# Patient Record
Sex: FEMALE | Race: WHITE | NOT HISPANIC OR LATINO
[De-identification: names, ages, dates, MRNs, and addresses within clinical notes are randomized per-mention and may not be internally consistent; named-entity substitution may affect disease eponyms.]

---

## 2017-01-25 ENCOUNTER — APPOINTMENT (OUTPATIENT)
Dept: HUMAN REPRODUCTION | Facility: CLINIC | Age: 32
End: 2017-01-25

## 2017-01-26 ENCOUNTER — APPOINTMENT (OUTPATIENT)
Dept: HUMAN REPRODUCTION | Facility: CLINIC | Age: 32
End: 2017-01-26

## 2017-02-02 ENCOUNTER — APPOINTMENT (OUTPATIENT)
Dept: HUMAN REPRODUCTION | Facility: CLINIC | Age: 32
End: 2017-02-02

## 2017-02-06 ENCOUNTER — APPOINTMENT (OUTPATIENT)
Dept: HUMAN REPRODUCTION | Facility: CLINIC | Age: 32
End: 2017-02-06

## 2017-02-07 ENCOUNTER — OTHER (OUTPATIENT)
Age: 32
End: 2017-02-07

## 2017-02-13 ENCOUNTER — APPOINTMENT (OUTPATIENT)
Dept: HUMAN REPRODUCTION | Facility: CLINIC | Age: 32
End: 2017-02-13

## 2017-02-15 ENCOUNTER — APPOINTMENT (OUTPATIENT)
Dept: HUMAN REPRODUCTION | Facility: CLINIC | Age: 32
End: 2017-02-15

## 2017-02-17 ENCOUNTER — OTHER (OUTPATIENT)
Age: 32
End: 2017-02-17

## 2017-02-17 ENCOUNTER — APPOINTMENT (OUTPATIENT)
Dept: HUMAN REPRODUCTION | Facility: CLINIC | Age: 32
End: 2017-02-17

## 2017-02-18 ENCOUNTER — APPOINTMENT (OUTPATIENT)
Dept: HUMAN REPRODUCTION | Facility: CLINIC | Age: 32
End: 2017-02-18

## 2017-02-19 ENCOUNTER — APPOINTMENT (OUTPATIENT)
Dept: HUMAN REPRODUCTION | Facility: CLINIC | Age: 32
End: 2017-02-19

## 2017-02-20 ENCOUNTER — APPOINTMENT (OUTPATIENT)
Dept: HUMAN REPRODUCTION | Facility: CLINIC | Age: 32
End: 2017-02-20

## 2017-02-23 ENCOUNTER — APPOINTMENT (OUTPATIENT)
Dept: HUMAN REPRODUCTION | Facility: CLINIC | Age: 32
End: 2017-02-23

## 2017-02-25 ENCOUNTER — APPOINTMENT (OUTPATIENT)
Dept: HUMAN REPRODUCTION | Facility: CLINIC | Age: 32
End: 2017-02-25

## 2017-03-03 ENCOUNTER — APPOINTMENT (OUTPATIENT)
Dept: HUMAN REPRODUCTION | Facility: CLINIC | Age: 32
End: 2017-03-03

## 2017-03-06 ENCOUNTER — APPOINTMENT (OUTPATIENT)
Dept: HUMAN REPRODUCTION | Facility: CLINIC | Age: 32
End: 2017-03-06

## 2017-03-08 ENCOUNTER — APPOINTMENT (OUTPATIENT)
Dept: HUMAN REPRODUCTION | Facility: CLINIC | Age: 32
End: 2017-03-08

## 2017-03-08 PROBLEM — N97.9 FEMALE INFERTILITY: Status: ACTIVE | Noted: 2017-02-02

## 2017-03-10 ENCOUNTER — APPOINTMENT (OUTPATIENT)
Dept: HUMAN REPRODUCTION | Facility: CLINIC | Age: 32
End: 2017-03-10

## 2017-03-10 DIAGNOSIS — N97.9 FEMALE INFERTILITY, UNSPECIFIED: ICD-10-CM

## 2017-03-20 ENCOUNTER — APPOINTMENT (OUTPATIENT)
Dept: HUMAN REPRODUCTION | Facility: CLINIC | Age: 32
End: 2017-03-20

## 2017-03-27 ENCOUNTER — APPOINTMENT (OUTPATIENT)
Dept: HUMAN REPRODUCTION | Facility: CLINIC | Age: 32
End: 2017-03-27

## 2017-04-01 ENCOUNTER — APPOINTMENT (OUTPATIENT)
Dept: HUMAN REPRODUCTION | Facility: CLINIC | Age: 32
End: 2017-04-01

## 2017-06-22 ENCOUNTER — RX RENEWAL (OUTPATIENT)
Age: 32
End: 2017-06-22

## 2017-06-28 ENCOUNTER — EMERGENCY (EMERGENCY)
Facility: HOSPITAL | Age: 32
LOS: 1 days | Discharge: HOME | End: 2017-06-28
Admitting: EMERGENCY MEDICINE
Payer: COMMERCIAL

## 2017-06-28 ENCOUNTER — OUTPATIENT (OUTPATIENT)
Dept: OUTPATIENT SERVICES | Facility: HOSPITAL | Age: 32
LOS: 1 days | End: 2017-06-28
Payer: COMMERCIAL

## 2017-06-28 VITALS
OXYGEN SATURATION: 100 % | DIASTOLIC BLOOD PRESSURE: 84 MMHG | HEART RATE: 90 BPM | RESPIRATION RATE: 18 BRPM | SYSTOLIC BLOOD PRESSURE: 136 MMHG

## 2017-06-28 VITALS
RESPIRATION RATE: 17 BRPM | SYSTOLIC BLOOD PRESSURE: 101 MMHG | TEMPERATURE: 98 F | HEART RATE: 89 BPM | DIASTOLIC BLOOD PRESSURE: 73 MMHG | OXYGEN SATURATION: 99 %

## 2017-06-28 DIAGNOSIS — O26.899 OTHER SPECIFIED PREGNANCY RELATED CONDITIONS, UNSPECIFIED TRIMESTER: ICD-10-CM

## 2017-06-28 DIAGNOSIS — Z98.89 OTHER SPECIFIED POSTPROCEDURAL STATES: Chronic | ICD-10-CM

## 2017-06-28 DIAGNOSIS — Z3A.00 WEEKS OF GESTATION OF PREGNANCY NOT SPECIFIED: ICD-10-CM

## 2017-06-28 LAB
ANION GAP SERPL CALC-SCNC: 13 MMOL/L — SIGNIFICANT CHANGE UP (ref 5–17)
APPEARANCE UR: ABNORMAL
BILIRUB UR-MCNC: NEGATIVE — SIGNIFICANT CHANGE UP
BLD GP AB SCN SERPL QL: NEGATIVE — SIGNIFICANT CHANGE UP
BUN SERPL-MCNC: 7 MG/DL — SIGNIFICANT CHANGE UP (ref 7–23)
CALCIUM SERPL-MCNC: 9.3 MG/DL — SIGNIFICANT CHANGE UP (ref 8.4–10.5)
CHLORIDE SERPL-SCNC: 104 MMOL/L — SIGNIFICANT CHANGE UP (ref 96–108)
CO2 SERPL-SCNC: 21 MMOL/L — LOW (ref 22–31)
COLOR SPEC: YELLOW — SIGNIFICANT CHANGE UP
CREAT SERPL-MCNC: 0.66 MG/DL — SIGNIFICANT CHANGE UP (ref 0.5–1.3)
DIFF PNL FLD: ABNORMAL
EPI CELLS # UR: SIGNIFICANT CHANGE UP /HPF
GLUCOSE SERPL-MCNC: 111 MG/DL — HIGH (ref 70–99)
GLUCOSE UR QL: NEGATIVE — SIGNIFICANT CHANGE UP
HCT VFR BLD CALC: 35.7 % — SIGNIFICANT CHANGE UP (ref 34.5–45)
HGB BLD-MCNC: 12.1 G/DL — SIGNIFICANT CHANGE UP (ref 11.5–15.5)
KETONES UR-MCNC: NEGATIVE — SIGNIFICANT CHANGE UP
LEUKOCYTE ESTERASE UR-ACNC: ABNORMAL
MCHC RBC-ENTMCNC: 30.4 PG — SIGNIFICANT CHANGE UP (ref 27–34)
MCHC RBC-ENTMCNC: 33.8 GM/DL — SIGNIFICANT CHANGE UP (ref 32–36)
MCV RBC AUTO: 89.9 FL — SIGNIFICANT CHANGE UP (ref 80–100)
NITRITE UR-MCNC: NEGATIVE — SIGNIFICANT CHANGE UP
PH UR: 6 — SIGNIFICANT CHANGE UP (ref 5–8)
PLATELET # BLD AUTO: 194 K/UL — SIGNIFICANT CHANGE UP (ref 150–400)
POTASSIUM SERPL-MCNC: 4.7 MMOL/L — SIGNIFICANT CHANGE UP (ref 3.5–5.3)
POTASSIUM SERPL-SCNC: 4.7 MMOL/L — SIGNIFICANT CHANGE UP (ref 3.5–5.3)
PROT UR-MCNC: SIGNIFICANT CHANGE UP
RBC # BLD: 3.97 M/UL — SIGNIFICANT CHANGE UP (ref 3.8–5.2)
RBC # FLD: 13 % — SIGNIFICANT CHANGE UP (ref 10.3–14.5)
RBC CASTS # UR COMP ASSIST: SIGNIFICANT CHANGE UP /HPF (ref 0–2)
RH IG SCN BLD-IMP: POSITIVE — SIGNIFICANT CHANGE UP
SODIUM SERPL-SCNC: 138 MMOL/L — SIGNIFICANT CHANGE UP (ref 135–145)
SP GR SPEC: 1.01 — SIGNIFICANT CHANGE UP (ref 1.01–1.02)
UROBILINOGEN FLD QL: NEGATIVE — SIGNIFICANT CHANGE UP
WBC # BLD: 9.1 K/UL — SIGNIFICANT CHANGE UP (ref 3.8–10.5)
WBC # FLD AUTO: 9.1 K/UL — SIGNIFICANT CHANGE UP (ref 3.8–10.5)
WBC UR QL: SIGNIFICANT CHANGE UP /HPF (ref 0–5)

## 2017-06-28 PROCEDURE — 59025 FETAL NON-STRESS TEST: CPT

## 2017-06-28 PROCEDURE — G0463: CPT

## 2017-06-28 PROCEDURE — 80048 BASIC METABOLIC PNL TOTAL CA: CPT

## 2017-06-28 PROCEDURE — 81001 URINALYSIS AUTO W/SCOPE: CPT

## 2017-06-28 PROCEDURE — 86900 BLOOD TYPING SEROLOGIC ABO: CPT

## 2017-06-28 PROCEDURE — 86850 RBC ANTIBODY SCREEN: CPT

## 2017-06-28 PROCEDURE — 86901 BLOOD TYPING SEROLOGIC RH(D): CPT

## 2017-06-28 PROCEDURE — 99053 MED SERV 10PM-8AM 24 HR FAC: CPT

## 2017-06-28 PROCEDURE — 85027 COMPLETE CBC AUTOMATED: CPT

## 2017-06-28 PROCEDURE — 99283 EMERGENCY DEPT VISIT LOW MDM: CPT

## 2017-06-28 PROCEDURE — 99284 EMERGENCY DEPT VISIT MOD MDM: CPT | Mod: 25

## 2017-06-28 RX ORDER — METHENAMINE MANDELATE 1 G
1 TABLET ORAL
Qty: 14 | Refills: 0
Start: 2017-06-28 | End: 2017-07-05

## 2017-06-28 NOTE — ED PROVIDER NOTE - PROGRESS NOTE DETAILS
Case d/w L&D, who agree with d/c to L&D, OB/GYN will f/u Rh status. Bedside FHR:  160.  Case d/w L&D, who agree with d/c to L&D, OB/GYN will f/u Rh status. RH+.  UA = large LE, trace blood, moderate epithelial cells.  Denied dysuria/hematuria.  Results communicated to L&D.
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
## 2017-07-10 ENCOUNTER — RX RENEWAL (OUTPATIENT)
Age: 32
End: 2017-07-10

## 2018-03-09 ENCOUNTER — APPOINTMENT (OUTPATIENT)
Dept: PAIN MANAGEMENT | Facility: CLINIC | Age: 33
End: 2018-03-09
Payer: COMMERCIAL

## 2018-03-09 VITALS
HEART RATE: 74 BPM | DIASTOLIC BLOOD PRESSURE: 72 MMHG | WEIGHT: 185 LBS | HEIGHT: 64 IN | SYSTOLIC BLOOD PRESSURE: 110 MMHG | BODY MASS INDEX: 31.58 KG/M2

## 2018-03-09 PROCEDURE — 99213 OFFICE O/P EST LOW 20 MIN: CPT

## 2018-03-09 RX ORDER — ASCORBIC ACID, CHOLECALCIFEROL, .ALPHA.-TOCOPHEROL ACETATE, DL-, PYRIDOXINE, FOLIC ACID, CYANOCOBALAMIN, CALCIUM, FERROUS FUMARATE, MAGNESIUM, DOCONEXENT 85; 200; 10; 25; 1; 12; 140; 27; 45; 300 [IU]/1; [IU]/1; [IU]/1; [IU]/1; MG/1; UG/1; MG/1; MG/1; MG/1; MG/1
27-0.6-0.4-3 CAPSULE, GELATIN COATED ORAL DAILY
Qty: 90 | Refills: 0 | Status: DISCONTINUED | COMMUNITY
Start: 2017-04-01 | End: 2018-03-09

## 2018-03-09 RX ORDER — NITROFURANTOIN (MONOHYDRATE/MACROCRYSTALS) 25; 75 MG/1; MG/1
100 CAPSULE ORAL TWICE DAILY
Qty: 20 | Refills: 0 | Status: DISCONTINUED | COMMUNITY
Start: 2017-04-01 | End: 2018-03-09

## 2018-03-09 RX ORDER — VITAMIN A ACETATE, .BETA.-CAROTENE, ASCORBIC ACID, CHOLECALCIFEROL, .ALPHA.-TOCOPHEROL ACETATE, DL-, THIAMINE MONONITRATE, RIBOFLAVIN, NIACINAMIDE, PYRIDOXINE HYDROCHLORIDE, FOLIC ACID, CYANOCOBALAMIN, CALCIUM CARBONATE, FERROUS FUMARATE, ZINC OXIDE, AND CUPRIC OXIDE 2000; 2000; 120; 400; 22; 1.84; 3; 20; 10; 1; 12; 200; 27; 25; 2 [IU]/1; [IU]/1; MG/1; [IU]/1; MG/1; MG/1; MG/1; MG/1; MG/1; MG/1; UG/1; MG/1; MG/1; MG/1; MG/1
27-1 TABLET ORAL DAILY
Qty: 30 | Refills: 11 | Status: DISCONTINUED | COMMUNITY
Start: 2017-02-02 | End: 2018-03-09

## 2018-03-09 RX ORDER — GONADOTROPHIN, CHORIONIC 10000 UNIT
10000 KIT INTRAMUSCULAR
Qty: 1 | Refills: 3 | Status: DISCONTINUED | COMMUNITY
Start: 2017-02-07 | End: 2018-03-09

## 2018-03-09 RX ORDER — FOLLITROPIN ALFA 450 UNIT
450 KIT SUBCUTANEOUS
Qty: 10 | Refills: 3 | Status: DISCONTINUED | COMMUNITY
Start: 2017-02-07 | End: 2018-03-09

## 2018-03-09 RX ORDER — NORETHINDRONE ACETATE AND ETHINYL ESTRADIOL AND FERROUS FUMARATE 1MG-20(21)
1-20 KIT ORAL
Qty: 28 | Refills: 0 | Status: ACTIVE | COMMUNITY
Start: 2017-11-30

## 2018-03-09 RX ORDER — LEVOTHYROXINE SODIUM 0.2 MG/1
200 TABLET ORAL
Qty: 30 | Refills: 0 | Status: ACTIVE | COMMUNITY
Start: 2017-08-16

## 2018-03-09 RX ORDER — SERTRALINE HYDROCHLORIDE 50 MG/1
50 TABLET, FILM COATED ORAL
Qty: 90 | Refills: 0 | Status: ACTIVE | COMMUNITY
Start: 2018-02-26

## 2018-03-09 RX ORDER — SYRINGE WITH NEEDLE, 1 ML 25GX5/8"
22G X 1-1/2" SYRINGE, EMPTY DISPOSABLE MISCELLANEOUS
Qty: 30 | Refills: 3 | Status: DISCONTINUED | COMMUNITY
Start: 2017-02-07 | End: 2018-03-09

## 2018-03-09 RX ORDER — LEUPROLIDE ACETATE 1 MG/0.2ML
1 KIT SUBCUTANEOUS
Qty: 1 | Refills: 0 | Status: DISCONTINUED | COMMUNITY
Start: 2017-02-17 | End: 2018-03-09

## 2018-03-09 RX ORDER — DOXYCYCLINE HYCLATE 100 MG/1
100 CAPSULE ORAL
Qty: 33 | Refills: 1 | Status: DISCONTINUED | COMMUNITY
Start: 2017-02-07 | End: 2018-03-09

## 2018-03-09 RX ORDER — GANIRELIX ACETATE 250 UG/.5ML
250 INJECTION, SOLUTION SUBCUTANEOUS
Qty: 10 | Refills: 3 | Status: DISCONTINUED | COMMUNITY
Start: 2017-02-07 | End: 2018-03-09

## 2018-03-09 RX ORDER — PROGESTERONE 50 MG/ML
50 INJECTION, SOLUTION INTRAMUSCULAR
Qty: 6 | Refills: 3 | Status: DISCONTINUED | COMMUNITY
Start: 2017-02-07 | End: 2018-03-09

## 2018-03-09 RX ORDER — METHYLPREDNISOLONE 8 MG/1
8 TABLET ORAL
Qty: 12 | Refills: 2 | Status: DISCONTINUED | COMMUNITY
Start: 2017-02-07 | End: 2018-03-09

## 2018-03-09 RX ORDER — NORETHINDRONE AND ETHINYL ESTRADIOL 1 MG-35MCG
1-35 KIT ORAL DAILY
Qty: 1 | Refills: 1 | Status: DISCONTINUED | COMMUNITY
Start: 2017-01-25 | End: 2018-03-09

## 2018-03-09 RX ORDER — CHLORHEXIDINE GLUCONATE 4 %
325 (65 FE) LIQUID (ML) TOPICAL
Qty: 60 | Refills: 0 | Status: ACTIVE | COMMUNITY
Start: 2017-10-29

## 2018-03-09 RX ORDER — NEEDLES, DISPOSABLE 25GX5/8"
27G X 1/2" NEEDLE, DISPOSABLE MISCELLANEOUS
Qty: 30 | Refills: 3 | Status: DISCONTINUED | COMMUNITY
Start: 2017-02-07 | End: 2018-03-09

## 2018-03-09 RX ORDER — NEEDLES, DISPOSABLE 25GX5/8"
18G X 1-1/2" NEEDLE, DISPOSABLE MISCELLANEOUS
Qty: 30 | Refills: 4 | Status: DISCONTINUED | COMMUNITY
Start: 2017-02-07 | End: 2018-03-09

## 2018-03-09 RX ORDER — MENOTROPINS 75 UNIT
75 KIT SUBCUTANEOUS
Qty: 60 | Refills: 3 | Status: DISCONTINUED | COMMUNITY
Start: 2017-02-07 | End: 2018-03-09

## 2018-03-29 ENCOUNTER — APPOINTMENT (OUTPATIENT)
Dept: PAIN MANAGEMENT | Facility: CLINIC | Age: 33
End: 2018-03-29
Payer: COMMERCIAL

## 2018-03-29 VITALS
HEART RATE: 76 BPM | BODY MASS INDEX: 30.73 KG/M2 | DIASTOLIC BLOOD PRESSURE: 73 MMHG | WEIGHT: 180 LBS | SYSTOLIC BLOOD PRESSURE: 108 MMHG | HEIGHT: 64 IN

## 2018-03-29 PROCEDURE — 99213 OFFICE O/P EST LOW 20 MIN: CPT | Mod: 25

## 2018-03-29 PROCEDURE — 64615 CHEMODENERV MUSC MIGRAINE: CPT

## 2018-06-27 ENCOUNTER — APPOINTMENT (OUTPATIENT)
Dept: PAIN MANAGEMENT | Facility: CLINIC | Age: 33
End: 2018-06-27
Payer: COMMERCIAL

## 2018-06-27 VITALS
BODY MASS INDEX: 29.02 KG/M2 | DIASTOLIC BLOOD PRESSURE: 71 MMHG | HEIGHT: 64 IN | WEIGHT: 170 LBS | HEART RATE: 79 BPM | SYSTOLIC BLOOD PRESSURE: 108 MMHG

## 2018-06-27 PROCEDURE — 64615 CHEMODENERV MUSC MIGRAINE: CPT

## 2018-06-27 PROCEDURE — 99213 OFFICE O/P EST LOW 20 MIN: CPT | Mod: 25

## 2018-06-27 RX ORDER — PHENTERMINE AND TOPIRAMATE 11.25; 69 MG/1; MG/1
11.25-69 CAPSULE, EXTENDED RELEASE ORAL
Refills: 0 | Status: ACTIVE | COMMUNITY

## 2018-06-27 RX ORDER — FLUOXETINE HYDROCHLORIDE 10 MG/1
10 CAPSULE ORAL
Refills: 0 | Status: ACTIVE | COMMUNITY

## 2018-10-03 ENCOUNTER — APPOINTMENT (OUTPATIENT)
Dept: PAIN MANAGEMENT | Facility: CLINIC | Age: 33
End: 2018-10-03
Payer: COMMERCIAL

## 2018-10-09 ENCOUNTER — APPOINTMENT (OUTPATIENT)
Dept: PAIN MANAGEMENT | Facility: CLINIC | Age: 33
End: 2018-10-09
Payer: COMMERCIAL

## 2018-10-09 VITALS
DIASTOLIC BLOOD PRESSURE: 75 MMHG | WEIGHT: 170 LBS | SYSTOLIC BLOOD PRESSURE: 106 MMHG | HEART RATE: 79 BPM | HEIGHT: 64 IN | BODY MASS INDEX: 29.02 KG/M2

## 2018-10-09 PROCEDURE — 99213 OFFICE O/P EST LOW 20 MIN: CPT | Mod: 25

## 2018-10-09 PROCEDURE — 64615 CHEMODENERV MUSC MIGRAINE: CPT

## 2018-10-10 PROBLEM — E03.9 HYPOTHYROIDISM, UNSPECIFIED: Chronic | Status: ACTIVE | Noted: 2017-06-28

## 2019-01-19 ENCOUNTER — TRANSCRIPTION ENCOUNTER (OUTPATIENT)
Age: 34
End: 2019-01-19

## 2019-01-22 ENCOUNTER — APPOINTMENT (OUTPATIENT)
Dept: PAIN MANAGEMENT | Facility: CLINIC | Age: 34
End: 2019-01-22
Payer: COMMERCIAL

## 2019-01-22 VITALS
HEART RATE: 84 BPM | WEIGHT: 170 LBS | HEIGHT: 64 IN | BODY MASS INDEX: 29.02 KG/M2 | DIASTOLIC BLOOD PRESSURE: 76 MMHG | SYSTOLIC BLOOD PRESSURE: 115 MMHG

## 2019-01-22 PROCEDURE — 64615 CHEMODENERV MUSC MIGRAINE: CPT

## 2019-01-22 PROCEDURE — 99213 OFFICE O/P EST LOW 20 MIN: CPT | Mod: 25

## 2019-01-22 NOTE — PHYSICAL EXAM
[General Appearance - Alert] : alert [General Appearance - Well Nourished] : well nourished [General Appearance - Well Developed] : well developed [Oriented To Time, Place, And Person] : oriented to person, place, and time [Affect] : the affect was normal [Mood] : the mood was normal [Person] : oriented to person [Place] : oriented to place [Time] : oriented to time [Short Term Intact] : short term memory intact [Remote Intact] : remote memory intact [Registration Intact] : recent registration memory intact [Span Intact] : the attention span was normal [Concentration Intact] : normal concentrating ability [Visual Intact] : visual attention was ~T not ~L decreased [Fluency] : fluency intact [Comprehension] : comprehension intact [Cranial Nerves Oculomotor (III)] : extraocular motion intact [Cranial Nerves Trigeminal (V)] : facial sensation intact symmetrically [Cranial Nerves Facial (VII)] : face symmetrical [Cranial Nerves Vestibulocochlear (VIII)] : hearing was intact bilaterally [Cranial Nerves Accessory (XI - Cranial And Spinal)] : head turning and shoulder shrug symmetric [Motor Tone] : muscle tone was normal in all four extremities [Motor Strength] : muscle strength was normal in all four extremities [Involuntary Movements] : no involuntary movements were seen [Balance] : balance was intact [Sclera] : the sclera and conjunctiva were normal [Extraocular Movements] : extraocular movements were intact [Outer Ear] : the ears and nose were normal in appearance [Hearing Threshold Finger Rub Not [place]] : hearing was normal [Neck Appearance] : the appearance of the neck was normal [Abnormal Walk] : normal gait [Musculoskeletal - Swelling] : no joint swelling seen [Skin Color & Pigmentation] : normal skin color and pigmentation [] : no rash

## 2019-01-25 ENCOUNTER — MEDICATION RENEWAL (OUTPATIENT)
Age: 34
End: 2019-01-25

## 2019-02-17 NOTE — ASSESSMENT
[FreeTextEntry1] : 34 y/o female patient with chronic migraines presents today for a follow up. Following her examination today it has been concluded that the BOTOX has provided a significant reduction in migraines a month. No AE to the BOTOX have been reported at this time. \par \par As a result: \par 1) She will undergo an additional BOTOX treatment today. The procedure was tolerated well and she reports no immediate AE. \par 2) It is advised that certain components of the weight loss drug could cause some of the minor HAs and that the BOTOX does not always provide relief from the minor HAs. \par 3) She was educated on the risks and benefits of preventative medications such as Emgality and Aimovig. Approval is needed before she can begin the medication. \par 4) Upon approval she will follow up to be educated on the usage of the medication. She will follow up for additional BOTOX treatment in 3 months or sooner if clinically indicated.

## 2019-02-17 NOTE — HISTORY OF PRESENT ILLNESS
[FreeTextEntry1] : 34 y/o female patient presents today for a follow up. She reports that since the last BOTOX treatment only one migraine a week occurs. Before the BOTOX there would be 3 migraines a week. She notes that mild HAs do occur 3-4 days a week. Additionally she has been taking a weight loss drug.

## 2019-02-17 NOTE — END OF VISIT
[>50% of Time Spent on Counseling for ____] : Greater than 50% of the encounter time was spent on counseling for [unfilled] [Time Spent: ___ minutes] : I have spent [unfilled] minutes of face to face time with the patient [FreeTextEntry3] : This note was authored by Dominic Arroyo working as a medical scribe for Dr. Ryan Enriquez. The note was reviewed, edited, and revised by Dr. Ryan Enriquez who is in agreement with the exam findings, and treatment plan. (1/22/19)

## 2019-02-17 NOTE — PROCEDURE
[Chronic migraine] : Chronic migraine [Consent Signed] : consent signed [Continue Current Treatment] : continue current treatment [BOTOX 200 Units] : BOTOX 200 units; 5 units per muscle site.  procerus x 1, corragator x 2, frontalis x 4, temporalis x 8, occipitalis x 6, cervical  paraspinal x 4 and trapezius x 6 [Adverse Effects] : no adverse effects [FreeTextEntry1] : 45 units wasted.

## 2019-04-23 ENCOUNTER — APPOINTMENT (OUTPATIENT)
Dept: PAIN MANAGEMENT | Facility: CLINIC | Age: 34
End: 2019-04-23
Payer: COMMERCIAL

## 2019-04-23 VITALS
WEIGHT: 160 LBS | BODY MASS INDEX: 27.31 KG/M2 | HEART RATE: 75 BPM | SYSTOLIC BLOOD PRESSURE: 108 MMHG | DIASTOLIC BLOOD PRESSURE: 72 MMHG | HEIGHT: 64 IN

## 2019-04-23 PROCEDURE — 99213 OFFICE O/P EST LOW 20 MIN: CPT | Mod: 25

## 2019-04-23 PROCEDURE — 64615 CHEMODENERV MUSC MIGRAINE: CPT

## 2019-04-23 NOTE — END OF VISIT
[>50% of Time Spent on Counseling for ____] : Greater than 50% of the encounter time was spent on counseling for [unfilled] [Time Spent: ___ minutes] : I have spent [unfilled] minutes of face to face time with the patient [FreeTextEntry3] : This note was authored by Dominic Arroyo working as a medical scribe for Dr. Ryan Enriquez. The note was reviewed, edited, and revised by Dr. Ryan Enriquez who is in agreement with the exam findings, and treatment plan. (4/23/19)

## 2019-04-23 NOTE — PHYSICAL EXAM
[General Appearance - Alert] : alert [General Appearance - Well Nourished] : well nourished [General Appearance - Well Developed] : well developed [Oriented To Time, Place, And Person] : oriented to person, place, and time [Mood] : the mood was normal [Affect] : the affect was normal [Person] : oriented to person [Time] : oriented to time [Place] : oriented to place [Short Term Intact] : short term memory intact [Remote Intact] : remote memory intact [Registration Intact] : recent registration memory intact [Span Intact] : the attention span was normal [Concentration Intact] : normal concentrating ability [Visual Intact] : visual attention was ~T not ~L decreased [Fluency] : fluency intact [Comprehension] : comprehension intact [Cranial Nerves Oculomotor (III)] : extraocular motion intact [Cranial Nerves Facial (VII)] : face symmetrical [Cranial Nerves Trigeminal (V)] : facial sensation intact symmetrically [Cranial Nerves Accessory (XI - Cranial And Spinal)] : head turning and shoulder shrug symmetric [Cranial Nerves Vestibulocochlear (VIII)] : hearing was intact bilaterally [Involuntary Movements] : no involuntary movements were seen [Motor Strength] : muscle strength was normal in all four extremities [Motor Tone] : muscle tone was normal in all four extremities [Balance] : balance was intact [Sclera] : the sclera and conjunctiva were normal [Extraocular Movements] : extraocular movements were intact [Outer Ear] : the ears and nose were normal in appearance [Neck Appearance] : the appearance of the neck was normal [Hearing Threshold Finger Rub Not [place]] : hearing was normal [Abnormal Walk] : normal gait [Musculoskeletal - Swelling] : no joint swelling seen [Skin Color & Pigmentation] : normal skin color and pigmentation [] : no rash

## 2019-04-23 NOTE — DATA REVIEWED
[FreeTextEntry1] : Pt's I-STOP was reviewed and discussed during today's visit. Ref # [id number]\par

## 2019-04-23 NOTE — ASSESSMENT
[FreeTextEntry1] : 32 y/o female patient with chronic migraines presents today for a follow up. Following her examination today it is determined that BOTOX continues to provide a significant reduction in the frequency of her HAs. She will be provided with a card for Emgality that will allow her to receive the medication for free for a year. \par \par During her visit today: \par 1) An additional BOTOX treatment was completed. The procedure was tolerated well and no immediate AE were reported. \par 2) Emgality is to be initiated. \par 3) She will follow up in 3 months or sooner if clinically indicated.

## 2019-04-23 NOTE — PROCEDURE
[Consent Signed] : consent signed [Chronic migraine] : Chronic migraine [Continue Current Treatment] : continue current treatment [BOTOX 200 Units] : BOTOX 200 units; 5 units per muscle site.  procerus x 1, corragator x 2, frontalis x 4, temporalis x 8, occipitalis x 6, cervical  paraspinal x 4 and trapezius x 6 [Adverse Effects] : no adverse effects [FreeTextEntry1] : 45 units wasted.

## 2019-04-23 NOTE — HISTORY OF PRESENT ILLNESS
[FreeTextEntry1] : 34 y/o female patient presents today for a follow up. Today she continues to report that the BOTOX provides a significant reduction in the frequency of her migraines. Before starting BOTOX she would experience 2-3 migraines a week. Currently, only 1-2 migraines occur a month. No AE to the BOTOX are reported at this time. Emgality was to be initiated but it was not approved by her pharmacy.

## 2019-04-26 ENCOUNTER — APPOINTMENT (OUTPATIENT)
Dept: PAIN MANAGEMENT | Facility: CLINIC | Age: 34
End: 2019-04-26
Payer: COMMERCIAL

## 2019-04-26 VITALS
SYSTOLIC BLOOD PRESSURE: 109 MMHG | HEIGHT: 64 IN | HEART RATE: 85 BPM | BODY MASS INDEX: 27.31 KG/M2 | DIASTOLIC BLOOD PRESSURE: 57 MMHG | WEIGHT: 160 LBS

## 2019-04-26 DIAGNOSIS — G43.909 MIGRAINE, UNSPECIFIED, NOT INTRACTABLE, W/OUT STATUS MIGRAINOSUS: ICD-10-CM

## 2019-04-26 DIAGNOSIS — R51 HEADACHE: ICD-10-CM

## 2019-04-26 PROCEDURE — 96372 THER/PROPH/DIAG INJ SC/IM: CPT

## 2019-04-26 PROCEDURE — 99212 OFFICE O/P EST SF 10 MIN: CPT | Mod: 25

## 2019-04-26 RX ORDER — GALCANEZUMAB 120 MG/ML
120 INJECTION, SOLUTION SUBCUTANEOUS
Qty: 1 | Refills: 5 | Status: ACTIVE | COMMUNITY
Start: 2019-01-22 | End: 1900-01-01

## 2019-04-26 NOTE — PHYSICAL EXAM
[General Appearance - Alert] : alert [General Appearance - Well Nourished] : well nourished [Oriented To Time, Place, And Person] : oriented to person, place, and time [Affect] : the affect was normal [General Appearance - Well Developed] : well developed [Mood] : the mood was normal [Person] : oriented to person [Short Term Intact] : short term memory intact [Time] : oriented to time [Place] : oriented to place [Registration Intact] : recent registration memory intact [Remote Intact] : remote memory intact [Span Intact] : the attention span was normal [Visual Intact] : visual attention was ~T not ~L decreased [Concentration Intact] : normal concentrating ability [Cranial Nerves Oculomotor (III)] : extraocular motion intact [Comprehension] : comprehension intact [Fluency] : fluency intact [Cranial Nerves Trigeminal (V)] : facial sensation intact symmetrically [Cranial Nerves Facial (VII)] : face symmetrical [Motor Tone] : muscle tone was normal in all four extremities [Cranial Nerves Accessory (XI - Cranial And Spinal)] : head turning and shoulder shrug symmetric [Cranial Nerves Vestibulocochlear (VIII)] : hearing was intact bilaterally [Motor Strength] : muscle strength was normal in all four extremities [Involuntary Movements] : no involuntary movements were seen [Balance] : balance was intact [Sclera] : the sclera and conjunctiva were normal [Hearing Threshold Finger Rub Not [place]] : hearing was normal [Outer Ear] : the ears and nose were normal in appearance [Extraocular Movements] : extraocular movements were intact [Neck Appearance] : the appearance of the neck was normal [Musculoskeletal - Swelling] : no joint swelling seen [Abnormal Walk] : normal gait [] : no rash [Skin Color & Pigmentation] : normal skin color and pigmentation

## 2019-04-29 NOTE — DATA REVIEWED
[FreeTextEntry1] : Pt's I-STOP was reviewed and discussed during today's visit. Ref # [id number]\par

## 2019-04-29 NOTE — ASSESSMENT
[FreeTextEntry1] : Emgality injections 120 mgs x's 2 administered.  See above procedure note.  Istop (#[id number] \par \par Patient education provided regarding proper self administration, storage, disposal, and side effects.\par \par She will RTO in 3 months time for re-evaluation or sooner if clinically indicated.

## 2019-04-29 NOTE — HISTORY OF PRESENT ILLNESS
[FreeTextEntry1] : The patient returned today for a follow up ov.  She is here today to have her first emgality injections and patient teaching for self administration at home.  Her headaches have remained stable in both intensity and frequency.  They continue to significantly impact her daily life.

## 2019-07-12 ENCOUNTER — TRANSCRIPTION ENCOUNTER (OUTPATIENT)
Age: 34
End: 2019-07-12

## 2019-08-27 ENCOUNTER — APPOINTMENT (OUTPATIENT)
Dept: PAIN MANAGEMENT | Facility: CLINIC | Age: 34
End: 2019-08-27

## 2019-09-11 ENCOUNTER — TRANSCRIPTION ENCOUNTER (OUTPATIENT)
Age: 34
End: 2019-09-11

## 2019-11-12 ENCOUNTER — TRANSCRIPTION ENCOUNTER (OUTPATIENT)
Age: 34
End: 2019-11-12

## 2020-02-27 ENCOUNTER — APPOINTMENT (OUTPATIENT)
Dept: HUMAN REPRODUCTION | Facility: CLINIC | Age: 35
End: 2020-02-27
Payer: COMMERCIAL

## 2020-02-27 PROCEDURE — 36415 COLL VENOUS BLD VENIPUNCTURE: CPT

## 2020-02-27 PROCEDURE — 99215 OFFICE O/P EST HI 40 MIN: CPT | Mod: 25

## 2020-03-03 ENCOUNTER — APPOINTMENT (OUTPATIENT)
Dept: HUMAN REPRODUCTION | Facility: CLINIC | Age: 35
End: 2020-03-03
Payer: COMMERCIAL

## 2020-03-03 PROCEDURE — 76830 TRANSVAGINAL US NON-OB: CPT

## 2020-03-03 PROCEDURE — 36415 COLL VENOUS BLD VENIPUNCTURE: CPT

## 2020-03-03 PROCEDURE — 99213 OFFICE O/P EST LOW 20 MIN: CPT | Mod: 25

## 2020-03-13 ENCOUNTER — APPOINTMENT (OUTPATIENT)
Dept: HUMAN REPRODUCTION | Facility: CLINIC | Age: 35
End: 2020-03-13
Payer: COMMERCIAL

## 2020-03-13 PROCEDURE — 76830 TRANSVAGINAL US NON-OB: CPT

## 2020-03-13 PROCEDURE — 99213 OFFICE O/P EST LOW 20 MIN: CPT | Mod: 25

## 2020-03-13 PROCEDURE — 36415 COLL VENOUS BLD VENIPUNCTURE: CPT

## 2020-03-17 ENCOUNTER — APPOINTMENT (OUTPATIENT)
Dept: HUMAN REPRODUCTION | Facility: CLINIC | Age: 35
End: 2020-03-17

## 2020-04-09 ENCOUNTER — APPOINTMENT (OUTPATIENT)
Dept: HUMAN REPRODUCTION | Facility: CLINIC | Age: 35
End: 2020-04-09
Payer: COMMERCIAL

## 2020-04-09 ENCOUNTER — APPOINTMENT (OUTPATIENT)
Dept: HUMAN REPRODUCTION | Facility: CLINIC | Age: 35
End: 2020-04-09

## 2020-04-09 PROCEDURE — 99212 OFFICE O/P EST SF 10 MIN: CPT | Mod: 95

## 2020-06-18 ENCOUNTER — APPOINTMENT (OUTPATIENT)
Dept: HUMAN REPRODUCTION | Facility: CLINIC | Age: 35
End: 2020-06-18

## 2020-06-25 ENCOUNTER — APPOINTMENT (OUTPATIENT)
Dept: HUMAN REPRODUCTION | Facility: CLINIC | Age: 35
End: 2020-06-25
Payer: COMMERCIAL

## 2020-06-25 ENCOUNTER — RESULT REVIEW (OUTPATIENT)
Age: 35
End: 2020-06-25

## 2020-06-25 PROCEDURE — 76830 TRANSVAGINAL US NON-OB: CPT

## 2020-06-25 PROCEDURE — 36415 COLL VENOUS BLD VENIPUNCTURE: CPT

## 2020-06-25 PROCEDURE — 99213 OFFICE O/P EST LOW 20 MIN: CPT | Mod: 25

## 2020-06-28 DIAGNOSIS — Z01.818 ENCOUNTER FOR OTHER PREPROCEDURAL EXAMINATION: ICD-10-CM

## 2020-06-29 ENCOUNTER — TRANSCRIPTION ENCOUNTER (OUTPATIENT)
Age: 35
End: 2020-06-29

## 2020-06-29 ENCOUNTER — APPOINTMENT (OUTPATIENT)
Dept: DISASTER EMERGENCY | Facility: CLINIC | Age: 35
End: 2020-06-29

## 2020-06-29 LAB — SARS-COV-2 N GENE NPH QL NAA+PROBE: NOT DETECTED

## 2020-07-04 ENCOUNTER — APPOINTMENT (OUTPATIENT)
Dept: HUMAN REPRODUCTION | Facility: CLINIC | Age: 35
End: 2020-07-04
Payer: COMMERCIAL

## 2020-07-04 PROCEDURE — 36415 COLL VENOUS BLD VENIPUNCTURE: CPT

## 2020-07-04 PROCEDURE — 76830 TRANSVAGINAL US NON-OB: CPT

## 2020-07-04 PROCEDURE — 99213 OFFICE O/P EST LOW 20 MIN: CPT | Mod: 25

## 2020-07-07 ENCOUNTER — APPOINTMENT (OUTPATIENT)
Dept: HUMAN REPRODUCTION | Facility: CLINIC | Age: 35
End: 2020-07-07
Payer: COMMERCIAL

## 2020-07-07 PROCEDURE — 99213 OFFICE O/P EST LOW 20 MIN: CPT | Mod: 25

## 2020-07-07 PROCEDURE — 36415 COLL VENOUS BLD VENIPUNCTURE: CPT

## 2020-07-07 PROCEDURE — 76830 TRANSVAGINAL US NON-OB: CPT | Mod: 26

## 2020-07-10 ENCOUNTER — APPOINTMENT (OUTPATIENT)
Dept: HUMAN REPRODUCTION | Facility: CLINIC | Age: 35
End: 2020-07-10
Payer: COMMERCIAL

## 2020-07-10 PROCEDURE — 36415 COLL VENOUS BLD VENIPUNCTURE: CPT

## 2020-07-10 PROCEDURE — 58340 CATHETER FOR HYSTEROGRAPHY: CPT

## 2020-07-10 PROCEDURE — 99213 OFFICE O/P EST LOW 20 MIN: CPT | Mod: 25

## 2020-07-10 PROCEDURE — 76831 ECHO EXAM UTERUS: CPT

## 2020-07-10 PROCEDURE — 76830 TRANSVAGINAL US NON-OB: CPT | Mod: 26

## 2020-07-13 ENCOUNTER — APPOINTMENT (OUTPATIENT)
Dept: HUMAN REPRODUCTION | Facility: CLINIC | Age: 35
End: 2020-07-13
Payer: COMMERCIAL

## 2020-07-13 PROCEDURE — 76831 ECHO EXAM UTERUS: CPT

## 2020-07-13 PROCEDURE — 58340 CATHETER FOR HYSTEROGRAPHY: CPT

## 2020-07-13 PROCEDURE — 99213 OFFICE O/P EST LOW 20 MIN: CPT | Mod: 25

## 2020-07-14 ENCOUNTER — APPOINTMENT (OUTPATIENT)
Dept: HUMAN REPRODUCTION | Facility: CLINIC | Age: 35
End: 2020-07-14
Payer: COMMERCIAL

## 2020-07-14 ENCOUNTER — APPOINTMENT (OUTPATIENT)
Dept: DISASTER EMERGENCY | Facility: CLINIC | Age: 35
End: 2020-07-14

## 2020-07-14 PROCEDURE — 76830 TRANSVAGINAL US NON-OB: CPT | Mod: 26

## 2020-07-14 PROCEDURE — 99213 OFFICE O/P EST LOW 20 MIN: CPT | Mod: 25

## 2020-07-14 PROCEDURE — 36415 COLL VENOUS BLD VENIPUNCTURE: CPT

## 2020-07-15 ENCOUNTER — APPOINTMENT (OUTPATIENT)
Dept: HUMAN REPRODUCTION | Facility: CLINIC | Age: 35
End: 2020-07-15
Payer: COMMERCIAL

## 2020-07-15 ENCOUNTER — APPOINTMENT (OUTPATIENT)
Dept: DISASTER EMERGENCY | Facility: CLINIC | Age: 35
End: 2020-07-15

## 2020-07-15 LAB — SARS-COV-2 N GENE NPH QL NAA+PROBE: NOT DETECTED

## 2020-07-15 PROCEDURE — 76830 TRANSVAGINAL US NON-OB: CPT

## 2020-07-15 PROCEDURE — 36415 COLL VENOUS BLD VENIPUNCTURE: CPT

## 2020-07-15 PROCEDURE — 99213 OFFICE O/P EST LOW 20 MIN: CPT | Mod: 25

## 2020-07-16 ENCOUNTER — APPOINTMENT (OUTPATIENT)
Dept: HUMAN REPRODUCTION | Facility: CLINIC | Age: 35
End: 2020-07-16
Payer: COMMERCIAL

## 2020-07-16 PROCEDURE — 36415 COLL VENOUS BLD VENIPUNCTURE: CPT

## 2020-07-17 ENCOUNTER — APPOINTMENT (OUTPATIENT)
Dept: HUMAN REPRODUCTION | Facility: CLINIC | Age: 35
End: 2020-07-17
Payer: COMMERCIAL

## 2020-07-17 ENCOUNTER — FORM ENCOUNTER (OUTPATIENT)
Age: 35
End: 2020-07-17

## 2020-07-17 PROCEDURE — 89281 ASSIST OOCYTE FERTILIZATION: CPT

## 2020-07-17 PROCEDURE — 76948 ECHO GUIDE OVA ASPIRATION: CPT

## 2020-07-17 PROCEDURE — 89261 SPERM ISOLATION COMPLEX: CPT

## 2020-07-17 PROCEDURE — 58970 RETRIEVAL OF OOCYTE: CPT

## 2020-07-17 PROCEDURE — 89250 CULTR OOCYTE/EMBRYO <4 DAYS: CPT

## 2020-07-17 PROCEDURE — 89254 OOCYTE IDENTIFICATION: CPT

## 2020-07-18 ENCOUNTER — APPOINTMENT (OUTPATIENT)
Dept: OBGYN | Facility: CLINIC | Age: 35
End: 2020-07-18
Payer: COMMERCIAL

## 2020-07-18 PROCEDURE — 99243 OFF/OP CNSLTJ NEW/EST LOW 30: CPT

## 2020-07-19 ENCOUNTER — FORM ENCOUNTER (OUTPATIENT)
Age: 35
End: 2020-07-19

## 2020-07-22 PROCEDURE — 89258 CRYOPRESERVATION EMBRYO(S): CPT

## 2020-07-22 PROCEDURE — 89342 STORAGE/YEAR EMBRYO(S): CPT | Mod: NC

## 2020-07-22 PROCEDURE — 89272 EXTENDED CULTURE OF OOCYTES: CPT

## 2020-07-24 ENCOUNTER — RESULT REVIEW (OUTPATIENT)
Age: 35
End: 2020-07-24

## 2020-07-24 ENCOUNTER — APPOINTMENT (OUTPATIENT)
Dept: MRI IMAGING | Facility: HOSPITAL | Age: 35
End: 2020-07-24
Payer: COMMERCIAL

## 2020-07-24 ENCOUNTER — OUTPATIENT (OUTPATIENT)
Dept: OUTPATIENT SERVICES | Facility: HOSPITAL | Age: 35
LOS: 1 days | End: 2020-07-24
Payer: COMMERCIAL

## 2020-07-24 DIAGNOSIS — Z00.8 ENCOUNTER FOR OTHER GENERAL EXAMINATION: ICD-10-CM

## 2020-07-24 DIAGNOSIS — Z98.89 OTHER SPECIFIED POSTPROCEDURAL STATES: Chronic | ICD-10-CM

## 2020-07-24 PROCEDURE — A9579: CPT

## 2020-07-24 PROCEDURE — 72197 MRI PELVIS W/O & W/DYE: CPT | Mod: 26

## 2020-07-24 PROCEDURE — 72197 MRI PELVIS W/O & W/DYE: CPT

## 2020-07-29 ENCOUNTER — TRANSCRIPTION ENCOUNTER (OUTPATIENT)
Age: 35
End: 2020-07-29

## 2020-07-29 ENCOUNTER — FORM ENCOUNTER (OUTPATIENT)
Age: 35
End: 2020-07-29

## 2020-07-29 RX ORDER — RIZATRIPTAN BENZOATE 10 MG/1
10 TABLET ORAL
Qty: 18 | Refills: 3 | Status: ACTIVE | COMMUNITY
Start: 2018-03-09 | End: 1900-01-01

## 2020-08-27 ENCOUNTER — OUTPATIENT (OUTPATIENT)
Dept: OUTPATIENT SERVICES | Facility: HOSPITAL | Age: 35
LOS: 1 days | End: 2020-08-27
Payer: COMMERCIAL

## 2020-08-27 VITALS
HEIGHT: 64 IN | DIASTOLIC BLOOD PRESSURE: 73 MMHG | HEART RATE: 89 BPM | OXYGEN SATURATION: 99 % | SYSTOLIC BLOOD PRESSURE: 115 MMHG | WEIGHT: 187.39 LBS | TEMPERATURE: 99 F

## 2020-08-27 DIAGNOSIS — Z52.811 EGG (OOCYTE) DONOR UNDER AGE 35, DESIGNATED RECIPIENT: Chronic | ICD-10-CM

## 2020-08-27 DIAGNOSIS — N85.6 INTRAUTERINE SYNECHIAE: ICD-10-CM

## 2020-08-27 DIAGNOSIS — Z98.891 HISTORY OF UTERINE SCAR FROM PREVIOUS SURGERY: Chronic | ICD-10-CM

## 2020-08-27 DIAGNOSIS — Z98.890 OTHER SPECIFIED POSTPROCEDURAL STATES: Chronic | ICD-10-CM

## 2020-08-27 DIAGNOSIS — Z98.89 OTHER SPECIFIED POSTPROCEDURAL STATES: Chronic | ICD-10-CM

## 2020-08-27 DIAGNOSIS — Z01.818 ENCOUNTER FOR OTHER PREPROCEDURAL EXAMINATION: ICD-10-CM

## 2020-08-27 DIAGNOSIS — Z90.89 ACQUIRED ABSENCE OF OTHER ORGANS: Chronic | ICD-10-CM

## 2020-08-27 LAB
HCT VFR BLD CALC: 40.8 % — SIGNIFICANT CHANGE UP (ref 34.5–45)
HGB BLD-MCNC: 13.6 G/DL — SIGNIFICANT CHANGE UP (ref 11.5–15.5)
MCHC RBC-ENTMCNC: 29.2 PG — SIGNIFICANT CHANGE UP (ref 27–34)
MCHC RBC-ENTMCNC: 33.3 GM/DL — SIGNIFICANT CHANGE UP (ref 32–36)
MCV RBC AUTO: 87.6 FL — SIGNIFICANT CHANGE UP (ref 80–100)
NRBC # BLD: 0 /100 WBCS — SIGNIFICANT CHANGE UP (ref 0–0)
PLATELET # BLD AUTO: 275 K/UL — SIGNIFICANT CHANGE UP (ref 150–400)
RBC # BLD: 4.66 M/UL — SIGNIFICANT CHANGE UP (ref 3.8–5.2)
RBC # FLD: 12.4 % — SIGNIFICANT CHANGE UP (ref 10.3–14.5)
WBC # BLD: 9.19 K/UL — SIGNIFICANT CHANGE UP (ref 3.8–10.5)
WBC # FLD AUTO: 9.19 K/UL — SIGNIFICANT CHANGE UP (ref 3.8–10.5)

## 2020-08-27 PROCEDURE — G0463: CPT

## 2020-08-27 PROCEDURE — 85027 COMPLETE CBC AUTOMATED: CPT

## 2020-08-27 RX ORDER — SODIUM CHLORIDE 9 MG/ML
3 INJECTION INTRAMUSCULAR; INTRAVENOUS; SUBCUTANEOUS EVERY 8 HOURS
Refills: 0 | Status: DISCONTINUED | OUTPATIENT
Start: 2020-09-04 | End: 2020-09-18

## 2020-08-27 RX ORDER — LIDOCAINE HCL 20 MG/ML
0.2 VIAL (ML) INJECTION ONCE
Refills: 0 | Status: DISCONTINUED | OUTPATIENT
Start: 2020-09-04 | End: 2020-09-18

## 2020-08-27 NOTE — H&P PST ADULT - VISION (WITH CORRECTIVE LENSES IF THE PATIENT USUALLY WEARS THEM):
glasses/Normal vision: sees adequately in most situations; can see medication labels, newsprint glasses when using computer/Normal vision: sees adequately in most situations; can see medication labels, newsprint

## 2020-08-27 NOTE — H&P PST ADULT - HISTORY OF PRESENT ILLNESS
36 YO F PMH hypothyroid, migraines, seasonal allergies, infertility & IVF, PSH hysteroscopy, uterine myomectomy x3, egg retrieval x2 (last retrieval June 2020), presents to PST for D&C, DIAGNOSTIC HYSTEROSCOPY, POSSIBLE OPERATIVE HYSTEROSCOPY 9/4/2020. Denies any palpitations, SOB, N/V, fever or chills.     ***Pt to self schedule Covid test within 3-5 days prior to procedure at [address] [place] [address].

## 2020-08-27 NOTE — H&P PST ADULT - NSICDXPASTSURGICALHX_GEN_ALL_CORE_FT
PAST SURGICAL HISTORY:  Egg donor, under age 35, designated recipient egg retrieval x2 ( & )    H/O  section     H/O myomectomy laparoscopic 2011  hysteroscopy   2 myomectomies 2016  4 Total    S/P tendon repair left foot ~    S/P tonsillectomy ~

## 2020-08-27 NOTE — H&P PST ADULT - NSICDXPROBLEM_GEN_ALL_CORE_FT
PROBLEM DIAGNOSES  Problem: Intrauterine synechiae  Assessment and Plan: D&C  DIAGNOSTIC HYSTEROSCOPY  POSSIBLE OPERATIVE HYSTEROSCOPY  Pre-op education provided - all questions answered   CBC sent in PST

## 2020-08-27 NOTE — H&P PST ADULT - NSICDXPASTMEDICALHX_GEN_ALL_CORE_FT
PAST MEDICAL HISTORY:  Asthma last exacerbation "years ago"    Hyperlipidemia     Hypothyroid     Migraines     Uterine myoma PAST MEDICAL HISTORY:  Anxiety     Asthma last exacerbation "years ago"    Chronic seasonal allergic rhinitis     Depression     Female infertility     Hyperlipidemia     Hypothyroid     Migraines     Uterine myoma

## 2020-09-01 ENCOUNTER — APPOINTMENT (OUTPATIENT)
Dept: DISASTER EMERGENCY | Facility: CLINIC | Age: 35
End: 2020-09-01

## 2020-09-02 LAB — SARS-COV-2 N GENE NPH QL NAA+PROBE: NOT DETECTED

## 2020-09-03 ENCOUNTER — TRANSCRIPTION ENCOUNTER (OUTPATIENT)
Age: 35
End: 2020-09-03

## 2020-09-04 ENCOUNTER — APPOINTMENT (OUTPATIENT)
Dept: OBGYN | Facility: HOSPITAL | Age: 35
End: 2020-09-04

## 2020-09-04 ENCOUNTER — OUTPATIENT (OUTPATIENT)
Dept: OUTPATIENT SERVICES | Facility: HOSPITAL | Age: 35
LOS: 1 days | End: 2020-09-04
Payer: COMMERCIAL

## 2020-09-04 ENCOUNTER — RESULT REVIEW (OUTPATIENT)
Age: 35
End: 2020-09-04

## 2020-09-04 VITALS
RESPIRATION RATE: 14 BRPM | SYSTOLIC BLOOD PRESSURE: 100 MMHG | DIASTOLIC BLOOD PRESSURE: 55 MMHG | OXYGEN SATURATION: 95 % | HEART RATE: 75 BPM

## 2020-09-04 VITALS
WEIGHT: 187.39 LBS | OXYGEN SATURATION: 97 % | DIASTOLIC BLOOD PRESSURE: 74 MMHG | TEMPERATURE: 98 F | RESPIRATION RATE: 16 BRPM | SYSTOLIC BLOOD PRESSURE: 105 MMHG | HEART RATE: 77 BPM | HEIGHT: 64 IN

## 2020-09-04 DIAGNOSIS — Z52.811 EGG (OOCYTE) DONOR UNDER AGE 35, DESIGNATED RECIPIENT: Chronic | ICD-10-CM

## 2020-09-04 DIAGNOSIS — Z98.891 HISTORY OF UTERINE SCAR FROM PREVIOUS SURGERY: Chronic | ICD-10-CM

## 2020-09-04 DIAGNOSIS — Z01.818 ENCOUNTER FOR OTHER PREPROCEDURAL EXAMINATION: ICD-10-CM

## 2020-09-04 DIAGNOSIS — N85.6 INTRAUTERINE SYNECHIAE: ICD-10-CM

## 2020-09-04 DIAGNOSIS — Z98.890 OTHER SPECIFIED POSTPROCEDURAL STATES: Chronic | ICD-10-CM

## 2020-09-04 DIAGNOSIS — Z90.89 ACQUIRED ABSENCE OF OTHER ORGANS: Chronic | ICD-10-CM

## 2020-09-04 DIAGNOSIS — Z98.89 OTHER SPECIFIED POSTPROCEDURAL STATES: Chronic | ICD-10-CM

## 2020-09-04 PROCEDURE — 58559 HYSTEROSCOPY LYSIS: CPT

## 2020-09-04 PROCEDURE — 88305 TISSUE EXAM BY PATHOLOGIST: CPT

## 2020-09-04 PROCEDURE — 58559 HYSTEROSCOPY LYSIS: CPT | Mod: 80

## 2020-09-04 PROCEDURE — 88305 TISSUE EXAM BY PATHOLOGIST: CPT | Mod: 26

## 2020-09-04 RX ORDER — SODIUM CHLORIDE 9 MG/ML
1000 INJECTION, SOLUTION INTRAVENOUS
Refills: 0 | Status: DISCONTINUED | OUTPATIENT
Start: 2020-09-04 | End: 2020-09-18

## 2020-09-04 RX ORDER — OXYCODONE HYDROCHLORIDE 5 MG/1
5 TABLET ORAL ONCE
Refills: 0 | Status: DISCONTINUED | OUTPATIENT
Start: 2020-09-04 | End: 2020-09-04

## 2020-09-04 RX ORDER — ONDANSETRON 8 MG/1
1 TABLET, FILM COATED ORAL
Qty: 20 | Refills: 0
Start: 2020-09-04

## 2020-09-04 RX ORDER — ONDANSETRON 8 MG/1
4 TABLET, FILM COATED ORAL ONCE
Refills: 0 | Status: COMPLETED | OUTPATIENT
Start: 2020-09-04 | End: 2020-09-04

## 2020-09-04 RX ORDER — FENTANYL CITRATE 50 UG/ML
25 INJECTION INTRAVENOUS
Refills: 0 | Status: DISCONTINUED | OUTPATIENT
Start: 2020-09-04 | End: 2020-09-04

## 2020-09-04 RX ADMIN — FENTANYL CITRATE 25 MICROGRAM(S): 50 INJECTION INTRAVENOUS at 15:07

## 2020-09-04 RX ADMIN — FENTANYL CITRATE 25 MICROGRAM(S): 50 INJECTION INTRAVENOUS at 15:27

## 2020-09-04 RX ADMIN — OXYCODONE HYDROCHLORIDE 5 MILLIGRAM(S): 5 TABLET ORAL at 15:07

## 2020-09-04 RX ADMIN — ONDANSETRON 4 MILLIGRAM(S): 8 TABLET, FILM COATED ORAL at 15:06

## 2020-09-04 NOTE — ASU DISCHARGE PLAN (ADULT/PEDIATRIC) - CALL YOUR DOCTOR IF YOU HAVE ANY OF THE FOLLOWING:
Swelling that gets worse/Wound/Surgical Site with redness, or foul smelling discharge or pus/Excessive diarrhea/Bleeding that does not stop/Nausea and vomiting that does not stop/Unable to urinate/Inability to tolerate liquids or foods/Pain not relieved by Medications/Fever greater than (need to indicate Fahrenheit or Celsius)/Numbness, tingling, color or temperature change to extremity/Increased irritability or sluggishness

## 2020-09-04 NOTE — ASU DISCHARGE PLAN (ADULT/PEDIATRIC) - PROVIDER TOKENS
PROVIDER:[TOKEN:[[id number]],FOLLOWUP:[2 weeks],ESTABLISHEDPATIENT:[T]] PROVIDER:[TOKEN:[[id number]],SCHEDULEDAPPT:[09/11/2020],SCHEDULEDAPPTTIME:[02:30 PM],ESTABLISHEDPATIENT:[T]]

## 2020-09-04 NOTE — ASU PATIENT PROFILE, ADULT - PMH
Anxiety    Asthma  last exacerbation "years ago"  Chronic seasonal allergic rhinitis    Depression    Female infertility    Hyperlipidemia    Hypothyroid    Migraines    Uterine myoma

## 2020-09-04 NOTE — ASU PATIENT PROFILE, ADULT - VISION (WITH CORRECTIVE LENSES IF THE PATIENT USUALLY WEARS THEM):
Normal vision: sees adequately in most situations; can see medication labels, newsprint/glasses when using computer

## 2020-09-04 NOTE — BRIEF OPERATIVE NOTE - OPERATION/FINDINGS
Anteverted uterus. Dense intrauterine adhesions largely obliterating uterine cavity, more on left than on right

## 2020-09-04 NOTE — ASU DISCHARGE PLAN (ADULT/PEDIATRIC) - NURSING INSTRUCTIONS
Tylenol/acetaminophen----------------AND/OR----------------Motrin/ibuprofen  as needed for pain/discomfort.   NEXT DOSES:   Tylenol OK @ 7:00pm this evening, if needed.  Motrin OK @  8:10pm this evening, if needed.  Follow up with MD as requested; call office for appointment.

## 2020-09-04 NOTE — ASU DISCHARGE PLAN (ADULT/PEDIATRIC) - ACTIVITY LEVEL
No intercourse/No douching/Nothing per rectum/Nothing per vagina/No tub baths/No tampons No heavy lifting/No intercourse/No douching/Nothing per rectum/Nothing per vagina/No tampons/No tub baths

## 2020-09-04 NOTE — ASU PATIENT PROFILE, ADULT - BRADEN SCORE (IF 18 OR LESS ACTIVATE SKIN INJURY RISK INCREASED GUIDELINE), MLM
Ortho / Neurosurgery NP Note POD# 8  s/p RIGHT FEMUR INSERTION INTRA MEDULLARY NAIL Off isolation - COVID test negative x2 Pt resting in bed, PCT at bedside assisting with breakfast. 
Hx Dementia. Much more awake and alert today, following commands. Roll belt and bed alarm in place Normothermic - bear hugger off. Room air. Remote tele - AFib, HR in 70s, no ectopy noted HTN - prn hydralazine and labetalol per IM Visit Vitals /68 (BP 1 Location: Right arm, BP Patient Position: At rest) Pulse 76 Temp 96 °F (35.6 °C) Resp 16 Ht 5' 3\" (1.6 m) Wt 71.1 kg (156 lb 11.2 oz) SpO2 99% BMI 27.76 kg/m² Voiding status: George reinserted for retention Output (mL) Urine Voided: 200 ml (05/07/20 2125) Last Bowel Movement Date: 05/10/20 (05/11/20 2054) Unmeasurable Output Urine Occurrence(s): 1 (05/07/20 0845) Stool Occurrence(s): 1 (05/10/20 0207) Straight Cath Straight Cath: Nurse performed cath (05/07/20 0534) Number of Attempts: 1 (05/07/20 0534) Catheter Size: 16 FR (05/07/20 0534) Time Catheter Inserted: 4003 (05/07/20 0534) Time Catheter Removed: 0530 (05/07/20 0534) Urine: 350 mL (05/07/20 0534) Labs Lab Results Component Value Date/Time HGB 8.8 (L) 05/12/2020 08:17 AM  
  
Lab Results Component Value Date/Time INR 1.1 05/03/2020 02:27 AM  
  
Lab Results Component Value Date/Time Sodium 145 05/12/2020 08:17 AM  
 Potassium 4.0 05/12/2020 08:17 AM  
 Chloride 114 (H) 05/12/2020 08:17 AM  
 CO2 25 05/12/2020 08:17 AM  
 Glucose 100 05/12/2020 08:17 AM  
 BUN 22 (H) 05/12/2020 08:17 AM  
 Creatinine 0.76 05/12/2020 08:17 AM  
 Calcium 8.7 05/12/2020 08:17 AM  
 
Recent Glucose Results:  
Lab Results Component Value Date/Time  05/12/2020 08:17 AM  
 GLUCPOC 111 (H) 05/12/2020 07:40 AM  
 GLUCPOC 143 (H) 05/11/2020 09:44 PM  
 GLUCPOC 125 (H) 05/11/2020 04:08 PM  
 
CXR (5/5) - bilateral interstitial infiltrates Body mass index is 27.76 kg/m². : A BMI > 30 is classified as obesity and > 40 is classified as morbid obesity. AO to self. Audible upper airway congestions on entering room. Improved lung sounds - anterior with improved coarseness Posterior upper/mid/lower lungs improved coarseness, diminished in bases Very weak non-productive, congested cough. Optifoam dressing is c/d/i Calves soft and supple; No pain with passive stretch Assessment limited due to cognition; moving all extremities. PF/DF/EHL intact Abd soft, +BS, LBM 5/10 SCDs for mechanical DVT proph while in bed PLAN: 
1) PT BID, OT - WBAT 2) Aspirin 325 mg PO BID for DVT Prophylaxis 3) GI Prophylaxis - Protonix 4) Dementia - fall prevention/safety, bed alarm, roll belt. Haldol PRN. 5) Urinary Retention - UTI POA, ampillicin course completed. Gil reinserted on 5/8 for retention 6) Leukocytosis - WBC 15.5 (5/11). Blood and urine cultures sent 5/10, so far negative. CXR massiel pleural effusions. IM started on Zosyn today for PNA. No other active signs of infection. Normothermic today. 7) Readniess for discharge: 
   [x] Vital Signs stable  
 [x] Hgb stable  
 [] + Voiding - gli for retention  
 [x] Wound intact, drainage minimal  
 [x] Diet - Speech following;  \"Continue Puree diet/ thin liquids. Single straw sips ok STRICT upright PO with any PO!! Feeding asssit - CHECK FOR POCKETING 
     CRUSH MEDS\" [x] Adequate pain control on oral medication alone Will need SNF at discharge - patient from [facility]. Stable for discharge from ortho standpoint.   
Plans to facetime with family this morning, thanks to communication team.  
  
 
Martha Hudson NP 
 
 
 23

## 2020-09-04 NOTE — ASU DISCHARGE PLAN (ADULT/PEDIATRIC) - CARE PROVIDER_API CALL
Wilbert Hsieh  OBSTETRICS AND GYNECOLOGY  [address] [place] [address], [address]  [place] [address]  Phone: [phone]  Fax: [phone]  Established Patient  Follow Up Time: 2 weeks Wilbert Hsieh  OBSTETRICS AND GYNECOLOGY  [address] [place] [address], [address]  [place] [address]  Phone: [phone]  Fax: [phone]  Established Patient  Scheduled Appointment: 09/11/2020 02:30 PM

## 2020-09-04 NOTE — ASU DISCHARGE PLAN (ADULT/PEDIATRIC) - ASU DC SPECIAL INSTRUCTIONSFT
Ibuprofen and Acetaminophen as needed for pain.     Regular diet as tolerated, regular activity as tolerated, no heavy lifting for first two weeks.      Nothing per vagina: no intercourse, tampons or douching.  Call your provider if you experience fevers, chills, worsening abdominal pain, inability to urinate or worsening vaginal bleeding.    Follow up with your provider in 2 weeks. There is a small balloon inside of the uterus that we placed to prevent scar tissue from forming again. This balloon has a thin tube that is attached to it that is tucked inside the vagina. Thus tube will frequently fall out, this is normal, do not pull on it, all you need to do is place it back inside the vagina. You only need to call the office if the round balloon itself falls out.     You will need to return to Dr. Hsieh's office on 9/11/20 at 14:30 to have it removed. In the meantime, you will be taking antibiotics (doxycycline) to prevent infection while it is in place. Additionally, you will also be taking two 1 mg estrogen tablets once a day to also help prevent new scar tissue from forming. You are being prescribed medication for nausea because both the estrogen and the antibiotics might causes nausea as a side effect. If the two 1 mg estrogen tablets make you too nauseous, you may take the pills as one 1 mg tablet twice a day instead of the two 1 mg tablets once a day.

## 2020-09-04 NOTE — ASU PATIENT PROFILE, ADULT - PSH
Egg donor, under age 35, designated recipient  egg retrieval x2 ( & )  H/O  section    H/O myomectomy  laparoscopic 2011  hysteroscopy   2 myomectomies 2016  4 Total  S/P tendon repair  left foot ~  S/P tonsillectomy  ~

## 2020-09-08 PROBLEM — E78.5 HYPERLIPIDEMIA, UNSPECIFIED: Chronic | Status: ACTIVE | Noted: 2020-08-27

## 2020-09-08 PROBLEM — F41.9 ANXIETY DISORDER, UNSPECIFIED: Chronic | Status: ACTIVE | Noted: 2020-08-27

## 2020-09-08 PROBLEM — N97.9 FEMALE INFERTILITY, UNSPECIFIED: Chronic | Status: ACTIVE | Noted: 2020-08-27

## 2020-09-08 PROBLEM — J30.2 OTHER SEASONAL ALLERGIC RHINITIS: Chronic | Status: ACTIVE | Noted: 2020-08-27

## 2020-09-08 PROBLEM — F32.9 MAJOR DEPRESSIVE DISORDER, SINGLE EPISODE, UNSPECIFIED: Chronic | Status: ACTIVE | Noted: 2020-08-27

## 2020-09-08 PROBLEM — G43.909 MIGRAINE, UNSPECIFIED, NOT INTRACTABLE, WITHOUT STATUS MIGRAINOSUS: Chronic | Status: ACTIVE | Noted: 2020-08-27

## 2020-09-10 LAB — SURGICAL PATHOLOGY STUDY: SIGNIFICANT CHANGE UP

## 2020-09-11 ENCOUNTER — APPOINTMENT (OUTPATIENT)
Dept: OBGYN | Facility: CLINIC | Age: 35
End: 2020-09-11
Payer: COMMERCIAL

## 2020-09-11 PROCEDURE — 99024 POSTOP FOLLOW-UP VISIT: CPT

## 2020-09-24 ENCOUNTER — APPOINTMENT (OUTPATIENT)
Dept: OBGYN | Facility: CLINIC | Age: 35
End: 2020-09-24
Payer: COMMERCIAL

## 2020-09-24 ENCOUNTER — APPOINTMENT (OUTPATIENT)
Dept: PAIN MANAGEMENT | Facility: CLINIC | Age: 35
End: 2020-09-24
Payer: COMMERCIAL

## 2020-09-24 VITALS
DIASTOLIC BLOOD PRESSURE: 74 MMHG | SYSTOLIC BLOOD PRESSURE: 108 MMHG | BODY MASS INDEX: 32.1 KG/M2 | HEART RATE: 88 BPM | WEIGHT: 188 LBS | HEIGHT: 64 IN

## 2020-09-24 VITALS — TEMPERATURE: 97.8 F

## 2020-09-24 PROCEDURE — 99212 OFFICE O/P EST SF 10 MIN: CPT

## 2020-09-24 PROCEDURE — 99214 OFFICE O/P EST MOD 30 MIN: CPT

## 2020-09-27 NOTE — HISTORY OF PRESENT ILLNESS
[FreeTextEntry1] : Patient continues with episodic headaches ...however, she is planning to go for IVF treatments

## 2020-09-27 NOTE — ASSESSMENT
[FreeTextEntry1] : Patient reports going for IVF treatments.\par \par We discussed alternative treatments during this time

## 2020-11-03 ENCOUNTER — APPOINTMENT (OUTPATIENT)
Dept: HUMAN REPRODUCTION | Facility: CLINIC | Age: 35
End: 2020-11-03
Payer: COMMERCIAL

## 2020-11-03 PROCEDURE — 99072 ADDL SUPL MATRL&STAF TM PHE: CPT

## 2020-11-03 PROCEDURE — 99213 OFFICE O/P EST LOW 20 MIN: CPT | Mod: 25

## 2020-11-03 PROCEDURE — 76830 TRANSVAGINAL US NON-OB: CPT

## 2020-11-03 PROCEDURE — 36415 COLL VENOUS BLD VENIPUNCTURE: CPT

## 2020-11-06 ENCOUNTER — APPOINTMENT (OUTPATIENT)
Dept: HUMAN REPRODUCTION | Facility: CLINIC | Age: 35
End: 2020-11-06
Payer: COMMERCIAL

## 2020-11-06 PROCEDURE — 76831 ECHO EXAM UTERUS: CPT

## 2020-11-06 PROCEDURE — 99072 ADDL SUPL MATRL&STAF TM PHE: CPT

## 2020-11-06 PROCEDURE — 99213 OFFICE O/P EST LOW 20 MIN: CPT | Mod: 25

## 2020-11-06 PROCEDURE — 58340 CATHETER FOR HYSTEROGRAPHY: CPT

## 2020-11-06 PROCEDURE — 58999 UNLISTED PX FML GENITAL SYS: CPT

## 2020-11-19 ENCOUNTER — APPOINTMENT (OUTPATIENT)
Dept: OBGYN | Facility: CLINIC | Age: 35
End: 2020-11-19
Payer: COMMERCIAL

## 2020-11-19 PROCEDURE — 99072 ADDL SUPL MATRL&STAF TM PHE: CPT

## 2020-11-19 PROCEDURE — 58555 HYSTEROSCOPY DX SEP PROC: CPT

## 2020-11-23 ENCOUNTER — FORM ENCOUNTER (OUTPATIENT)
Age: 35
End: 2020-11-23

## 2021-01-04 ENCOUNTER — FORM ENCOUNTER (OUTPATIENT)
Age: 36
End: 2021-01-04

## 2021-01-08 ENCOUNTER — OUTPATIENT (OUTPATIENT)
Dept: OUTPATIENT SERVICES | Facility: HOSPITAL | Age: 36
LOS: 1 days | End: 2021-01-08
Payer: COMMERCIAL

## 2021-01-08 VITALS
TEMPERATURE: 97 F | OXYGEN SATURATION: 98 % | HEIGHT: 64 IN | WEIGHT: 190.04 LBS | RESPIRATION RATE: 16 BRPM | DIASTOLIC BLOOD PRESSURE: 73 MMHG | HEART RATE: 90 BPM | SYSTOLIC BLOOD PRESSURE: 105 MMHG

## 2021-01-08 DIAGNOSIS — Z98.891 HISTORY OF UTERINE SCAR FROM PREVIOUS SURGERY: Chronic | ICD-10-CM

## 2021-01-08 DIAGNOSIS — Z52.811 EGG (OOCYTE) DONOR UNDER AGE 35, DESIGNATED RECIPIENT: Chronic | ICD-10-CM

## 2021-01-08 DIAGNOSIS — Z90.89 ACQUIRED ABSENCE OF OTHER ORGANS: Chronic | ICD-10-CM

## 2021-01-08 DIAGNOSIS — N85.6 INTRAUTERINE SYNECHIAE: ICD-10-CM

## 2021-01-08 DIAGNOSIS — Z98.89 OTHER SPECIFIED POSTPROCEDURAL STATES: Chronic | ICD-10-CM

## 2021-01-08 DIAGNOSIS — Z01.818 ENCOUNTER FOR OTHER PREPROCEDURAL EXAMINATION: ICD-10-CM

## 2021-01-08 DIAGNOSIS — Z98.890 OTHER SPECIFIED POSTPROCEDURAL STATES: Chronic | ICD-10-CM

## 2021-01-08 LAB
HCT VFR BLD CALC: 38.4 % — SIGNIFICANT CHANGE UP (ref 34.5–45)
HGB BLD-MCNC: 12.6 G/DL — SIGNIFICANT CHANGE UP (ref 11.5–15.5)
MCHC RBC-ENTMCNC: 28.4 PG — SIGNIFICANT CHANGE UP (ref 27–34)
MCHC RBC-ENTMCNC: 32.8 GM/DL — SIGNIFICANT CHANGE UP (ref 32–36)
MCV RBC AUTO: 86.7 FL — SIGNIFICANT CHANGE UP (ref 80–100)
NRBC # BLD: 0 /100 WBCS — SIGNIFICANT CHANGE UP (ref 0–0)
PLATELET # BLD AUTO: 285 K/UL — SIGNIFICANT CHANGE UP (ref 150–400)
RBC # BLD: 4.43 M/UL — SIGNIFICANT CHANGE UP (ref 3.8–5.2)
RBC # FLD: 12.5 % — SIGNIFICANT CHANGE UP (ref 10.3–14.5)
WBC # BLD: 6.57 K/UL — SIGNIFICANT CHANGE UP (ref 3.8–10.5)
WBC # FLD AUTO: 6.57 K/UL — SIGNIFICANT CHANGE UP (ref 3.8–10.5)

## 2021-01-08 PROCEDURE — 85027 COMPLETE CBC AUTOMATED: CPT

## 2021-01-08 PROCEDURE — G0463: CPT

## 2021-01-08 RX ORDER — LIDOCAINE HCL 20 MG/ML
0.2 VIAL (ML) INJECTION ONCE
Refills: 0 | Status: DISCONTINUED | OUTPATIENT
Start: 2021-01-22 | End: 2021-02-05

## 2021-01-08 RX ORDER — CETIRIZINE HYDROCHLORIDE 10 MG/1
1 TABLET ORAL
Qty: 0 | Refills: 0 | DISCHARGE

## 2021-01-08 RX ORDER — LANOLIN ALCOHOL/MO/W.PET/CERES
1 CREAM (GRAM) TOPICAL
Qty: 0 | Refills: 0 | DISCHARGE

## 2021-01-08 RX ORDER — ACETAMINOPHEN 500 MG
2 TABLET ORAL
Qty: 0 | Refills: 0 | DISCHARGE

## 2021-01-08 RX ORDER — SODIUM CHLORIDE 9 MG/ML
3 INJECTION INTRAMUSCULAR; INTRAVENOUS; SUBCUTANEOUS EVERY 8 HOURS
Refills: 0 | Status: DISCONTINUED | OUTPATIENT
Start: 2021-01-22 | End: 2021-02-05

## 2021-01-08 RX ORDER — IBUPROFEN 200 MG
3 TABLET ORAL
Qty: 0 | Refills: 0 | DISCHARGE

## 2021-01-08 NOTE — H&P PST ADULT - OTHER CARE PROVIDERS
Dr. Hudson (endocrinology)  last visit June 2020 Dr. Hudson (endocrinology) [phone] last visit June 2020

## 2021-01-08 NOTE — H&P PST ADULT - VISION (WITH CORRECTIVE LENSES IF THE PATIENT USUALLY WEARS THEM):
glasses when using computer/Normal vision: sees adequately in most situations; can see medication labels, newsprint

## 2021-01-08 NOTE — H&P PST ADULT - NSICDXPASTMEDICALHX_GEN_ALL_CORE_FT
PAST MEDICAL HISTORY:  Anxiety     Asthma last exacerbation "years ago"    Chronic seasonal allergic rhinitis     COVID-19 virus infection with SOB, fever, c-diff colitis, but no pneumonia, no hospitalization, 11/2020    Depression     Female infertility     Hyperlipidemia     Hypothyroid     Migraines     Uterine myoma      PAST MEDICAL HISTORY:  Anxiety     Asthma last exacerbation "years ago"    Chronic seasonal allergic rhinitis     COVID-19 virus infection with SOB, fever, c-diff diarrhea (treated), but no pneumonia, no hospitalization, 11/2020    Depression     Female infertility     Hyperlipidemia     Hypothyroid     Migraines     Uterine myoma

## 2021-01-08 NOTE — H&P PST ADULT - NSICDXPASTSURGICALHX_GEN_ALL_CORE_FT
PAST SURGICAL HISTORY:  Egg donor, under age 36, designated recipient egg retrieval x2 ( & )    H/O  section     H/O myomectomy laparoscopic 2011  hysteroscopy , 2020  2 myomectomies 2016  4 Total    S/P tendon repair left foot ~    S/P tonsillectomy ~

## 2021-01-08 NOTE — H&P PST ADULT - HISTORY OF PRESENT ILLNESS
34 YO F PMH hypothyroid, migraines, seasonal allergies, infertility & IVF, PSH hysteroscopy, uterine myomectomy x4, multiple egg retrievals c/o intrauterine scar, presents to [facility] for D&C, DIAGNOSTIC HYSTEROSCOPY, POSSIBLE OPERATIVE HYSTEROSCOPY 01/21/2021. Denies any palpitations, SOB, N/V, fever or chills.     Covid test 01/19/21.

## 2021-01-08 NOTE — H&P PST ADULT - GASTROINTESTINAL COMMENTS
H/o c-diff colitis with covid-19 2 months ago, now on Hyoscyamine till March H/o c-diff diarrhea with covid-19 -  2 months ago, now on Hyoscyamine till March

## 2021-01-16 PROBLEM — U07.1 COVID-19: Chronic | Status: ACTIVE | Noted: 2021-01-08

## 2021-01-19 ENCOUNTER — OUTPATIENT (OUTPATIENT)
Dept: OUTPATIENT SERVICES | Facility: HOSPITAL | Age: 36
LOS: 1 days | End: 2021-01-19
Payer: COMMERCIAL

## 2021-01-19 DIAGNOSIS — Z98.89 OTHER SPECIFIED POSTPROCEDURAL STATES: Chronic | ICD-10-CM

## 2021-01-19 DIAGNOSIS — Z20.828 CONTACT WITH AND (SUSPECTED) EXPOSURE TO OTHER VIRAL COMMUNICABLE DISEASES: ICD-10-CM

## 2021-01-19 DIAGNOSIS — Z52.811 EGG (OOCYTE) DONOR UNDER AGE 35, DESIGNATED RECIPIENT: Chronic | ICD-10-CM

## 2021-01-19 DIAGNOSIS — Z98.890 OTHER SPECIFIED POSTPROCEDURAL STATES: Chronic | ICD-10-CM

## 2021-01-19 DIAGNOSIS — Z98.891 HISTORY OF UTERINE SCAR FROM PREVIOUS SURGERY: Chronic | ICD-10-CM

## 2021-01-19 DIAGNOSIS — Z90.89 ACQUIRED ABSENCE OF OTHER ORGANS: Chronic | ICD-10-CM

## 2021-01-19 LAB — SARS-COV-2 RNA SPEC QL NAA+PROBE: SIGNIFICANT CHANGE UP

## 2021-01-19 PROCEDURE — U0003: CPT

## 2021-01-19 PROCEDURE — C9803: CPT

## 2021-01-19 PROCEDURE — U0005: CPT

## 2021-01-21 ENCOUNTER — TRANSCRIPTION ENCOUNTER (OUTPATIENT)
Age: 36
End: 2021-01-21

## 2021-01-22 ENCOUNTER — OUTPATIENT (OUTPATIENT)
Dept: OUTPATIENT SERVICES | Facility: HOSPITAL | Age: 36
LOS: 1 days | End: 2021-01-22
Payer: COMMERCIAL

## 2021-01-22 ENCOUNTER — RESULT REVIEW (OUTPATIENT)
Age: 36
End: 2021-01-22

## 2021-01-22 ENCOUNTER — APPOINTMENT (OUTPATIENT)
Dept: OBGYN | Facility: HOSPITAL | Age: 36
End: 2021-01-22

## 2021-01-22 VITALS
DIASTOLIC BLOOD PRESSURE: 63 MMHG | OXYGEN SATURATION: 98 % | SYSTOLIC BLOOD PRESSURE: 109 MMHG | RESPIRATION RATE: 16 BRPM | TEMPERATURE: 98 F | HEART RATE: 87 BPM

## 2021-01-22 VITALS
DIASTOLIC BLOOD PRESSURE: 76 MMHG | SYSTOLIC BLOOD PRESSURE: 118 MMHG | TEMPERATURE: 98 F | OXYGEN SATURATION: 99 % | HEART RATE: 85 BPM | WEIGHT: 190.04 LBS | HEIGHT: 64 IN | RESPIRATION RATE: 18 BRPM

## 2021-01-22 DIAGNOSIS — Z98.890 OTHER SPECIFIED POSTPROCEDURAL STATES: Chronic | ICD-10-CM

## 2021-01-22 DIAGNOSIS — Z98.89 OTHER SPECIFIED POSTPROCEDURAL STATES: Chronic | ICD-10-CM

## 2021-01-22 DIAGNOSIS — Z52.811 EGG (OOCYTE) DONOR UNDER AGE 35, DESIGNATED RECIPIENT: Chronic | ICD-10-CM

## 2021-01-22 DIAGNOSIS — Z98.891 HISTORY OF UTERINE SCAR FROM PREVIOUS SURGERY: Chronic | ICD-10-CM

## 2021-01-22 DIAGNOSIS — N85.6 INTRAUTERINE SYNECHIAE: ICD-10-CM

## 2021-01-22 DIAGNOSIS — Z90.89 ACQUIRED ABSENCE OF OTHER ORGANS: Chronic | ICD-10-CM

## 2021-01-22 PROCEDURE — 58559 HYSTEROSCOPY LYSIS: CPT

## 2021-01-22 PROCEDURE — 88305 TISSUE EXAM BY PATHOLOGIST: CPT | Mod: 26

## 2021-01-22 PROCEDURE — 88305 TISSUE EXAM BY PATHOLOGIST: CPT

## 2021-01-22 RX ORDER — ONDANSETRON 8 MG/1
4 TABLET, FILM COATED ORAL ONCE
Refills: 0 | Status: DISCONTINUED | OUTPATIENT
Start: 2021-01-22 | End: 2021-02-05

## 2021-01-22 RX ORDER — OXYCODONE HYDROCHLORIDE 5 MG/1
5 TABLET ORAL ONCE
Refills: 0 | Status: DISCONTINUED | OUTPATIENT
Start: 2021-01-22 | End: 2021-01-22

## 2021-01-22 RX ORDER — ONDANSETRON 8 MG/1
1 TABLET, FILM COATED ORAL
Qty: 30 | Refills: 0
Start: 2021-01-22

## 2021-01-22 RX ORDER — SODIUM CHLORIDE 9 MG/ML
1000 INJECTION, SOLUTION INTRAVENOUS
Refills: 0 | Status: DISCONTINUED | OUTPATIENT
Start: 2021-01-22 | End: 2021-02-05

## 2021-01-22 NOTE — ASU DISCHARGE PLAN (ADULT/PEDIATRIC) - ACTIVITY LEVEL
No heavy lifting/Nothing per rectum/Nothing per vagina/No tub baths/No douching/No tampons/No intercourse

## 2021-01-22 NOTE — ASU PATIENT PROFILE, ADULT - PMH
Anxiety    Asthma  last exacerbation "years ago"  Chronic seasonal allergic rhinitis    COVID-19 virus infection  with SOB, fever, c-diff diarrhea (treated), but no pneumonia, no hospitalization, 11/2020  Depression    Female infertility    Hyperlipidemia    Hypothyroid    Migraines    Uterine myoma

## 2021-01-22 NOTE — ASU DISCHARGE PLAN (ADULT/PEDIATRIC) - ASU DC SPECIAL INSTRUCTIONSFT
There is a small balloon inside your uterus that was placed to try and prevent formation of new scar tissue. The balloon is attached to a small rubber tube that comes out through the cervix and is sitting inside of your vagina. It is normal for this tube to occasionally fall out of the vagina, as long as it is still attached inside just place the tube back into the vagina. If the balloon itself with the whole length of the tube falls out and is no longer attached, call the office. You will need to come to the office on 2/1/21 to have this balloon removed (the office will call you to schedule you a time with one of Dr. Hsieh's partners as he will be away that week, if you do not hear from the office by 1/26 please call them to make the appointment), please take the prescribed doxycycline two times a day for the duration of the time that the balloon is in.    You are additionally being prescribed estrogen to help prevent new scar tissue formation. You should take two tabs of this once a day. The estrogen may make you nauseous, this is normal, so you are additionally being prescribed anti-nausea medication to take as needed. If the nausea medication isn't enough, you can try to take one tab of estrogen two times a day instead as splitting the dose occasionally helps with the nausea.

## 2021-01-22 NOTE — ASU PATIENT PROFILE, ADULT - PSH
Egg donor, under age 35, designated recipient  egg retrieval x2 ( & )  H/O  section    H/O myomectomy  laparoscopic 2011  hysteroscopy , 2020  2 myomectomies 2016  4 Total  S/P tendon repair  left foot ~  S/P tonsillectomy  ~

## 2021-01-22 NOTE — ASU DISCHARGE PLAN (ADULT/PEDIATRIC) - CALL YOUR DOCTOR IF YOU HAVE ANY OF THE FOLLOWING:
Bleeding that does not stop/Swelling that gets worse/Pain not relieved by Medications/Fever greater than (need to indicate Fahrenheit or Celsius)/Wound/Surgical Site with redness, or foul smelling discharge or pus/Numbness, tingling, color or temperature change to extremity/Nausea and vomiting that does not stop/Unable to urinate/Inability to tolerate liquids or foods

## 2021-01-22 NOTE — BRIEF OPERATIVE NOTE - NSICDXBRIEFPROCEDURE_GEN_ALL_CORE_FT
PROCEDURES:  Dilation and curettage, uterus 22-Jan-2021 13:59:12  Jarvis Davidson  Operative hysteroscopy with fluid management system 22-Jan-2021 13:58:57  Jarvis Davidson

## 2021-01-22 NOTE — BRIEF OPERATIVE NOTE - OPERATION/FINDINGS
Extensive uterine adhesions taken down with monopolar loop under ultrasound guidance to recreate a uterine cavity.  Uterine massey left in, inflated with NS to 5cc. Extensive uterine adhesions taken down with monopolar loop under ultrasound guidance to recreate a uterine cavity.  Glycine fluid deficit 150 mL  Uterine massey left in, inflated with NS to 5cc.

## 2021-01-27 LAB — SURGICAL PATHOLOGY STUDY: SIGNIFICANT CHANGE UP

## 2021-02-01 ENCOUNTER — APPOINTMENT (OUTPATIENT)
Dept: OBGYN | Facility: CLINIC | Age: 36
End: 2021-02-01

## 2021-02-03 ENCOUNTER — APPOINTMENT (OUTPATIENT)
Dept: OBGYN | Facility: CLINIC | Age: 36
End: 2021-02-03
Payer: COMMERCIAL

## 2021-02-03 PROCEDURE — 99024 POSTOP FOLLOW-UP VISIT: CPT

## 2021-02-10 ENCOUNTER — FORM ENCOUNTER (OUTPATIENT)
Age: 36
End: 2021-02-10

## 2021-02-11 ENCOUNTER — APPOINTMENT (OUTPATIENT)
Dept: OBGYN | Facility: CLINIC | Age: 36
End: 2021-02-11
Payer: COMMERCIAL

## 2021-02-11 PROCEDURE — 99024 POSTOP FOLLOW-UP VISIT: CPT

## 2021-03-04 RX ORDER — MEDROXYPROGESTERONE ACETATE 10 MG/1
10 TABLET ORAL DAILY
Qty: 10 | Refills: 0 | Status: ACTIVE | COMMUNITY
Start: 2021-03-04 | End: 1900-01-01

## 2021-03-11 ENCOUNTER — FORM ENCOUNTER (OUTPATIENT)
Age: 36
End: 2021-03-11

## 2021-03-23 ENCOUNTER — APPOINTMENT (OUTPATIENT)
Dept: HUMAN REPRODUCTION | Facility: CLINIC | Age: 36
End: 2021-03-23
Payer: COMMERCIAL

## 2021-03-23 PROCEDURE — 99072 ADDL SUPL MATRL&STAF TM PHE: CPT

## 2021-03-23 PROCEDURE — 36415 COLL VENOUS BLD VENIPUNCTURE: CPT

## 2021-03-25 ENCOUNTER — APPOINTMENT (OUTPATIENT)
Dept: HUMAN REPRODUCTION | Facility: CLINIC | Age: 36
End: 2021-03-25
Payer: COMMERCIAL

## 2021-03-25 PROCEDURE — 99072 ADDL SUPL MATRL&STAF TM PHE: CPT

## 2021-03-25 PROCEDURE — 58999 UNLISTED PX FML GENITAL SYS: CPT

## 2021-03-25 PROCEDURE — 58340 CATHETER FOR HYSTEROGRAPHY: CPT

## 2021-03-25 PROCEDURE — 76831 ECHO EXAM UTERUS: CPT

## 2021-04-15 ENCOUNTER — NON-APPOINTMENT (OUTPATIENT)
Age: 36
End: 2021-04-15

## 2021-04-16 ENCOUNTER — APPOINTMENT (OUTPATIENT)
Dept: HUMAN REPRODUCTION | Facility: CLINIC | Age: 36
End: 2021-04-16
Payer: COMMERCIAL

## 2021-04-16 PROCEDURE — 99072 ADDL SUPL MATRL&STAF TM PHE: CPT

## 2021-04-16 PROCEDURE — 76830 TRANSVAGINAL US NON-OB: CPT

## 2021-04-16 PROCEDURE — 36415 COLL VENOUS BLD VENIPUNCTURE: CPT

## 2021-04-16 PROCEDURE — 99213 OFFICE O/P EST LOW 20 MIN: CPT | Mod: 25

## 2021-05-05 ENCOUNTER — APPOINTMENT (OUTPATIENT)
Dept: HUMAN REPRODUCTION | Facility: CLINIC | Age: 36
End: 2021-05-05
Payer: COMMERCIAL

## 2021-05-05 PROCEDURE — 99213 OFFICE O/P EST LOW 20 MIN: CPT | Mod: 25

## 2021-05-05 PROCEDURE — 99072 ADDL SUPL MATRL&STAF TM PHE: CPT

## 2021-05-05 PROCEDURE — 76830 TRANSVAGINAL US NON-OB: CPT

## 2021-05-05 PROCEDURE — 36415 COLL VENOUS BLD VENIPUNCTURE: CPT

## 2021-05-12 ENCOUNTER — APPOINTMENT (OUTPATIENT)
Dept: HUMAN REPRODUCTION | Facility: CLINIC | Age: 36
End: 2021-05-12
Payer: COMMERCIAL

## 2021-05-12 PROCEDURE — 36415 COLL VENOUS BLD VENIPUNCTURE: CPT

## 2021-05-12 PROCEDURE — 76830 TRANSVAGINAL US NON-OB: CPT

## 2021-05-12 PROCEDURE — 99213 OFFICE O/P EST LOW 20 MIN: CPT | Mod: 25

## 2021-05-12 PROCEDURE — 99072 ADDL SUPL MATRL&STAF TM PHE: CPT

## 2021-05-14 ENCOUNTER — APPOINTMENT (OUTPATIENT)
Dept: HUMAN REPRODUCTION | Facility: CLINIC | Age: 36
End: 2021-05-14
Payer: COMMERCIAL

## 2021-05-14 PROCEDURE — 99072 ADDL SUPL MATRL&STAF TM PHE: CPT

## 2021-05-14 PROCEDURE — 76830 TRANSVAGINAL US NON-OB: CPT

## 2021-05-14 PROCEDURE — 36415 COLL VENOUS BLD VENIPUNCTURE: CPT

## 2021-05-14 PROCEDURE — 99213 OFFICE O/P EST LOW 20 MIN: CPT | Mod: 25

## 2021-05-22 ENCOUNTER — APPOINTMENT (OUTPATIENT)
Dept: HUMAN REPRODUCTION | Facility: CLINIC | Age: 36
End: 2021-05-22
Payer: COMMERCIAL

## 2021-05-22 PROCEDURE — 76830 TRANSVAGINAL US NON-OB: CPT

## 2021-05-22 PROCEDURE — 36415 COLL VENOUS BLD VENIPUNCTURE: CPT

## 2021-05-22 PROCEDURE — 99213 OFFICE O/P EST LOW 20 MIN: CPT | Mod: 25

## 2021-05-28 ENCOUNTER — APPOINTMENT (OUTPATIENT)
Dept: HUMAN REPRODUCTION | Facility: CLINIC | Age: 36
End: 2021-05-28
Payer: COMMERCIAL

## 2021-05-28 PROCEDURE — 99213 OFFICE O/P EST LOW 20 MIN: CPT | Mod: 25

## 2021-05-28 PROCEDURE — 36415 COLL VENOUS BLD VENIPUNCTURE: CPT

## 2021-05-28 PROCEDURE — 76830 TRANSVAGINAL US NON-OB: CPT

## 2021-06-01 ENCOUNTER — APPOINTMENT (OUTPATIENT)
Dept: HUMAN REPRODUCTION | Facility: CLINIC | Age: 36
End: 2021-06-01
Payer: COMMERCIAL

## 2021-06-01 PROCEDURE — 36415 COLL VENOUS BLD VENIPUNCTURE: CPT

## 2021-06-01 PROCEDURE — 76830 TRANSVAGINAL US NON-OB: CPT

## 2021-06-01 PROCEDURE — 99213 OFFICE O/P EST LOW 20 MIN: CPT | Mod: 25

## 2021-06-03 ENCOUNTER — APPOINTMENT (OUTPATIENT)
Dept: HUMAN REPRODUCTION | Facility: CLINIC | Age: 36
End: 2021-06-03
Payer: COMMERCIAL

## 2021-06-03 PROCEDURE — 76830 TRANSVAGINAL US NON-OB: CPT

## 2021-06-03 PROCEDURE — 36415 COLL VENOUS BLD VENIPUNCTURE: CPT

## 2021-06-03 PROCEDURE — 99213 OFFICE O/P EST LOW 20 MIN: CPT | Mod: 25

## 2021-06-05 ENCOUNTER — APPOINTMENT (OUTPATIENT)
Dept: HUMAN REPRODUCTION | Facility: CLINIC | Age: 36
End: 2021-06-05
Payer: COMMERCIAL

## 2021-06-05 PROCEDURE — 76830 TRANSVAGINAL US NON-OB: CPT

## 2021-06-05 PROCEDURE — 36415 COLL VENOUS BLD VENIPUNCTURE: CPT

## 2021-06-05 PROCEDURE — 99213 OFFICE O/P EST LOW 20 MIN: CPT | Mod: 25

## 2021-06-06 ENCOUNTER — APPOINTMENT (OUTPATIENT)
Dept: HUMAN REPRODUCTION | Facility: CLINIC | Age: 36
End: 2021-06-06

## 2021-06-07 ENCOUNTER — APPOINTMENT (OUTPATIENT)
Dept: HUMAN REPRODUCTION | Facility: CLINIC | Age: 36
End: 2021-06-07
Payer: COMMERCIAL

## 2021-06-07 ENCOUNTER — APPOINTMENT (OUTPATIENT)
Dept: HUMAN REPRODUCTION | Facility: CLINIC | Age: 36
End: 2021-06-07

## 2021-06-07 PROCEDURE — 36415 COLL VENOUS BLD VENIPUNCTURE: CPT

## 2021-06-08 ENCOUNTER — APPOINTMENT (OUTPATIENT)
Dept: HUMAN REPRODUCTION | Facility: CLINIC | Age: 36
End: 2021-06-08
Payer: COMMERCIAL

## 2021-06-08 PROCEDURE — 99213 OFFICE O/P EST LOW 20 MIN: CPT | Mod: 25

## 2021-06-08 PROCEDURE — 36415 COLL VENOUS BLD VENIPUNCTURE: CPT

## 2021-06-08 PROCEDURE — 76830 TRANSVAGINAL US NON-OB: CPT

## 2021-06-09 ENCOUNTER — APPOINTMENT (OUTPATIENT)
Dept: HUMAN REPRODUCTION | Facility: CLINIC | Age: 36
End: 2021-06-09
Payer: COMMERCIAL

## 2021-06-09 PROCEDURE — 36415 COLL VENOUS BLD VENIPUNCTURE: CPT

## 2021-06-25 ENCOUNTER — APPOINTMENT (OUTPATIENT)
Dept: HUMAN REPRODUCTION | Facility: CLINIC | Age: 36
End: 2021-06-25
Payer: COMMERCIAL

## 2021-06-25 PROCEDURE — 76830 TRANSVAGINAL US NON-OB: CPT

## 2021-06-25 PROCEDURE — 99213 OFFICE O/P EST LOW 20 MIN: CPT | Mod: 25

## 2021-06-25 PROCEDURE — 36415 COLL VENOUS BLD VENIPUNCTURE: CPT

## 2021-07-01 ENCOUNTER — APPOINTMENT (OUTPATIENT)
Dept: HUMAN REPRODUCTION | Facility: CLINIC | Age: 36
End: 2021-07-01
Payer: COMMERCIAL

## 2021-07-01 PROCEDURE — 36415 COLL VENOUS BLD VENIPUNCTURE: CPT

## 2021-07-01 PROCEDURE — 76830 TRANSVAGINAL US NON-OB: CPT

## 2021-07-01 PROCEDURE — 99213 OFFICE O/P EST LOW 20 MIN: CPT | Mod: 25

## 2021-07-04 ENCOUNTER — APPOINTMENT (OUTPATIENT)
Dept: HUMAN REPRODUCTION | Facility: CLINIC | Age: 36
End: 2021-07-04
Payer: COMMERCIAL

## 2021-07-04 PROCEDURE — 36415 COLL VENOUS BLD VENIPUNCTURE: CPT

## 2021-07-04 PROCEDURE — 76830 TRANSVAGINAL US NON-OB: CPT

## 2021-07-04 PROCEDURE — 99213 OFFICE O/P EST LOW 20 MIN: CPT | Mod: 25

## 2021-07-06 ENCOUNTER — APPOINTMENT (OUTPATIENT)
Dept: HUMAN REPRODUCTION | Facility: CLINIC | Age: 36
End: 2021-07-06
Payer: COMMERCIAL

## 2021-07-06 PROCEDURE — 36415 COLL VENOUS BLD VENIPUNCTURE: CPT

## 2021-07-06 PROCEDURE — 99213 OFFICE O/P EST LOW 20 MIN: CPT | Mod: 25

## 2021-07-06 PROCEDURE — 76830 TRANSVAGINAL US NON-OB: CPT

## 2021-07-07 ENCOUNTER — APPOINTMENT (OUTPATIENT)
Dept: HUMAN REPRODUCTION | Facility: CLINIC | Age: 36
End: 2021-07-07
Payer: COMMERCIAL

## 2021-07-07 PROCEDURE — 99213 OFFICE O/P EST LOW 20 MIN: CPT | Mod: 25

## 2021-07-07 PROCEDURE — 36415 COLL VENOUS BLD VENIPUNCTURE: CPT

## 2021-07-07 PROCEDURE — 76830 TRANSVAGINAL US NON-OB: CPT

## 2021-07-08 ENCOUNTER — APPOINTMENT (OUTPATIENT)
Dept: HUMAN REPRODUCTION | Facility: CLINIC | Age: 36
End: 2021-07-08
Payer: COMMERCIAL

## 2021-07-08 PROCEDURE — 76830 TRANSVAGINAL US NON-OB: CPT

## 2021-07-08 PROCEDURE — 99213 OFFICE O/P EST LOW 20 MIN: CPT | Mod: 25

## 2021-07-08 PROCEDURE — 36415 COLL VENOUS BLD VENIPUNCTURE: CPT

## 2021-07-09 ENCOUNTER — APPOINTMENT (OUTPATIENT)
Dept: HUMAN REPRODUCTION | Facility: CLINIC | Age: 36
End: 2021-07-09
Payer: COMMERCIAL

## 2021-07-09 PROCEDURE — 36415 COLL VENOUS BLD VENIPUNCTURE: CPT

## 2021-07-10 ENCOUNTER — APPOINTMENT (OUTPATIENT)
Dept: HUMAN REPRODUCTION | Facility: CLINIC | Age: 36
End: 2021-07-10

## 2021-07-13 ENCOUNTER — APPOINTMENT (OUTPATIENT)
Dept: HUMAN REPRODUCTION | Facility: CLINIC | Age: 36
End: 2021-07-13
Payer: COMMERCIAL

## 2021-07-13 PROCEDURE — 76998 US GUIDE INTRAOP: CPT

## 2021-07-13 PROCEDURE — 89352 THAWING CRYOPRESRVED EMBRYO: CPT

## 2021-07-13 PROCEDURE — 58974 EMBRYO TRANSFER INTRAUTERINE: CPT

## 2021-07-13 PROCEDURE — 89255 PREPARE EMBRYO FOR TRANSFER: CPT

## 2021-07-23 ENCOUNTER — APPOINTMENT (OUTPATIENT)
Dept: HUMAN REPRODUCTION | Facility: CLINIC | Age: 36
End: 2021-07-23
Payer: COMMERCIAL

## 2021-07-23 PROCEDURE — 99213 OFFICE O/P EST LOW 20 MIN: CPT | Mod: 25

## 2021-07-23 PROCEDURE — 36415 COLL VENOUS BLD VENIPUNCTURE: CPT

## 2021-07-28 ENCOUNTER — APPOINTMENT (OUTPATIENT)
Dept: HUMAN REPRODUCTION | Facility: CLINIC | Age: 36
End: 2021-07-28
Payer: COMMERCIAL

## 2021-07-28 PROCEDURE — 76830 TRANSVAGINAL US NON-OB: CPT

## 2021-07-28 PROCEDURE — 99213 OFFICE O/P EST LOW 20 MIN: CPT | Mod: 25

## 2021-07-28 PROCEDURE — 36415 COLL VENOUS BLD VENIPUNCTURE: CPT

## 2021-08-03 ENCOUNTER — APPOINTMENT (OUTPATIENT)
Dept: HUMAN REPRODUCTION | Facility: CLINIC | Age: 36
End: 2021-08-03
Payer: COMMERCIAL

## 2021-08-03 PROCEDURE — 99213 OFFICE O/P EST LOW 20 MIN: CPT | Mod: 25

## 2021-08-03 PROCEDURE — 76830 TRANSVAGINAL US NON-OB: CPT

## 2021-08-03 PROCEDURE — 36415 COLL VENOUS BLD VENIPUNCTURE: CPT

## 2021-08-06 ENCOUNTER — APPOINTMENT (OUTPATIENT)
Dept: HUMAN REPRODUCTION | Facility: CLINIC | Age: 36
End: 2021-08-06
Payer: COMMERCIAL

## 2021-08-06 PROCEDURE — 36415 COLL VENOUS BLD VENIPUNCTURE: CPT

## 2021-08-06 PROCEDURE — 99213 OFFICE O/P EST LOW 20 MIN: CPT | Mod: 25

## 2021-08-06 PROCEDURE — 76830 TRANSVAGINAL US NON-OB: CPT

## 2021-08-07 ENCOUNTER — APPOINTMENT (OUTPATIENT)
Dept: HUMAN REPRODUCTION | Facility: CLINIC | Age: 36
End: 2021-08-07

## 2021-08-08 ENCOUNTER — APPOINTMENT (OUTPATIENT)
Dept: HUMAN REPRODUCTION | Facility: CLINIC | Age: 36
End: 2021-08-08

## 2021-08-09 ENCOUNTER — APPOINTMENT (OUTPATIENT)
Dept: HUMAN REPRODUCTION | Facility: CLINIC | Age: 36
End: 2021-08-09
Payer: COMMERCIAL

## 2021-08-09 PROCEDURE — 36415 COLL VENOUS BLD VENIPUNCTURE: CPT

## 2021-08-10 ENCOUNTER — APPOINTMENT (OUTPATIENT)
Dept: HUMAN REPRODUCTION | Facility: CLINIC | Age: 36
End: 2021-08-10
Payer: COMMERCIAL

## 2021-08-10 PROCEDURE — 36415 COLL VENOUS BLD VENIPUNCTURE: CPT

## 2021-08-13 ENCOUNTER — APPOINTMENT (OUTPATIENT)
Dept: HUMAN REPRODUCTION | Facility: CLINIC | Age: 36
End: 2021-08-13
Payer: COMMERCIAL

## 2021-08-13 PROCEDURE — 89352 THAWING CRYOPRESRVED EMBRYO: CPT

## 2021-08-13 PROCEDURE — 89253 EMBRYO HATCHING: CPT

## 2021-08-13 PROCEDURE — 76830 TRANSVAGINAL US NON-OB: CPT | Mod: 59

## 2021-08-13 PROCEDURE — 76998 US GUIDE INTRAOP: CPT | Mod: 59

## 2021-08-13 PROCEDURE — 99213 OFFICE O/P EST LOW 20 MIN: CPT | Mod: 25

## 2021-08-13 PROCEDURE — 89255 PREPARE EMBRYO FOR TRANSFER: CPT

## 2021-08-13 PROCEDURE — 58974 EMBRYO TRANSFER INTRAUTERINE: CPT

## 2021-08-23 ENCOUNTER — APPOINTMENT (OUTPATIENT)
Dept: HUMAN REPRODUCTION | Facility: CLINIC | Age: 36
End: 2021-08-23
Payer: COMMERCIAL

## 2021-08-23 PROCEDURE — 36415 COLL VENOUS BLD VENIPUNCTURE: CPT

## 2021-08-25 ENCOUNTER — APPOINTMENT (OUTPATIENT)
Dept: HUMAN REPRODUCTION | Facility: CLINIC | Age: 36
End: 2021-08-25
Payer: COMMERCIAL

## 2021-08-25 PROCEDURE — 36415 COLL VENOUS BLD VENIPUNCTURE: CPT

## 2021-08-30 ENCOUNTER — APPOINTMENT (OUTPATIENT)
Dept: ULTRASOUND IMAGING | Facility: CLINIC | Age: 36
End: 2021-08-30
Payer: COMMERCIAL

## 2021-08-30 ENCOUNTER — APPOINTMENT (OUTPATIENT)
Dept: HUMAN REPRODUCTION | Facility: CLINIC | Age: 36
End: 2021-08-30
Payer: COMMERCIAL

## 2021-08-30 ENCOUNTER — RESULT REVIEW (OUTPATIENT)
Age: 36
End: 2021-08-30

## 2021-08-30 PROCEDURE — 36415 COLL VENOUS BLD VENIPUNCTURE: CPT

## 2021-08-30 PROCEDURE — 76801 OB US < 14 WKS SINGLE FETUS: CPT

## 2021-08-30 PROCEDURE — 99213 OFFICE O/P EST LOW 20 MIN: CPT | Mod: 25

## 2021-08-30 PROCEDURE — 76817 TRANSVAGINAL US OBSTETRIC: CPT

## 2021-08-30 PROCEDURE — 76817 TRANSVAGINAL US OBSTETRIC: CPT | Mod: 59

## 2021-09-03 ENCOUNTER — APPOINTMENT (OUTPATIENT)
Dept: HUMAN REPRODUCTION | Facility: CLINIC | Age: 36
End: 2021-09-03
Payer: COMMERCIAL

## 2021-09-03 PROCEDURE — 99213 OFFICE O/P EST LOW 20 MIN: CPT | Mod: 25

## 2021-09-03 PROCEDURE — 76817 TRANSVAGINAL US OBSTETRIC: CPT

## 2021-09-03 PROCEDURE — 36415 COLL VENOUS BLD VENIPUNCTURE: CPT

## 2021-09-07 ENCOUNTER — APPOINTMENT (OUTPATIENT)
Dept: HUMAN REPRODUCTION | Facility: CLINIC | Age: 36
End: 2021-09-07
Payer: COMMERCIAL

## 2021-09-07 PROCEDURE — 99213 OFFICE O/P EST LOW 20 MIN: CPT | Mod: 25

## 2021-09-07 PROCEDURE — 36415 COLL VENOUS BLD VENIPUNCTURE: CPT

## 2021-09-07 PROCEDURE — 76817 TRANSVAGINAL US OBSTETRIC: CPT

## 2021-09-15 ENCOUNTER — APPOINTMENT (OUTPATIENT)
Dept: HUMAN REPRODUCTION | Facility: CLINIC | Age: 36
End: 2021-09-15
Payer: COMMERCIAL

## 2021-09-15 PROCEDURE — 76817 TRANSVAGINAL US OBSTETRIC: CPT

## 2021-09-15 PROCEDURE — 99213 OFFICE O/P EST LOW 20 MIN: CPT | Mod: 25

## 2021-09-15 PROCEDURE — 36415 COLL VENOUS BLD VENIPUNCTURE: CPT

## 2021-09-26 ENCOUNTER — FORM ENCOUNTER (OUTPATIENT)
Age: 36
End: 2021-09-26

## 2021-10-03 ENCOUNTER — FORM ENCOUNTER (OUTPATIENT)
Age: 36
End: 2021-10-03

## 2021-10-04 ENCOUNTER — APPOINTMENT (OUTPATIENT)
Dept: OBGYN | Facility: CLINIC | Age: 36
End: 2021-10-04
Payer: COMMERCIAL

## 2021-10-04 VITALS
SYSTOLIC BLOOD PRESSURE: 115 MMHG | HEIGHT: 64 IN | BODY MASS INDEX: 33.29 KG/M2 | WEIGHT: 195 LBS | HEART RATE: 118 BPM | DIASTOLIC BLOOD PRESSURE: 79 MMHG

## 2021-10-04 PROCEDURE — 0500F INITIAL PRENATAL CARE VISIT: CPT

## 2021-10-04 PROCEDURE — 36415 COLL VENOUS BLD VENIPUNCTURE: CPT

## 2021-10-04 PROCEDURE — 76817 TRANSVAGINAL US OBSTETRIC: CPT

## 2021-10-05 ENCOUNTER — FORM ENCOUNTER (OUTPATIENT)
Age: 36
End: 2021-10-05

## 2021-10-06 ENCOUNTER — FORM ENCOUNTER (OUTPATIENT)
Age: 36
End: 2021-10-06

## 2021-10-13 ENCOUNTER — NON-APPOINTMENT (OUTPATIENT)
Age: 36
End: 2021-10-13

## 2021-10-13 DIAGNOSIS — Z78.9 OTHER SPECIFIED HEALTH STATUS: ICD-10-CM

## 2021-10-13 DIAGNOSIS — Z34.90 ENCOUNTER FOR SUPERVISION OF NORMAL PREGNANCY, UNSPECIFIED, UNSPECIFIED TRIMESTER: ICD-10-CM

## 2021-10-13 DIAGNOSIS — Z86.018 PERSONAL HISTORY OF OTHER BENIGN NEOPLASM: ICD-10-CM

## 2021-10-13 DIAGNOSIS — Z87.42 PERSONAL HISTORY OF OTHER DISEASES OF THE FEMALE GENITAL TRACT: ICD-10-CM

## 2021-10-13 DIAGNOSIS — Z87.09 PERSONAL HISTORY OF OTHER DISEASES OF THE RESPIRATORY SYSTEM: ICD-10-CM

## 2021-10-13 DIAGNOSIS — Z92.29 PERSONAL HISTORY OF OTHER DRUG THERAPY: ICD-10-CM

## 2021-10-18 ENCOUNTER — NON-APPOINTMENT (OUTPATIENT)
Age: 36
End: 2021-10-18

## 2021-10-18 ENCOUNTER — FORM ENCOUNTER (OUTPATIENT)
Age: 36
End: 2021-10-18

## 2021-10-19 ENCOUNTER — NON-APPOINTMENT (OUTPATIENT)
Age: 36
End: 2021-10-19

## 2021-10-19 ENCOUNTER — APPOINTMENT (OUTPATIENT)
Dept: OBGYN | Facility: CLINIC | Age: 36
End: 2021-10-19
Payer: COMMERCIAL

## 2021-10-19 ENCOUNTER — FORM ENCOUNTER (OUTPATIENT)
Age: 36
End: 2021-10-19

## 2021-10-19 ENCOUNTER — ASOB RESULT (OUTPATIENT)
Age: 36
End: 2021-10-19

## 2021-10-19 VITALS
WEIGHT: 196 LBS | HEIGHT: 64 IN | SYSTOLIC BLOOD PRESSURE: 111 MMHG | HEART RATE: 99 BPM | DIASTOLIC BLOOD PRESSURE: 73 MMHG | BODY MASS INDEX: 33.46 KG/M2

## 2021-10-19 PROCEDURE — 76813 OB US NUCHAL MEAS 1 GEST: CPT | Mod: 59

## 2021-10-19 PROCEDURE — 76801 OB US < 14 WKS SINGLE FETUS: CPT

## 2021-10-19 RX ORDER — DOXYLAMINE SUCCINATE AND PYRIDOXINE HYDROCHLORIDE 10; 10 MG/1; MG/1
10-10 TABLET, DELAYED RELEASE ORAL AT BEDTIME
Qty: 30 | Refills: 0 | Status: ACTIVE | COMMUNITY
Start: 2021-10-19 | End: 1900-01-01

## 2021-10-20 ENCOUNTER — LABORATORY RESULT (OUTPATIENT)
Age: 36
End: 2021-10-20

## 2021-10-25 ENCOUNTER — TRANSCRIPTION ENCOUNTER (OUTPATIENT)
Age: 36
End: 2021-10-25

## 2021-10-26 ENCOUNTER — FORM ENCOUNTER (OUTPATIENT)
Age: 36
End: 2021-10-26

## 2021-10-26 ENCOUNTER — NON-APPOINTMENT (OUTPATIENT)
Age: 36
End: 2021-10-26

## 2021-10-27 ENCOUNTER — APPOINTMENT (OUTPATIENT)
Dept: OBGYN | Facility: CLINIC | Age: 36
End: 2021-10-27
Payer: COMMERCIAL

## 2021-10-27 VITALS
DIASTOLIC BLOOD PRESSURE: 72 MMHG | WEIGHT: 200 LBS | SYSTOLIC BLOOD PRESSURE: 116 MMHG | BODY MASS INDEX: 34.15 KG/M2 | HEIGHT: 64 IN

## 2021-10-27 DIAGNOSIS — N76.0 ACUTE VAGINITIS: ICD-10-CM

## 2021-10-27 PROCEDURE — 0502F SUBSEQUENT PRENATAL CARE: CPT

## 2021-10-29 LAB
CANDIDA VAG CYTO: NOT DETECTED
G VAGINALIS+PREV SP MTYP VAG QL MICRO: NOT DETECTED
T VAGINALIS VAG QL WET PREP: NOT DETECTED

## 2021-10-31 ENCOUNTER — TRANSCRIPTION ENCOUNTER (OUTPATIENT)
Age: 36
End: 2021-10-31

## 2021-11-01 ENCOUNTER — TRANSCRIPTION ENCOUNTER (OUTPATIENT)
Age: 36
End: 2021-11-01

## 2021-11-01 DIAGNOSIS — Z00.00 ENCOUNTER FOR GENERAL ADULT MEDICAL EXAMINATION W/OUT ABNORMAL FINDINGS: ICD-10-CM

## 2021-11-01 DIAGNOSIS — Z11.51 ENCOUNTER FOR SCREENING FOR HUMAN PAPILLOMAVIRUS (HPV): ICD-10-CM

## 2021-11-03 ENCOUNTER — APPOINTMENT (OUTPATIENT)
Dept: OBGYN | Facility: CLINIC | Age: 36
End: 2021-11-03
Payer: COMMERCIAL

## 2021-11-03 VITALS
HEIGHT: 64 IN | WEIGHT: 198 LBS | DIASTOLIC BLOOD PRESSURE: 77 MMHG | RESPIRATION RATE: 16 BRPM | SYSTOLIC BLOOD PRESSURE: 115 MMHG | OXYGEN SATURATION: 97 % | HEART RATE: 100 BPM | BODY MASS INDEX: 33.8 KG/M2

## 2021-11-03 DIAGNOSIS — Z23 ENCOUNTER FOR IMMUNIZATION: ICD-10-CM

## 2021-11-03 DIAGNOSIS — R30.0 DYSURIA: ICD-10-CM

## 2021-11-03 PROCEDURE — 76817 TRANSVAGINAL US OBSTETRIC: CPT

## 2021-11-03 PROCEDURE — 87086 URINE CULTURE/COLONY COUNT: CPT

## 2021-11-03 PROCEDURE — 0502F SUBSEQUENT PRENATAL CARE: CPT

## 2021-11-04 ENCOUNTER — MED ADMIN CHARGE (OUTPATIENT)
Age: 36
End: 2021-11-04

## 2021-11-04 PROBLEM — Z23 ENCOUNTER FOR IMMUNIZATION: Status: ACTIVE | Noted: 2021-11-04

## 2021-11-07 ENCOUNTER — NON-APPOINTMENT (OUTPATIENT)
Age: 36
End: 2021-11-07

## 2021-11-07 LAB — BACTERIA UR CULT: NORMAL

## 2021-11-15 ENCOUNTER — ASOB RESULT (OUTPATIENT)
Age: 36
End: 2021-11-15

## 2021-11-15 ENCOUNTER — APPOINTMENT (OUTPATIENT)
Dept: OBGYN | Facility: CLINIC | Age: 36
End: 2021-11-15
Payer: COMMERCIAL

## 2021-11-15 VITALS
HEIGHT: 64 IN | BODY MASS INDEX: 33.97 KG/M2 | SYSTOLIC BLOOD PRESSURE: 116 MMHG | WEIGHT: 199 LBS | DIASTOLIC BLOOD PRESSURE: 70 MMHG

## 2021-11-15 DIAGNOSIS — O09.529 SUPERVISION OF ELDERLY MULTIGRAVIDA, UNSPECIFIED TRIMESTER: ICD-10-CM

## 2021-11-15 DIAGNOSIS — Z31.83 ENCOUNTER FOR ASSISTED REPRODUCTIVE FERTILITY PROCEDURE CYCLE: ICD-10-CM

## 2021-11-15 PROCEDURE — 76816 OB US FOLLOW-UP PER FETUS: CPT

## 2021-11-15 PROCEDURE — 0502F SUBSEQUENT PRENATAL CARE: CPT

## 2021-11-15 PROCEDURE — 36415 COLL VENOUS BLD VENIPUNCTURE: CPT

## 2021-11-15 PROCEDURE — 76817 TRANSVAGINAL US OBSTETRIC: CPT

## 2021-11-18 ENCOUNTER — NON-APPOINTMENT (OUTPATIENT)
Age: 36
End: 2021-11-18

## 2021-11-18 ENCOUNTER — TRANSCRIPTION ENCOUNTER (OUTPATIENT)
Age: 36
End: 2021-11-18

## 2021-11-23 ENCOUNTER — NON-APPOINTMENT (OUTPATIENT)
Age: 36
End: 2021-11-23

## 2021-11-26 LAB
1ST TRIMESTER DATA: NORMAL
2ND TRIMESTER DATA: NORMAL
ADDENDUM DOC: NORMAL
AFP PNL SERPL: NORMAL
AFP SERPL-ACNC: NORMAL
AFP SERPL-ACNC: NORMAL
B-HCG FREE SERPL-MCNC: NORMAL
CLINICAL BIOCHEMIST REVIEW: ABNORMAL
CLINICAL BIOCHEMIST REVIEW: NORMAL
CLINICAL BIOCHEMIST REVIEW: NORMAL
FREE BETA HCG 1ST TRIMESTER: NORMAL
INHIBIN A SERPL-MCNC: NORMAL
Lab: NORMAL
NASAL BONE: PRESENT
NOTES NTD: NORMAL
NT: NORMAL
PAPP-A SERPL-ACNC: NORMAL
U ESTRIOL SERPL-SCNC: NORMAL

## 2021-11-29 ENCOUNTER — APPOINTMENT (OUTPATIENT)
Dept: ANTEPARTUM | Facility: CLINIC | Age: 36
End: 2021-11-29

## 2021-11-30 ENCOUNTER — APPOINTMENT (OUTPATIENT)
Dept: OBGYN | Facility: CLINIC | Age: 36
End: 2021-11-30

## 2021-12-10 ENCOUNTER — TRANSCRIPTION ENCOUNTER (OUTPATIENT)
Age: 36
End: 2021-12-10

## 2021-12-10 ENCOUNTER — NON-APPOINTMENT (OUTPATIENT)
Age: 36
End: 2021-12-10

## 2021-12-10 ENCOUNTER — INPATIENT (INPATIENT)
Facility: HOSPITAL | Age: 36
LOS: 2 days | Discharge: HOME | DRG: 776 | End: 2021-12-13
Attending: OBSTETRICS & GYNECOLOGY | Admitting: OBSTETRICS & GYNECOLOGY
Payer: COMMERCIAL

## 2021-12-10 VITALS
TEMPERATURE: 99 F | DIASTOLIC BLOOD PRESSURE: 59 MMHG | OXYGEN SATURATION: 96 % | SYSTOLIC BLOOD PRESSURE: 116 MMHG | RESPIRATION RATE: 19 BRPM | HEART RATE: 103 BPM

## 2021-12-10 DIAGNOSIS — Z98.891 HISTORY OF UTERINE SCAR FROM PREVIOUS SURGERY: Chronic | ICD-10-CM

## 2021-12-10 DIAGNOSIS — O26.899 OTHER SPECIFIED PREGNANCY RELATED CONDITIONS, UNSPECIFIED TRIMESTER: ICD-10-CM

## 2021-12-10 DIAGNOSIS — Z3A.00 WEEKS OF GESTATION OF PREGNANCY NOT SPECIFIED: ICD-10-CM

## 2021-12-10 DIAGNOSIS — Z90.89 ACQUIRED ABSENCE OF OTHER ORGANS: Chronic | ICD-10-CM

## 2021-12-10 DIAGNOSIS — Z98.890 OTHER SPECIFIED POSTPROCEDURAL STATES: Chronic | ICD-10-CM

## 2021-12-10 DIAGNOSIS — Z34.80 ENCOUNTER FOR SUPERVISION OF OTHER NORMAL PREGNANCY, UNSPECIFIED TRIMESTER: ICD-10-CM

## 2021-12-10 DIAGNOSIS — Z52.811 EGG (OOCYTE) DONOR UNDER AGE 35, DESIGNATED RECIPIENT: Chronic | ICD-10-CM

## 2021-12-10 DIAGNOSIS — Z98.89 OTHER SPECIFIED POSTPROCEDURAL STATES: Chronic | ICD-10-CM

## 2021-12-10 LAB
AMNISURE ROM (RUPTURE OF MEMBRANES): POSITIVE
BASOPHILS # BLD AUTO: 0.03 K/UL — SIGNIFICANT CHANGE UP (ref 0–0.2)
BASOPHILS NFR BLD AUTO: 0.4 % — SIGNIFICANT CHANGE UP (ref 0–2)
BLD GP AB SCN SERPL QL: NEGATIVE — SIGNIFICANT CHANGE UP
EOSINOPHIL # BLD AUTO: 0.22 K/UL — SIGNIFICANT CHANGE UP (ref 0–0.5)
EOSINOPHIL NFR BLD AUTO: 3.1 % — SIGNIFICANT CHANGE UP (ref 0–6)
HCT VFR BLD CALC: 30.3 % — LOW (ref 34.5–45)
HGB BLD-MCNC: 10.3 G/DL — LOW (ref 11.5–15.5)
IMM GRANULOCYTES NFR BLD AUTO: 0.7 % — SIGNIFICANT CHANGE UP (ref 0–1.5)
LYMPHOCYTES # BLD AUTO: 1.83 K/UL — SIGNIFICANT CHANGE UP (ref 1–3.3)
LYMPHOCYTES # BLD AUTO: 25.7 % — SIGNIFICANT CHANGE UP (ref 13–44)
MCHC RBC-ENTMCNC: 29.6 PG — SIGNIFICANT CHANGE UP (ref 27–34)
MCHC RBC-ENTMCNC: 34 GM/DL — SIGNIFICANT CHANGE UP (ref 32–36)
MCV RBC AUTO: 87.1 FL — SIGNIFICANT CHANGE UP (ref 80–100)
MONOCYTES # BLD AUTO: 0.74 K/UL — SIGNIFICANT CHANGE UP (ref 0–0.9)
MONOCYTES NFR BLD AUTO: 10.4 % — SIGNIFICANT CHANGE UP (ref 2–14)
NEUTROPHILS # BLD AUTO: 4.26 K/UL — SIGNIFICANT CHANGE UP (ref 1.8–7.4)
NEUTROPHILS NFR BLD AUTO: 59.7 % — SIGNIFICANT CHANGE UP (ref 43–77)
NRBC # BLD: 0 /100 WBCS — SIGNIFICANT CHANGE UP (ref 0–0)
PLATELET # BLD AUTO: 169 K/UL — SIGNIFICANT CHANGE UP (ref 150–400)
RBC # BLD: 3.48 M/UL — LOW (ref 3.8–5.2)
RBC # FLD: 15.1 % — HIGH (ref 10.3–14.5)
RH IG SCN BLD-IMP: POSITIVE — SIGNIFICANT CHANGE UP
SARS-COV-2 RNA SPEC QL NAA+PROBE: SIGNIFICANT CHANGE UP
WBC # BLD: 7.13 K/UL — SIGNIFICANT CHANGE UP (ref 3.8–10.5)
WBC # FLD AUTO: 7.13 K/UL — SIGNIFICANT CHANGE UP (ref 3.8–10.5)

## 2021-12-10 PROCEDURE — 99223 1ST HOSP IP/OBS HIGH 75: CPT

## 2021-12-10 RX ORDER — ACETAMINOPHEN 500 MG
975 TABLET ORAL ONCE
Refills: 0 | Status: COMPLETED | OUTPATIENT
Start: 2021-12-10 | End: 2021-12-10

## 2021-12-10 RX ORDER — FOLIC ACID 0.8 MG
1 TABLET ORAL DAILY
Refills: 0 | Status: DISCONTINUED | OUTPATIENT
Start: 2021-12-10 | End: 2021-12-11

## 2021-12-10 RX ADMIN — Medication 975 MILLIGRAM(S): at 23:00

## 2021-12-10 NOTE — OB RN TRIAGE NOTE - NS_OBGYNHISTORY_OBGYN_ALL_OB_FT
C/S x1 2017-placenta accreta, shortened cervix  fibroids  ovarian cysts  endometriosis  laparoscopic myomectomy  D&C x6

## 2021-12-10 NOTE — OB PROVIDER H&P - NSHPPHYSICALEXAM_GEN_ALL_CORE
Vital Signs Last 24 Hrs  T(C): 36.6 (10 Dec 2021 20:00), Max: 37.2 (10 Dec 2021 18:26)  T(F): 97.88 (10 Dec 2021 20:00), Max: 99 (10 Dec 2021 18:38)  HR: 105 (10 Dec 2021 20:51) (95 - 107)  BP: 116/59 (10 Dec 2021 18:38) (116/59 - 116/59)  BP(mean): --  RR: 18 (10 Dec 2021 20:00) (18 - 19)  SpO2: 92% (10 Dec 2021 20:51) (89% - 100%)    GA: NAD, intermittent coughing  Cards: RRR  Pulm: CTAB  EFH: n/a  [place]: acontractile   Speculum: +pooling, + nitrazine, - ferning. Cervical appears closed.  VE: deferred given complete previa  TVUS: CL 3.3-3.5, no dynamic changes  TAUS: vtx, , fetal movements noted, complete previa visualized, MPV 5.5

## 2021-12-10 NOTE — OB PROVIDER H&P - ASSESSMENT
35y/o  at 19w5d with complete placenta previa and concern for placenta percreta with bladder invasion  37y/o  at 19w5d with complete placenta previa and concern for placenta percreta with bladder invasion admitted with concern for previable  rupture of membranes.     #Concern for PPPROM  -Small pooling noted in posterior fornix, nitrazine positive, ferning negative. Amnisure sent and positive  -Good fluid around fetus at this time, MVP 5.5   -Given highly risk pregnancy 2/2 to previa and concern for percreta and highly desired pregnancy, will monitor closely overnight and plan for repeat exam and JADA in AM.  -No signs/symptoms of chorioamnionitis at this time, abdomen soft and nontender, WBC pending     #Complete previa, concern for placenta percreta with bladder invasion  -Suspicion high given pt's history and AFP and bhcg 99%  -Last [facility] scan , ultrasound findings suspicious for abnormal placentation, recommended repeat sono in 3 weeks  -No vaginal bleeding since arrival  -Given concern for PPPROM and high suspicion for abnormal placentation, will obtain MRI abdomen/pelvis to further assess placenta  -Pending results of MRI and repeat speculum/JADA , pt to be counseled on management options    -MFM aware of pt     #Cough  -COVID PCR neg  -Tessalon pearls/Tylenol PRN    #Maternal Well Being  -NPO for MRI  -Continue home synthroid  -SCDs for DVT ppx     D/w Dr. Ole mccall pgy4 37y/o  at 19w5d with complete placenta previa and concern for placenta percreta with bladder invasion admitted with concern for previable  rupture of membranes.     #Concern for PPPROM  -Small pooling noted in posterior fornix, nitrazine positive, ferning negative. Amnisure sent and positive  -Good fluid around fetus at this time, MVP 5.5   -Given highly risk pregnancy 2/2 to previa and concern for percreta and highly desired pregnancy, will monitor closely overnight and plan for repeat exam and JADA in AM.  -No signs/symptoms of chorioamnionitis at this time, abdomen soft and nontender, WBC 7 on admission     #Complete previa, concern for placenta percreta with bladder invasion  -Suspicion high given pt's history and AFP and bhcg 99%  -Last [facility] scan , ultrasound findings suspicious for abnormal placentation, recommended repeat sono in 3 weeks  -No vaginal bleeding since arrival  -Given concern for PPPROM and high suspicion for abnormal placentation, will obtain MRI abdomen/pelvis to further assess placenta  -Pending results of MRI and repeat speculum/JADA , pt to be counseled on management options    -MFM aware of pt     #Cough  -COVID PCR neg  -Tessalon pearls/Tylenol PRN    #Maternal Well Being  -NPO for MRI  -Continue home synthroid  -SCDs for DVT ppx     D/w Dr. Ole mccall pgy4

## 2021-12-10 NOTE — OB RN TRIAGE NOTE - FALL HARM RISK - UNIVERSAL INTERVENTIONS
Bed in lowest position, wheels locked, appropriate side rails in place/Call bell, personal items and telephone in reach/Instruct patient to call for assistance before getting out of bed or chair/Non-slip footwear when patient is out of bed/[place] to call system/Physically safe environment - no spills, clutter or unnecessary equipment/Purposeful Proactive Rounding/Room/bathroom lighting operational, light cord in reach

## 2021-12-10 NOTE — OB PROVIDER H&P - HISTORY OF PRESENT ILLNESS
Pt is a 35y/o  at 19w5d presented with concern for rupture of membranes. Pt reports viral-like illness with cough over the past 3 days, COVID test negative outpatient 2 days ago. Pt reports she experienced a large gush of fluid at 3pm today while coughing. Fluid soaked through underwear and car seat. Denies continuous trickle of fluid, but reports small episodes of leaking associated with coughing. Reports cramping throughout pregnancy, no increase today. Denies VB. Has not yet felt fetal movements.     Prenatal course c/b concern for placenta percreta into bladder, detected on [facility] sono  with Dr. Shepard.          OBHx:  GynHx:  PMHx:  PSHx:  Med:  All:  SH:     Pt is a 35y/o  at 19w5d presented with concern for rupture of membranes. Pt reports viral-like illness with cough over the past 3 days, COVID test negative outpatient 2 days ago. Pt reports she experienced a large gush of fluid at 3pm today while coughing. Fluid soaked through underwear and car seat. Denies continuous trickle of fluid, but reports small episodes of leaking associated with coughing. Reports cramping throughout pregnancy, no increase today. Denies VB. Has not yet felt fetal movements.     Prenatal course c/b complete placenta previa, concern for placenta percreta into bladder, commented on MFM sono  with Dr. Shepard, AFP 99% and free bhcg 99% significantly elevating risk for abnormal placentation        OBHx: 2017 pLTCS due to hx of multiple uterine surgeries c/b focal accreta (performed at [facility], Op note not currently available for review) 5#15. Cervical shortening also noted in 3rd trimester   GynHx: Lsc myomectomy x2  (Dr. Mcbride, [place]), D&C/hysteroscopy with myomectomy x2  (Dr. Hsieh), D&C/hysteroscopy removal of Ashermann's  (Dr. Hsieh)   PMHx: hypothyroidism, ashtma  PSHx: as above  Med: synthroid 200mcg, , Claritin   All: NKDA  SH: neg x3

## 2021-12-10 NOTE — OB RN TRIAGE NOTE - NSICDXPASTMEDICALHX_GEN_ALL_CORE_FT
PAST MEDICAL HISTORY:  Anxiety     Asthma last exacerbation "years ago"    Chronic seasonal allergic rhinitis     COVID-19 virus infection with SOB, fever, c-diff diarrhea (treated), but no pneumonia, no hospitalization, 11/2020    Depression     Female infertility     Hyperlipidemia     Hypothyroid     Migraines     Uterine myoma

## 2021-12-10 NOTE — OB RN PATIENT PROFILE - CENTRAL VENOUS CATHETER
Vaccine Information Statement(s) was given today. This has been reviewed, questions answered, and verbal consent given by Patient for injection(s) and administration of Influenza (Inactivated).      Patient tolerated without incident. See immunization grid for documentation.        
no

## 2021-12-10 NOTE — OB RN PATIENT PROFILE - FALL HARM RISK - UNIVERSAL INTERVENTIONS
Bed in lowest position, wheels locked, appropriate side rails in place/Call bell, personal items and telephone in reach/Instruct patient to call for assistance before getting out of bed or chair/Non-slip footwear when patient is out of bed/[place] to call system/Physically safe environment - no spills, clutter or unnecessary equipment/Purposeful Proactive Rounding/Room/bathroom lighting operational, light cord in reach

## 2021-12-11 LAB
BASOPHILS # BLD AUTO: 0.02 K/UL — SIGNIFICANT CHANGE UP (ref 0–0.2)
BASOPHILS NFR BLD AUTO: 0.4 % — SIGNIFICANT CHANGE UP (ref 0–2)
COVID-19 SPIKE DOMAIN AB INTERP: POSITIVE
COVID-19 SPIKE DOMAIN ANTIBODY RESULT: >250 U/ML — HIGH
EOSINOPHIL # BLD AUTO: 0.18 K/UL — SIGNIFICANT CHANGE UP (ref 0–0.5)
EOSINOPHIL NFR BLD AUTO: 3.3 % — SIGNIFICANT CHANGE UP (ref 0–6)
HCT VFR BLD CALC: 30.1 % — LOW (ref 34.5–45)
HGB BLD-MCNC: 10.2 G/DL — LOW (ref 11.5–15.5)
IMM GRANULOCYTES NFR BLD AUTO: 0.2 % — SIGNIFICANT CHANGE UP (ref 0–1.5)
LYMPHOCYTES # BLD AUTO: 1.2 K/UL — SIGNIFICANT CHANGE UP (ref 1–3.3)
LYMPHOCYTES # BLD AUTO: 22.2 % — SIGNIFICANT CHANGE UP (ref 13–44)
MCHC RBC-ENTMCNC: 29.9 PG — SIGNIFICANT CHANGE UP (ref 27–34)
MCHC RBC-ENTMCNC: 33.9 GM/DL — SIGNIFICANT CHANGE UP (ref 32–36)
MCV RBC AUTO: 88.3 FL — SIGNIFICANT CHANGE UP (ref 80–100)
MONOCYTES # BLD AUTO: 0.6 K/UL — SIGNIFICANT CHANGE UP (ref 0–0.9)
MONOCYTES NFR BLD AUTO: 11.1 % — SIGNIFICANT CHANGE UP (ref 2–14)
NEUTROPHILS # BLD AUTO: 3.4 K/UL — SIGNIFICANT CHANGE UP (ref 1.8–7.4)
NEUTROPHILS NFR BLD AUTO: 62.8 % — SIGNIFICANT CHANGE UP (ref 43–77)
NRBC # BLD: 0 /100 WBCS — SIGNIFICANT CHANGE UP (ref 0–0)
PLATELET # BLD AUTO: 161 K/UL — SIGNIFICANT CHANGE UP (ref 150–400)
RBC # BLD: 3.41 M/UL — LOW (ref 3.8–5.2)
RBC # FLD: 15.3 % — HIGH (ref 10.3–14.5)
SARS-COV-2 IGG+IGM SERPL QL IA: >250 U/ML — HIGH
SARS-COV-2 IGG+IGM SERPL QL IA: POSITIVE
WBC # BLD: 5.41 K/UL — SIGNIFICANT CHANGE UP (ref 3.8–10.5)
WBC # FLD AUTO: 5.41 K/UL — SIGNIFICANT CHANGE UP (ref 3.8–10.5)

## 2021-12-11 PROCEDURE — 74181 MRI ABDOMEN W/O CONTRAST: CPT | Mod: 26

## 2021-12-11 PROCEDURE — 72195 MRI PELVIS W/O DYE: CPT | Mod: 26

## 2021-12-11 PROCEDURE — 99233 SBSQ HOSP IP/OBS HIGH 50: CPT

## 2021-12-11 PROCEDURE — 99253 IP/OBS CNSLTJ NEW/EST LOW 45: CPT | Mod: 25

## 2021-12-11 RX ORDER — ALBUTEROL 90 UG/1
1 AEROSOL, METERED ORAL
Refills: 0 | Status: DISCONTINUED | OUTPATIENT
Start: 2021-12-11 | End: 2021-12-13

## 2021-12-11 RX ORDER — INDIGOTINDISULFONATE SODIUM 8 MG/ML
10 AMPUL (ML) INJECTION ONCE
Refills: 0 | Status: DISCONTINUED | OUTPATIENT
Start: 2021-12-11 | End: 2021-12-13

## 2021-12-11 RX ORDER — ACETAMINOPHEN 500 MG
975 TABLET ORAL ONCE
Refills: 0 | Status: COMPLETED | OUTPATIENT
Start: 2021-12-11 | End: 2021-12-11

## 2021-12-11 RX ORDER — METOCLOPRAMIDE HCL 10 MG
10 TABLET ORAL ONCE
Refills: 0 | Status: COMPLETED | OUTPATIENT
Start: 2021-12-11 | End: 2021-12-11

## 2021-12-11 RX ORDER — SODIUM CHLORIDE 9 MG/ML
1000 INJECTION, SOLUTION INTRAVENOUS
Refills: 0 | Status: DISCONTINUED | OUTPATIENT
Start: 2021-12-11 | End: 2021-12-11

## 2021-12-11 RX ORDER — ACETAMINOPHEN 500 MG
975 TABLET ORAL ONCE
Refills: 0 | Status: DISCONTINUED | OUTPATIENT
Start: 2021-12-11 | End: 2021-12-13

## 2021-12-11 RX ORDER — SODIUM CHLORIDE 9 MG/ML
3 INJECTION INTRAMUSCULAR; INTRAVENOUS; SUBCUTANEOUS EVERY 8 HOURS
Refills: 0 | Status: DISCONTINUED | OUTPATIENT
Start: 2021-12-11 | End: 2021-12-13

## 2021-12-11 RX ORDER — DIPHENHYDRAMINE HCL 50 MG
25 CAPSULE ORAL ONCE
Refills: 0 | Status: COMPLETED | OUTPATIENT
Start: 2021-12-11 | End: 2021-12-11

## 2021-12-11 RX ORDER — LEVOTHYROXINE SODIUM 125 MCG
200 TABLET ORAL DAILY
Refills: 0 | Status: DISCONTINUED | OUTPATIENT
Start: 2021-12-11 | End: 2021-12-13

## 2021-12-11 RX ORDER — FOLIC ACID 0.8 MG
1 TABLET ORAL DAILY
Refills: 0 | Status: DISCONTINUED | OUTPATIENT
Start: 2021-12-11 | End: 2021-12-13

## 2021-12-11 RX ADMIN — Medication 10 MILLIGRAM(S): at 16:35

## 2021-12-11 RX ADMIN — Medication 100 MILLIGRAM(S): at 08:05

## 2021-12-11 RX ADMIN — SODIUM CHLORIDE 3 MILLILITER(S): 9 INJECTION INTRAMUSCULAR; INTRAVENOUS; SUBCUTANEOUS at 15:42

## 2021-12-11 RX ADMIN — Medication 100 MILLIGRAM(S): at 16:09

## 2021-12-11 RX ADMIN — Medication 10 MILLIGRAM(S): at 03:30

## 2021-12-11 RX ADMIN — Medication 25 MILLIGRAM(S): at 16:36

## 2021-12-11 RX ADMIN — Medication 25 MILLIGRAM(S): at 03:30

## 2021-12-11 RX ADMIN — Medication 975 MILLIGRAM(S): at 12:33

## 2021-12-11 RX ADMIN — ALBUTEROL 1 PUFF(S): 90 AEROSOL, METERED ORAL at 21:02

## 2021-12-11 RX ADMIN — Medication 1 MILLIGRAM(S): at 13:39

## 2021-12-11 RX ADMIN — Medication 100 MILLIGRAM(S): at 23:37

## 2021-12-11 RX ADMIN — Medication 1 TABLET(S): at 12:18

## 2021-12-11 RX ADMIN — Medication 975 MILLIGRAM(S): at 01:05

## 2021-12-11 RX ADMIN — Medication 100 MILLIGRAM(S): at 01:05

## 2021-12-11 RX ADMIN — Medication 200 MICROGRAM(S): at 06:41

## 2021-12-11 RX ADMIN — SODIUM CHLORIDE 125 MILLILITER(S): 9 INJECTION, SOLUTION INTRAVENOUS at 08:10

## 2021-12-11 NOTE — PROGRESS NOTE ADULT - ATTENDING COMMENTS
Patient seen and examined. Agree with above. Pt seen and re examined with Dr. Talbert and Dr. Alexandre. +nitrazine but negative pool and negative fern. MVP remains over 5cm. Exam is negative for rupture. Will admit patient and observe overnight. Will repeat the exam in the morning. If exam remains negative for rupture with discharge in am. Pt verbalized understanding of plan for follow up. Questions answered.

## 2021-12-11 NOTE — PROGRESS NOTE ADULT - ASSESSMENT
A/P: 35yo  at 19w6d with complete previa and likely placenta accreta, who came in to triage last night after feeling a large gush of fluid. Patient now s/p multiple speculum exams. Some with concern for pooling and nitrazine positive, however no ferning on multiple exams and MVP remains stable. Speculum exam at this time with no evidence of rupture and overall fluid is normal and unchanged from yesterday. Discussed with patient that at this time it is unclear whether she has ruptured as we have multiple conflicting exams, most recently without signs of rupture. Given patient with concern for accreta this makes the picture more complicated. Discussed with patient that primarily it is important to ensure we are confident with the diagnosis. If she is ruptured, patient counseled on options including expected management, hysterectomy or D&E with possible hysterectomy if necessary. Explained that if diagnosed with previable PPROM and she opts for expected management it comes with the risk of infection, hemorrhage, premature delivery. Additionally explained to the patient that for fetal lung maturation the fetus needs to have adequate amniotic fluid and therefore there is a possibility that the fetus when born is not viable. Although we reviewed many possible options, at this time we are not clear on whether the patient is ruptured and therefore we are not able to provide adequate counseling. Patient verbalized understanding and agrees with plan for inpatient monitoring and re-eval in 24 hours.   - Recommend repeat SSE and TAUS tomorrow   - Recommend monitoring for signs of infection and labor  - Patient for regular diet and inpatient observation     Carly Hirschberg, MFM Fellow  Patient seen and examined with RADHA Hernández Attendign      A/P: 35yo  at 19w6d with complete previa and likely placenta accreta, who came in to triage last night after feeling a large gush of fluid. Patient now s/p multiple speculum exams. Some with concern for pooling and nitrazine positive, however no ferning on multiple exams and MVP remains stable. Speculum exam at this time with no evidence of rupture and overall fluid is normal and unchanged from yesterday. Discussed with patient that at this time it is unclear whether she has ruptured as we have multiple conflicting exams, most recently without signs of rupture. Given patient with concern for accreta this makes the picture more complicated. Discussed with patient that primarily it is important to ensure we are confident with the diagnosis. If she is ruptured, patient counseled on options including expected management, hysterectomy or D&E with possible hysterectomy if necessary. Explained that if diagnosed with previable PPROM and she opts for expected management it comes with the risk of infection, hemorrhage, premature delivery. Additionally explained to the patient that for fetal lung maturation the fetus needs to have adequate amniotic fluid and therefore there is a possibility that the fetus when born is not viable. Although we reviewed many possible options, at this time we are not clear on whether the patient is ruptured and therefore we are not able to provide adequate counseling. Patient verbalized understanding and agrees with plan for inpatient monitoring and re-eval in 24 hours.   - Recommend repeat SSE and TAUS tomorrow   - Recommend monitoring for signs of infection and labor  - Patient for regular diet and inpatient observation     Carly Hirschberg, MFM Fellow  Patient seen and examined with RADHA Hernández Attending

## 2021-12-11 NOTE — PROGRESS NOTE ADULT - SUBJECTIVE AND OBJECTIVE BOX
MFM Consult     35yo  at 19w6d with complete previa and likely placenta accreta, who came in to triage last night after feeling a large gush of fluid. Patient states that she has been having increased discharge for the last few days and then she felt a large gush after coughing. She denies any abdominal pain, contractions or vaginal bleeding. She denies fevers or chills. Overall she feels very well.     OBHx: 2017 pLTCS due to hx of multiple uterine surgeries c/b focal accreta (performed at [facility], Op note not currently available for review) 5#15. Cervical shortening also noted in 3rd trimester   GynHx: Lsc myomectomy x2  (Dr. Mcbride, [place]), D&C/hysteroscopy with myomectomy x2  (Dr. Hsieh), D&C/hysteroscopy removal of Ashermann's  (Dr. Hsieh)   PMHx: hypothyroidism, ashtma  PSHx: as above  Med: synthroid 200mcg, , Claritin   All: NKDA  SH: neg x3    Gen: NAD  Pulm: nonlabored breathing   CV: RRR   Abd: soft, nontender    SSE: cervical os visualized, - pooling, small amount of discharge in vault   - ferning   TAUS: MVP 5.4, good fetal movement noted, +FH, complete anterior previa and concern for accreta with increased [place]     MRI: no concern for percreta at this time, suspicion for accreta

## 2021-12-11 NOTE — PROGRESS NOTE ADULT - ASSESSMENT
35y/o  at 19w6d with complete placenta previa and concern for placenta percreta with bladder invasion admitted with concern for previable  rupture of membranes.     #Concern for PPPROM  -Small pooling noted in posterior fornix, nitrazine positive, ferning negative. Amnisure sent and positive over night  -Exam repeated, unchanged from last night.    -Given highly risk pregnancy 2/2 to previa and concern for percreta and highly desired pregnancy, will monitor closely  -No signs/symptoms of chorioamnionitis at this time, abdomen soft and nontender, WBC 7 on admission   []Repeat CBC this morning    #Complete previa, concern for placenta percreta with bladder invasion  -Suspicion high given pt's history and AFP and bhcg 99%  -Last MFM scan , ultrasound findings suspicious for abnormal placentation, recommended repeat sono in 3 weeks  -No vaginal bleeding since arrival  -Given concern for PPPROM and high suspicion for abnormal placentation, will obtain MRI abdomen/pelvis to further assess placenta  -MFM aware of pt   []f/u MRI read    #Cough  -COVID PCR neg  -Tessalon pearls/Tylenol PRN    #Maternal Well Being  -NPO for possible OR, IVF  -Continue home synthroid  -SCDs for DVT ppx       - f/u MRI read  - Dr Handy to  pt this morning pending results.  Baldomero PGY3

## 2021-12-11 NOTE — PROGRESS NOTE ADULT - ATTENDING COMMENTS
MFM      37yo  at 19w6d with complete previa and likely placenta accreta, who came in to triage overnight after feeling a large gush of fluid. Patient now s/p multiple speculum exams. Some with concern for pooling and nitrazine positive, however no ferning on multiple exams and MVP remains stable >5cm.     MFM fellow note reviewed  Agree with A&P    Patient seen and evaluated by with the [facility] team and patient's OB Dr. Handy at bedside    Speculum exam at this time with no evidence of rupture and overall fluid is normal and unchanged from yesterday.   Bedside ultrasound by me shows normal AFV    Discussed with patient that at this time it is unclear whether she has ruptured as we have multiple conflicting exams, most recently without signs of rupture. Given patient with concern for accreta this makes the picture more complicated.     Discussed with patient that primarily it is important to ensure we are confident with the diagnosis. If she is ruptured, patient counseled on options including expected management, hysterectomy or D&E with possible hysterectomy if necessary. Explained that if diagnosed with previable PPROM and she opts for expected management it comes with the risk of infection, hemorrhage, premature delivery. Additionally explained to the patient that for fetal lung development the fetus needs to have adequate amniotic fluid and the risk of pulmonary hyoplasia discussed. Although we reviewed many possible options, at this time we are not clear on whether the patient is ruptured and therefore we are not able to provide adequate counseling. Patient verbalized understanding and agrees with plan for inpatient monitoring and re-evaluate in 24 hours.     - Recommend repeat sterile speculum exam  and JADA tomorrow     Yuval Talbert MD, MPH

## 2021-12-11 NOTE — PROGRESS NOTE ADULT - SUBJECTIVE AND OBJECTIVE BOX
R3 Antepartum Note, HD#2    Gestational AGE:     Interval events: Patient seen and examined at bedside, no acute overnight events. No acute complaints. Pt reports +FM, denies LOF, VB, ctx, HA, epigastric pain, blurred vision, CP, SOB, N/V, fevers, and chills.    Vital Signs Last 24 Hours  T(C): 36.6 (12-11-21 @ 06:43), Max: 37.2 (12-10-21 @ 18:26)  HR: 88 (12-11-21 @ 06:43) (78 - 111)  BP: 103/51 (12-11-21 @ 06:43) (103/51 - 124/63)  RR: 18 (12-11-21 @ 06:43) (18 - 19)  SpO2: 100% (12-11-21 @ 01:48) (89% - 100%)    CAPILLARY BLOOD GLUCOSE          Physical Exam:  General: NAD  Abdomen: Soft, non-tender, gravid  Ext: No pain or swelling    NST reactive overnight    Labs:             10.3   7.13  )-----------( 169      ( 12-10 @ 23:11 )             30.3                   MEDICATIONS  (STANDING):  acetaminophen     Tablet .. 975 milliGRAM(s) Oral once  folic acid 1 milliGRAM(s) Oral daily  levothyroxine 200 MICROGram(s) Oral daily  prenatal multivitamin 1 Tablet(s) Oral daily    MEDICATIONS  (PRN):  benzonatate 100 milliGRAM(s) Oral every 8 hours PRN Cough   R3 Antepartum Note, HD#2    Gestational AGE: 19+6    Interval events: Patient seen and examined at bedside, no acute overnight events. Feeling continued leaking overnight.   No acute complaints. No pain. Denies HA, epigastric pain, blurred vision, CP, SOB, N/V, fevers, and chills.    Vital Signs Last 24 Hours  T(C): 36.6 (12-11-21 @ 06:43), Max: 37.2 (12-10-21 @ 18:26)  HR: 88 (12-11-21 @ 06:43) (78 - 111)  BP: 103/51 (12-11-21 @ 06:43) (103/51 - 124/63)  RR: 18 (12-11-21 @ 06:43) (18 - 19)  SpO2: 100% (12-11-21 @ 01:48) (89% - 100%)    Physical Exam:  General: NAD  Abdomen: Soft, non-tender, gravid  Ext: No pain or swelling    SVE: copious yellow discharge at introitus, +pooling, + nitrazine, negative ferning. Exam repeated at request of attending.     Labs:             10.3   7.13  )-----------( 169      ( 12-10 @ 23:11 )             30.3                   MEDICATIONS  (STANDING):  acetaminophen     Tablet .. 975 milliGRAM(s) Oral once  folic acid 1 milliGRAM(s) Oral daily  levothyroxine 200 MICROGram(s) Oral daily  prenatal multivitamin 1 Tablet(s) Oral daily    MEDICATIONS  (PRN):  benzonatate 100 milliGRAM(s) Oral every 8 hours PRN Cough

## 2021-12-12 LAB
BASOPHILS # BLD AUTO: 0.02 K/UL — SIGNIFICANT CHANGE UP (ref 0–0.2)
BASOPHILS NFR BLD AUTO: 0.3 % — SIGNIFICANT CHANGE UP (ref 0–2)
EOSINOPHIL # BLD AUTO: 0.27 K/UL — SIGNIFICANT CHANGE UP (ref 0–0.5)
EOSINOPHIL NFR BLD AUTO: 4.1 % — SIGNIFICANT CHANGE UP (ref 0–6)
HCT VFR BLD CALC: 31.2 % — LOW (ref 34.5–45)
HGB BLD-MCNC: 10.3 G/DL — LOW (ref 11.5–15.5)
IMM GRANULOCYTES NFR BLD AUTO: 0.5 % — SIGNIFICANT CHANGE UP (ref 0–1.5)
LYMPHOCYTES # BLD AUTO: 1.84 K/UL — SIGNIFICANT CHANGE UP (ref 1–3.3)
LYMPHOCYTES # BLD AUTO: 27.7 % — SIGNIFICANT CHANGE UP (ref 13–44)
MCHC RBC-ENTMCNC: 29 PG — SIGNIFICANT CHANGE UP (ref 27–34)
MCHC RBC-ENTMCNC: 33 GM/DL — SIGNIFICANT CHANGE UP (ref 32–36)
MCV RBC AUTO: 87.9 FL — SIGNIFICANT CHANGE UP (ref 80–100)
MONOCYTES # BLD AUTO: 0.63 K/UL — SIGNIFICANT CHANGE UP (ref 0–0.9)
MONOCYTES NFR BLD AUTO: 9.5 % — SIGNIFICANT CHANGE UP (ref 2–14)
NEUTROPHILS # BLD AUTO: 3.85 K/UL — SIGNIFICANT CHANGE UP (ref 1.8–7.4)
NEUTROPHILS NFR BLD AUTO: 57.9 % — SIGNIFICANT CHANGE UP (ref 43–77)
NRBC # BLD: 0 /100 WBCS — SIGNIFICANT CHANGE UP (ref 0–0)
PLATELET # BLD AUTO: 173 K/UL — SIGNIFICANT CHANGE UP (ref 150–400)
RBC # BLD: 3.55 M/UL — LOW (ref 3.8–5.2)
RBC # FLD: 15.4 % — HIGH (ref 10.3–14.5)
WBC # BLD: 6.64 K/UL — SIGNIFICANT CHANGE UP (ref 3.8–10.5)
WBC # FLD AUTO: 6.64 K/UL — SIGNIFICANT CHANGE UP (ref 3.8–10.5)

## 2021-12-12 PROCEDURE — 99232 SBSQ HOSP IP/OBS MODERATE 35: CPT

## 2021-12-12 RX ORDER — METOCLOPRAMIDE HCL 10 MG
10 TABLET ORAL EVERY 6 HOURS
Refills: 0 | Status: DISCONTINUED | OUTPATIENT
Start: 2021-12-12 | End: 2021-12-13

## 2021-12-12 RX ORDER — DIPHENHYDRAMINE HCL 50 MG
25 CAPSULE ORAL EVERY 6 HOURS
Refills: 0 | Status: DISCONTINUED | OUTPATIENT
Start: 2021-12-12 | End: 2021-12-13

## 2021-12-12 RX ADMIN — Medication 100 MILLIGRAM(S): at 09:14

## 2021-12-12 RX ADMIN — Medication 200 MICROGRAM(S): at 05:56

## 2021-12-12 RX ADMIN — Medication 10 MILLIGRAM(S): at 18:11

## 2021-12-12 RX ADMIN — Medication 25 MILLIGRAM(S): at 10:08

## 2021-12-12 RX ADMIN — Medication 1 TABLET(S): at 11:56

## 2021-12-12 RX ADMIN — SODIUM CHLORIDE 3 MILLILITER(S): 9 INJECTION INTRAMUSCULAR; INTRAVENOUS; SUBCUTANEOUS at 17:13

## 2021-12-12 RX ADMIN — Medication 25 MILLIGRAM(S): at 18:12

## 2021-12-12 RX ADMIN — Medication 10 MILLIGRAM(S): at 10:08

## 2021-12-12 RX ADMIN — Medication 1 MILLIGRAM(S): at 11:56

## 2021-12-12 RX ADMIN — Medication 100 MILLIGRAM(S): at 18:12

## 2021-12-12 NOTE — CHART NOTE - NSCHARTNOTEFT_GEN_A_CORE
Pt seen with Dr. Handy     MVP 5.7 as previously noted yesterday seen again today  Pt denies leaking     Continue inpatient observation  Repeat SSE deferred per Dr. Ole Alexandre PGY3
Pt seen with M Dr. Talbert, [facility] Fellow Dr. Hirschberg and Attending Dr. Handy.     Pt re-examined at bedside. Speculum with discharge in vagina. Nitrazine positive. Ferning negative.     Pt counseled regarding findings and concerns for possible previable rupture versus vaginal discharge. MVP 5.5 on repeat sono.     Discussed options of expectant management versus interruption of pregnancy. Given unclear/equivocal exam for ROM, highly desired pregnancy and current stable clinical status (afebrile, no leukocytosis, clinically well appearing without pain or tenderness) - pt opts for expectant mgmt at this time.     Please see [facility] consult note for further details.     - Transfer ro 3KN  - Reg Diet  - SLIV  - AM CBC w/ Diff     Baldomero PGY3

## 2021-12-12 NOTE — PROGRESS NOTE ADULT - ATTENDING COMMENTS
PT seen and examined. Pt reports no leaking fluid since admission. -fevers, -chills, -abdominal pain, -cramping. MVP 5.7cm. For RADHA beth in am.

## 2021-12-12 NOTE — PROGRESS NOTE ADULT - SUBJECTIVE AND OBJECTIVE BOX
R3 Antepartum Note, HD#3    Gestational AGE: 20w    Interval events: Patient seen and examined at bedside, no acute overnight events.  No acute complaints. No pain. Denies HA, epigastric pain, blurred vision, CP, SOB, N/V, fevers, and chills.    Vital Signs Last 24 Hrs  T(C): 37.3 (12 Dec 2021 00:13), Max: 37.3 (12 Dec 2021 00:13)  T(F): 99.1 (12 Dec 2021 00:13), Max: 99.1 (12 Dec 2021 00:13)  HR: 91 (12 Dec 2021 00:13) (88 - 99)  BP: 97/62 (12 Dec 2021 00:13) (97/62 - 115/55)  BP(mean): --  RR: 18 (12 Dec 2021 00:13) (18 - 18)  SpO2: 95% (12 Dec 2021 00:13) (95% - 98%)    Physical Exam:  General: NAD  Abdomen: Soft, non-tender, gravid  Ext: No pain or swelling      Labs:             10.2   5.41  )-----------( 161      ( 12-11 @ 09:35 )             30.1                10.3   7.13  )-----------( 169      ( 12-10 @ 23:11 )             30.3             MEDICATIONS  (STANDING):  acetaminophen     Tablet .. 975 milliGRAM(s) Oral once  folic acid 1 milliGRAM(s) Oral daily  levothyroxine 200 MICROGram(s) Oral daily  prenatal multivitamin 1 Tablet(s) Oral daily    MEDICATIONS  (PRN):  benzonatate 100 milliGRAM(s) Oral every 8 hours PRN Cough

## 2021-12-12 NOTE — PROGRESS NOTE ADULT - ASSESSMENT
7yo  at 20wwith complete previa and likely placenta accreta, who came in to triage last night after feeling a large gush of fluid. Patient now s/p multiple speculum exams. Some with concern for pooling and nitrazine positive, however no ferning on multiple exams and MVP remains stable. Speculum exam at this time with no evidence of rupture and overall fluid is normal and unchanged. Discussed with patient that at this time it is unclear whether she has ruptured as we have multiple conflicting exams, most recently without signs of rupture. Given patient with concern for accreta this makes the picture more complicated. Will be reevaluated this AM with further plan pending results.     #Concern for PPPROM  -Small pooling noted in posterior fornix, nitrazine positive, ferning negative. Amnisure sent and positive over night    -Given highly risk pregnancy 2/2 to previa and concern for percreta and highly desired pregnancy, will monitor closely  -No signs/symptoms of chorioamnionitis at this time, abdomen soft and nontender, WBC 7 on admission   - Repeat exam this AM/TAuS  - f/u AM CBC w/ diff    #Complete previa, concern for placenta percreta with bladder invasion  -Suspicion high given pt's history and AFP and bhcg 99%  -Last MFM scan , ultrasound findings suspicious for abnormal placentation, recommended repeat sono in 3 weeks  -No vaginal bleeding since arrival  -Given concern for PPPROM and high suspicion for abnormal placentation, will obtain MRI abdomen/pelvis to further assess placenta  -MFM aware of pt   - MRI (): suspicion for placenta accreta, does not appear increta  - f/u TAuS today    #Cough  -COVID PCR neg  -Tessalon pearls/Tylenol PRN    #Maternal Well Being  -Reg diet, SLIV  -Continue home synthroid  -SCDs for DVT ppx     [place] PGY-3

## 2021-12-13 ENCOUNTER — ASOB RESULT (OUTPATIENT)
Age: 36
End: 2021-12-13

## 2021-12-13 ENCOUNTER — TRANSCRIPTION ENCOUNTER (OUTPATIENT)
Age: 36
End: 2021-12-13

## 2021-12-13 ENCOUNTER — NON-APPOINTMENT (OUTPATIENT)
Age: 36
End: 2021-12-13

## 2021-12-13 ENCOUNTER — APPOINTMENT (OUTPATIENT)
Dept: ANTEPARTUM | Facility: CLINIC | Age: 36
End: 2021-12-13

## 2021-12-13 VITALS
OXYGEN SATURATION: 97 % | RESPIRATION RATE: 18 BRPM | SYSTOLIC BLOOD PRESSURE: 109 MMHG | TEMPERATURE: 98 F | HEART RATE: 94 BPM | DIASTOLIC BLOOD PRESSURE: 70 MMHG

## 2021-12-13 LAB
BASOPHILS # BLD AUTO: 0.01 K/UL — SIGNIFICANT CHANGE UP (ref 0–0.2)
BASOPHILS NFR BLD AUTO: 0.2 % — SIGNIFICANT CHANGE UP (ref 0–2)
BLD GP AB SCN SERPL QL: NEGATIVE — SIGNIFICANT CHANGE UP
EOSINOPHIL # BLD AUTO: 0.24 K/UL — SIGNIFICANT CHANGE UP (ref 0–0.5)
EOSINOPHIL NFR BLD AUTO: 3.8 % — SIGNIFICANT CHANGE UP (ref 0–6)
HCT VFR BLD CALC: 31.6 % — LOW (ref 34.5–45)
HGB BLD-MCNC: 10.6 G/DL — LOW (ref 11.5–15.5)
IMM GRANULOCYTES NFR BLD AUTO: 0.3 % — SIGNIFICANT CHANGE UP (ref 0–1.5)
LYMPHOCYTES # BLD AUTO: 1.91 K/UL — SIGNIFICANT CHANGE UP (ref 1–3.3)
LYMPHOCYTES # BLD AUTO: 30 % — SIGNIFICANT CHANGE UP (ref 13–44)
MCHC RBC-ENTMCNC: 30 PG — SIGNIFICANT CHANGE UP (ref 27–34)
MCHC RBC-ENTMCNC: 33.5 GM/DL — SIGNIFICANT CHANGE UP (ref 32–36)
MCV RBC AUTO: 89.5 FL — SIGNIFICANT CHANGE UP (ref 80–100)
MONOCYTES # BLD AUTO: 0.44 K/UL — SIGNIFICANT CHANGE UP (ref 0–0.9)
MONOCYTES NFR BLD AUTO: 6.9 % — SIGNIFICANT CHANGE UP (ref 2–14)
NEUTROPHILS # BLD AUTO: 3.74 K/UL — SIGNIFICANT CHANGE UP (ref 1.8–7.4)
NEUTROPHILS NFR BLD AUTO: 58.8 % — SIGNIFICANT CHANGE UP (ref 43–77)
NRBC # BLD: 0 /100 WBCS — SIGNIFICANT CHANGE UP (ref 0–0)
PLATELET # BLD AUTO: 165 K/UL — SIGNIFICANT CHANGE UP (ref 150–400)
RBC # BLD: 3.53 M/UL — LOW (ref 3.8–5.2)
RBC # FLD: 15.4 % — HIGH (ref 10.3–14.5)
RH IG SCN BLD-IMP: POSITIVE — SIGNIFICANT CHANGE UP
WBC # BLD: 6.36 K/UL — SIGNIFICANT CHANGE UP (ref 3.8–10.5)
WBC # FLD AUTO: 6.36 K/UL — SIGNIFICANT CHANGE UP (ref 3.8–10.5)

## 2021-12-13 PROCEDURE — 59025 FETAL NON-STRESS TEST: CPT

## 2021-12-13 PROCEDURE — G0463: CPT

## 2021-12-13 PROCEDURE — 85025 COMPLETE CBC W/AUTO DIFF WBC: CPT

## 2021-12-13 PROCEDURE — 84112 EVAL AMNIOTIC FLUID PROTEIN: CPT

## 2021-12-13 PROCEDURE — 76816 OB US FOLLOW-UP PER FETUS: CPT | Mod: 26

## 2021-12-13 PROCEDURE — 86769 SARS-COV-2 COVID-19 ANTIBODY: CPT

## 2021-12-13 PROCEDURE — G1004: CPT

## 2021-12-13 PROCEDURE — 76816 OB US FOLLOW-UP PER FETUS: CPT

## 2021-12-13 PROCEDURE — 74181 MRI ABDOMEN W/O CONTRAST: CPT | Mod: MG

## 2021-12-13 PROCEDURE — 86900 BLOOD TYPING SEROLOGIC ABO: CPT

## 2021-12-13 PROCEDURE — 86850 RBC ANTIBODY SCREEN: CPT

## 2021-12-13 PROCEDURE — 99238 HOSP IP/OBS DSCHRG MGMT 30/<: CPT

## 2021-12-13 PROCEDURE — 72195 MRI PELVIS W/O DYE: CPT | Mod: MG

## 2021-12-13 PROCEDURE — 85730 THROMBOPLASTIN TIME PARTIAL: CPT

## 2021-12-13 PROCEDURE — 86901 BLOOD TYPING SEROLOGIC RH(D): CPT

## 2021-12-13 PROCEDURE — 85610 PROTHROMBIN TIME: CPT

## 2021-12-13 PROCEDURE — 87635 SARS-COV-2 COVID-19 AMP PRB: CPT

## 2021-12-13 PROCEDURE — 94640 AIRWAY INHALATION TREATMENT: CPT

## 2021-12-13 PROCEDURE — 36415 COLL VENOUS BLD VENIPUNCTURE: CPT

## 2021-12-13 RX ORDER — IBUPROFEN 200 MG
3 TABLET ORAL
Qty: 0 | Refills: 0 | DISCHARGE

## 2021-12-13 RX ORDER — ACETAMINOPHEN 500 MG
2 TABLET ORAL
Qty: 0 | Refills: 0 | DISCHARGE

## 2021-12-13 RX ADMIN — Medication 200 MICROGRAM(S): at 06:05

## 2021-12-13 RX ADMIN — SODIUM CHLORIDE 3 MILLILITER(S): 9 INJECTION INTRAMUSCULAR; INTRAVENOUS; SUBCUTANEOUS at 06:05

## 2021-12-13 RX ADMIN — Medication 1 TABLET(S): at 12:08

## 2021-12-13 RX ADMIN — Medication 25 MILLIGRAM(S): at 12:09

## 2021-12-13 RX ADMIN — Medication 10 MILLIGRAM(S): at 12:09

## 2021-12-13 RX ADMIN — Medication 100 MILLIGRAM(S): at 05:52

## 2021-12-13 RX ADMIN — Medication 1 MILLIGRAM(S): at 12:08

## 2021-12-13 NOTE — DISCHARGE NOTE ANTEPARTUM - CARE PROVIDER_API CALL
Rick Urena)  MaternalFetal Medicine; Medical Genetics; Obstetrics and Gynecology  [address] [address]  Phone: [phone]  Fax: [phone]  Follow Up Time:

## 2021-12-13 NOTE — DISCHARGE NOTE ANTEPARTUM - CARE PLAN
Principal Discharge DX:	Placenta accreta  Assessment and plan of treatment:	You were found to have stable fluid on ultrasound, and noted to have likely accreta based on MRI imaging.     Please follow up with your OB doctor this week.  Return to L&D if you experience contractions every 3-5 minutes, leaking of fluid, vaginal bleeding like a period, or less than 5 fetal movements per hour.   1

## 2021-12-13 NOTE — PROGRESS NOTE ADULT - SUBJECTIVE AND OBJECTIVE BOX
R3 Antepartum Note, HD#4    Interval events: SSE deferred yesterday, MVP continues to be 5.7    Patient seen and examined at bedside, no acute overnight events. No acute complaints. Pt denies LOF, VB, ctx, fevers, and chills.    Vital Signs Last 24 Hours  T(C): 36.7 (12-13-21 @ 06:05), Max: 37.1 (12-12-21 @ 09:00)  HR: 90 (12-13-21 @ 06:05) (88 - 94)  BP: 85/54 (12-13-21 @ 06:05) (85/54 - 113/70)  RR: 18 (12-13-21 @ 06:05) (18 - 18)  SpO2: 98% (12-13-21 @ 06:05) (96% - 98%)      Physical Exam:  General: NAD  Abdomen: Soft, non-tender  : no LOF noted  Ext: No pain or swelling      Labs:             10.6   6.36  )-----------( 165      ( 12-13 @ 06:27 )             31.6           MEDICATIONS  (STANDING):  acetaminophen     Tablet .. 975 milliGRAM(s) Oral once  folic acid 1 milliGRAM(s) Oral daily  indigotindisulfonate Injectable 10 milliGRAM(s) IV Push once  levothyroxine 200 MICROGram(s) Oral daily  prenatal multivitamin 1 Tablet(s) Oral daily  sodium chloride 0.9% lock flush 3 milliLiter(s) IV Push every 8 hours    MEDICATIONS  (PRN):  ALBUTerol    90 MICROgram(s) HFA Inhaler 1 Puff(s) Inhalation two times a day PRN Shortness of Breath and/or Wheezing  benzonatate 100 milliGRAM(s) Oral every 8 hours PRN Cough  diphenhydrAMINE 25 milliGRAM(s) Oral every 6 hours PRN headache  metoclopramide 10 milliGRAM(s) Oral every 6 hours PRN nausea/headache

## 2021-12-13 NOTE — DISCHARGE NOTE ANTEPARTUM - MEDICATION SUMMARY - MEDICATIONS TO TAKE
I will START or STAY ON the medications listed below when I get home from the hospital:    rizatriptan 5 mg oral tablet  -- 1 tab(s) by mouth once a day, As Needed  -- Indication: For home med    ondansetron 4 mg oral tablet, disintegrating  -- 1 tab(s) by mouth every 6 hours, As Needed -for nausea   -- Indication: For home med     doxycycline hyclate 100 mg oral capsule  -- 1 cap(s) by mouth 2 times a day   -- Avoid prolonged or excessive exposure to direct and/or artificial sunlight while taking this medication.  Do not take this drug if you are pregnant.  Finish all this medication unless otherwise directed by prescriber.  Medication should be taken with plenty of water.    -- Indication: For STOP THIS    ProAir HFA 90 mcg/inh inhalation aerosol  -- 2 puff(s) inhaled every 6 hours, As Needed  -- Indication: For home med    hyoscyamine 0.375 mg dual-release oral tablet, extended release  -- 1 tab(s) by mouth every 12 hours  -- Indication: For home med    Synthroid 175 mcg (0.175 mg) oral tablet  -- 1 tab(s) by mouth once a day  -- Indication: For home med    Vitamin D3 2000 intl units (50 mcg) oral tablet  -- 2 tab(s) by mouth once a day  -- Indication: For home med

## 2021-12-13 NOTE — DISCHARGE NOTE ANTEPARTUM - PATIENT PORTAL LINK FT
You can access the FollowMyHealth Patient Portal offered by [facility] by registering at the following website: [email or web address]. By joining [facility]’s FollowMyHealth portal, you will also be able to view your health information using other applications (apps) compatible with our system.

## 2021-12-13 NOTE — DISCHARGE NOTE ANTEPARTUM - PLAN OF CARE
You were found to have stable fluid on ultrasound, and noted to have likely accreta based on MRI imaging.     Please follow up with your OB doctor this week.  Return to L&D if you experience contractions every 3-5 minutes, leaking of fluid, vaginal bleeding like a period, or less than 5 fetal movements per hour.

## 2021-12-13 NOTE — DISCHARGE NOTE ANTEPARTUM - NS MD DC FALL RISK RISK
For information on Fall & Injury Prevention, visit: [email or web address] OR  [email or web address] OR  https://www.cdc.gov/steadi/patient.html

## 2021-12-13 NOTE — DISCHARGE NOTE ANTEPARTUM - HOSPITAL COURSE
37yo  @ 20w1d complete previa and likely placenta accreta, who came in to triage on Friday evening, and has been ruled out for premature rupture of membranes. Fluid noted to be stable x 3 days, normal physiologic discharge has been noted. Scanned by High Risk , confirmed MVP continues to be >5. Additionally, MRI performed for better characterization of placenta:    < from: MR Pelvis No Cont (21 @ 02:47) >  Please note that abnormal placentation is better evaluated on MRI between   24 and 30 weeks of gestation.    Complete placenta previa.    Along the dome of the bladder and anterior to the internal cervical os,   T2 dark bands are present within the placenta with questionable adjacent   discontinuity of the adjacent inner myometrial layer, raising suspicion   for abdominal placentation.    No evidence of bulging of the uterine contour or focal tenting of the   adjacent bladder. Overall findings suggest placenta accreta.  < end of copied text >    Please follow up with M for follow up scans and with Dr. Urena on Thursday.

## 2021-12-13 NOTE — DISCHARGE NOTE ANTEPARTUM - MEDICATION SUMMARY - MEDICATIONS TO STOP TAKING
I will STOP taking the medications listed below when I get home from the hospital:    ibuprofen 200 mg oral tablet  -- 3 tab(s) by mouth every 6 hours    acetaminophen 500 mg oral tablet  -- 2 tab(s) by mouth every 6 hours    doxycycline hyclate 100 mg oral capsule  -- 1 cap(s) by mouth 2 times a day   -- Avoid prolonged or excessive exposure to direct and/or artificial sunlight while taking this medication.  Do not take this drug if you are pregnant.  Finish all this medication unless otherwise directed by prescriber.  Medication should be taken with plenty of water.    estradiol 1 mg oral tablet  -- 2 tab(s) by mouth once a day; if pills cause significant nausea, can instead take as 1 tab by mouth two times a day  -- Do not take this drug if you are pregnant.  It is very important that you take or use this exactly as directed.  Do not skip doses or discontinue unless directed by your doctor.

## 2021-12-13 NOTE — PROGRESS NOTE ADULT - ASSESSMENT
35yo  @ 20w1d complete previa and likely placenta accreta, who came in to triage last night after feeling a large gush of fluid. Patient now s/p multiple speculum exams. Some with concern for pooling and nitrazine positive, however no ferning on multiple exams and MVP remains stable. Speculum exam at this time with no evidence of rupture and overall fluid is normal and unchanged. Discussed with patient that at this time it is unclear whether she has ruptured as we have multiple conflicting exams, most recently without signs of rupture. Given patient with concern for accreta this makes the picture more complicated. MVP noted to be stable again  with SSE deferred. Will be reevaluated this AM with further plan pending results.     #Concern for PPPROM  -Small pooling noted in posterior fornix, nitrazine positive, ferning negative. Amnisure sent and positive over night    -Given highly risk pregnancy 2/2 to previa and concern for percreta and highly desired pregnancy, will monitor closely  -No signs/symptoms of chorioamnionitis at this time, abdomen soft and nontender, WBC 7 on admission   - SSE/TAUS this AM  - f/u AM CBC w/ diff    #Complete previa, concern for placenta percreta with bladder invasion  -Suspicion high given pt's history and AFP and bhcg 99%  -Last MFM scan , ultrasound findings suspicious for abnormal placentation, recommended repeat sono in 3 weeks  -No vaginal bleeding since arrival  -Given concern for PPPROM and high suspicion for abnormal placentation, will obtain MRI abdomen/pelvis to further assess placenta  -MFM aware of pt   - MRI (): suspicion for placenta accreta, does not appear increta  - f/u TAUS today    #Cough  -COVID PCR neg  -Tessalon pearls/Tylenol PRN    #Maternal Well Being  -Reg diet, SLIV  -Continue home synthroid  -SCDs for DVT ppx     [place] PGY-3

## 2021-12-15 ENCOUNTER — NON-APPOINTMENT (OUTPATIENT)
Age: 36
End: 2021-12-15

## 2021-12-16 ENCOUNTER — NON-APPOINTMENT (OUTPATIENT)
Age: 36
End: 2021-12-16

## 2021-12-16 ENCOUNTER — APPOINTMENT (OUTPATIENT)
Dept: ANTEPARTUM | Facility: CLINIC | Age: 36
End: 2021-12-16

## 2021-12-16 ENCOUNTER — APPOINTMENT (OUTPATIENT)
Dept: OBGYN | Facility: CLINIC | Age: 36
End: 2021-12-16
Payer: COMMERCIAL

## 2021-12-16 PROCEDURE — 0502F SUBSEQUENT PRENATAL CARE: CPT

## 2021-12-22 ENCOUNTER — OUTPATIENT (OUTPATIENT)
Dept: OUTPATIENT SERVICES | Age: 36
LOS: 1 days | Discharge: HOME | End: 2021-12-22

## 2021-12-22 DIAGNOSIS — Z98.89 OTHER SPECIFIED POSTPROCEDURAL STATES: Chronic | ICD-10-CM

## 2021-12-22 DIAGNOSIS — Z52.811 EGG (OOCYTE) DONOR UNDER AGE 35, DESIGNATED RECIPIENT: Chronic | ICD-10-CM

## 2021-12-22 DIAGNOSIS — Z98.891 HISTORY OF UTERINE SCAR FROM PREVIOUS SURGERY: Chronic | ICD-10-CM

## 2021-12-22 DIAGNOSIS — Z98.890 OTHER SPECIFIED POSTPROCEDURAL STATES: Chronic | ICD-10-CM

## 2021-12-22 DIAGNOSIS — Z90.89 ACQUIRED ABSENCE OF OTHER ORGANS: Chronic | ICD-10-CM

## 2021-12-23 ENCOUNTER — APPOINTMENT (OUTPATIENT)
Dept: PEDIATRIC CARDIOLOGY | Facility: CLINIC | Age: 36
End: 2021-12-23
Payer: COMMERCIAL

## 2021-12-23 PROCEDURE — 93325 DOPPLER ECHO COLOR FLOW MAPG: CPT

## 2021-12-23 PROCEDURE — 76825 ECHO EXAM OF FETAL HEART: CPT

## 2021-12-23 PROCEDURE — 76827 ECHO EXAM OF FETAL HEART: CPT

## 2021-12-30 ENCOUNTER — NON-APPOINTMENT (OUTPATIENT)
Age: 36
End: 2021-12-30

## 2021-12-30 ENCOUNTER — APPOINTMENT (OUTPATIENT)
Dept: OBGYN | Facility: CLINIC | Age: 36
End: 2021-12-30
Payer: COMMERCIAL

## 2021-12-30 ENCOUNTER — APPOINTMENT (OUTPATIENT)
Dept: PULMONOLOGY | Facility: CLINIC | Age: 36
End: 2021-12-30
Payer: COMMERCIAL

## 2021-12-30 VITALS
HEIGHT: 64 IN | HEART RATE: 112 BPM | BODY MASS INDEX: 34.83 KG/M2 | SYSTOLIC BLOOD PRESSURE: 103 MMHG | DIASTOLIC BLOOD PRESSURE: 68 MMHG | WEIGHT: 204 LBS

## 2021-12-30 VITALS
OXYGEN SATURATION: 98 % | WEIGHT: 203 LBS | RESPIRATION RATE: 17 BRPM | HEIGHT: 64 IN | HEART RATE: 111 BPM | BODY MASS INDEX: 34.66 KG/M2 | SYSTOLIC BLOOD PRESSURE: 120 MMHG | TEMPERATURE: 97.1 F | DIASTOLIC BLOOD PRESSURE: 60 MMHG

## 2021-12-30 DIAGNOSIS — R06.83 SNORING: ICD-10-CM

## 2021-12-30 DIAGNOSIS — R07.89 OTHER CHEST PAIN: ICD-10-CM

## 2021-12-30 DIAGNOSIS — Z83.49 FAMILY HISTORY OF OTHER ENDOCRINE, NUTRITIONAL AND METABOLIC DISEASES: ICD-10-CM

## 2021-12-30 DIAGNOSIS — J45.909 UNSPECIFIED ASTHMA, UNCOMPLICATED: ICD-10-CM

## 2021-12-30 PROCEDURE — 0502F SUBSEQUENT PRENATAL CARE: CPT

## 2021-12-30 PROCEDURE — 99204 OFFICE O/P NEW MOD 45 MIN: CPT | Mod: 25

## 2021-12-30 PROCEDURE — 94010 BREATHING CAPACITY TEST: CPT

## 2021-12-30 PROCEDURE — 94618 PULMONARY STRESS TESTING: CPT

## 2021-12-30 RX ORDER — ALBUTEROL SULFATE 2.5 MG/3ML
(2.5 MG/3ML) SOLUTION RESPIRATORY (INHALATION)
Qty: 120 | Refills: 3 | Status: ACTIVE | COMMUNITY
Start: 2021-12-30 | End: 1900-01-01

## 2021-12-30 RX ORDER — DOXYCLYCLINE HYCLATE 150 MG/1
TABLET, COATED ORAL
Refills: 0 | Status: DISCONTINUED | COMMUNITY
End: 2021-12-30

## 2021-12-30 RX ORDER — BUDESONIDE 32 UG/1
32 SPRAY, METERED NASAL
Qty: 1 | Refills: 3 | Status: ACTIVE | COMMUNITY
Start: 2021-12-30 | End: 1900-01-01

## 2021-12-30 NOTE — ASSESSMENT
[FreeTextEntry1] : Ms. CHILDS is a 36 year old female with a history of  headaches, endometriosis, migraines, thyroid, anxiety, depression, ovarian cyst, asthma, 22+ weeks pregnant with 2nd child, presenting to the office today for initial pulmonary evaluation. Her chief complaint is active asthma/ left sided pain/ allergies/ gerd/ likely OSAS\par \par Her shortness of breath is multifactorial due to:\par -poor mechanics of breathing \par -out of shape / overweight\par -pulmonary disease\par    -Full PFTs to follow\par -cardiac disease \par \par Problem 1: Active Asthma \par -Add Symbicort 160 2 BID \par - add Albuterol 0.83% via nebulizer, Q6H \par Asthma is  believed to be caused by inherited (genetic) and environmental factor, but its exact cause is unknown. Asthma may be triggered by allergens, lung infections, or irritants in the air. Asthma triggers are different for each person \par Inhaler technique reviewed as well as oral hygiene techniques reviewed with patient. Avoidance of cold air, extremes of temperature, rescue inhaler should be used before exercise. Order of medication reviewed with patient. Recommended use of a cool mist humidifier in the bedroom. \par \par \par Problem 2: Allergies \par -get Blood work to include: asthma panel, food IgE panel, IgE level, eosinophil level, vitamin D level \par -Recommend Rhinocort \par Environmental measures for allergies were encouraged including mattress and pillow covers, air purifier, and environmental controls. \par \par \par Problem 3: GERD \par -Add Pepcid 40mg QHS \par -Rule of 2s: avoid eating too much, eating too late, eating too spicy, eating two hours before bed.\par -Things to avoid including overeating, spicy foods, tight clothing, eating within three hours of bed, this list is not all inclusive. \par -For treatment of reflux, possible options discussed including diet control, H2 blockers, PPIs, as well as coating motility agents discussed as treatment options. Timing of meals and proximity of last meal to sleep were discussed. If symptoms persist, a formal gastrointestinal evaluation is needed. \par \par \par Problem 4: Likely OSAS (Elevated mallampati class, snoring, reflux) \par -Complete home sleep study \par Sleep apnea is associated with adverse clinical consequences which can affect most organ systems.  Cardiovascular disease risk includes arrhythmias, atrial fibrillation, hypertension, coronary artery disease, and stroke. Metabolic disorders include diabetes type 2, non-alcoholic fatty liver disease. Mood disorder especially depression; and cognitive decline especially in the elderly. Associations with  chronic reflux/Abel’s esophagus some but not all inclusive. \par -Reasons  include arousal consistent with hypopnea; respiratory events most prominent in REM sleep or supine position; therefore sleep staging and body position are important for accurate diagnosis and estimation of AHI. \par \par \par Problem 5:  Musculoskeletal chest pain \par -Reproducible chest pains \par -Recommend bracing self when coughing \par -Recommended Topricin \par \par \par problem : cardiac disease\par -recommended to continue to follow up with Cardiologist \par \par problem : poor breathing mechanics\par -Recommended Wim Hof and Buteyko breathing techniques \par -Proper breathing techniques were reviewed with an emphasis of exhalation. Patient instructed to breath in for 1 second and out for four seconds. Patient was encouraged to not talk while walking. \par \par problem : out of shape / overweight\par -Recommended Muniq "OTC" Supplement for controlling blood sugar levels, weight loss, and energy \par -Weight loss, exercise, and diet control were discussed and are highly encouraged. Treatment options were given such as, aqua therapy, and contacting a nutritionist. Recommended to use the elliptical, stationary bike, less use of treadmill. Mindful eating was explained to the patient Obesity is associated with worsening asthma, shortness of breath, and potential for cardiac disease, diabetes, and other underlying medical conditions\par \par problem : health maintenance \par -recommended yearly flu shot after October 15\par -recommended strep pneumonia vaccines: Prevnar-13 vaccine, followed by Pneumo vaccine 23 one year following after 65\par -recommended early intervention for Upper Respiratory Infections (URIs)\par -recommended regular osteoporosis evaluations\par -recommended early dermatological evaluations\par -recommended after the age of 50 to the age of 70, colonoscopy every 5 years\par \par F/U in 6-8 weeks.\par She is encouraged to call with any changes, concerns, or questions

## 2021-12-30 NOTE — REASON FOR VISIT
[Initial] : an initial visit [TextBox_44] : active asthma/ left sided pain/ allergies/ gerd/ likely OSAS

## 2021-12-30 NOTE — HISTORY OF PRESENT ILLNESS
[TextBox_4] : Ms. CHILDS is a 36 year old female with a history of  headaches, endometriosis, migraines, thyroid, anxiety, depression, ovarian cyst, asthma, 22+ weeks pregnant with 2nd child, presenting to the office today for initial pulmonary evaluation. Her chief complaint is active asthma/ left sided pain/ allergies/ gerd/ likely OSAS\par -she notes having asthma since she was a kid\par -she notes notes having much asthma issues until her pregnancy \par -she notes coughing and wheezing \par -she notes her triggers for asthma are random things \par -cold air bothers her, aerosols, exercise \par -she notes sx are coughing wheezing, \par -she notes having a lot of allergies \par -she notes congestion, itchy ears, itchy throat, eczema \par -she notes having awful gerd and reflux \par -she notes having a feeling of a lump in her throat that needs to be cleared \par -she notes snoring a lot \par -she notes not feeling rested when she sleeps \par -she notes memory is good\par -she notes concentration varies \par -she notes sleeping in a TV show that’s boring \par -she notes being able to sleep in a car as a passenger \par -she was unable to breast feed in her first pregnancy \par -she notes x3 vaccine \par -she notes having a constant feeling to cough \par -she notes having her daughter had a URI and gave her the illness \par -she was hospitalized for 4 days because they thought her water broke \par -she notes her barking cough wouldn't go away \par -she notes sometimes taking her inhaler (albuterol) \par -she notes taking tums, pre fili, pepcid 20 mg, baby aspirin, and claritin \par -she denies any headaches, nausea, vomiting, fever, chills, sweats, chest pain, chest pressure, diarrhea, constipation, dysphagia, dizziness, leg swelling, leg pain sour taste in the mouth, myalgias or arthralgias.

## 2021-12-30 NOTE — PROCEDURE
[FreeTextEntry1] : \par blood work shows wbc 6.36 hgb 10.6 hct 31.6 plt 1.65 eosinophils 240 3.8%  \par \par PFT - spi reveals normal flows; FEV1 is 2.78 which is 88% of predicted, normal flow volume loop; flattened inspiroatory limb \par \par 6 minute walk test reveals a low saturation of 98% with sliht evidence of dyspnea or fatigue; walked 489  meters

## 2021-12-30 NOTE — PHYSICAL EXAM
[No Acute Distress] : no acute distress [Normal Oropharynx] : normal oropharynx [III] : Mallampati Class: III [Normal Appearance] : normal appearance [No Neck Mass] : no neck mass [Normal Rate/Rhythm] : normal rate/rhythm [Normal S1, S2] : normal s1, s2 [No Murmurs] : no murmurs [No Resp Distress] : no resp distress [Clear to Auscultation Bilaterally] : clear to auscultation bilaterally [No Abnormalities] : no abnormalities [Benign] : benign [Normal Gait] : normal gait [No Clubbing] : no clubbing [No Cyanosis] : no cyanosis [No Edema] : no edema [FROM] : FROM [Normal Color/ Pigmentation] : normal color/ pigmentation [No Focal Deficits] : no focal deficits [Oriented x3] : oriented x3 [Normal Affect] : normal affect [TextBox_2] : ow; pregnant  [TextBox_89] : reproducible sub-condral pain

## 2021-12-30 NOTE — ADDENDUM
[FreeTextEntry1] : Documented by Radha Delgado acting as a scribe for Dr. Dale Brunson on 12/30/2021 .\par \par All medical record entries made by the Scribe were at my, Dr. Dale Brunson's, direction and personally dictated by me on. I have reviewed the chart and agree that the record accurately reflects my personal performance of the history, physical exam, assessment and plan. I have also personally directed, reviewed, and agree with the discharge instructions.

## 2022-01-03 RX ORDER — FAMOTIDINE 40 MG/1
40 TABLET, FILM COATED ORAL
Qty: 90 | Refills: 1 | Status: ACTIVE | COMMUNITY
Start: 2021-12-30 | End: 1900-01-01

## 2022-01-03 RX ORDER — BUDESONIDE AND FORMOTEROL FUMARATE DIHYDRATE 160; 4.5 UG/1; UG/1
160-4.5 AEROSOL RESPIRATORY (INHALATION) TWICE DAILY
Qty: 3 | Refills: 1 | Status: ACTIVE | COMMUNITY
Start: 2021-12-30 | End: 1900-01-01

## 2022-01-13 ENCOUNTER — TRANSCRIPTION ENCOUNTER (OUTPATIENT)
Age: 37
End: 2022-01-13

## 2022-01-13 RX ORDER — INHALER, ASSIST DEVICES
SPACER (EA) MISCELLANEOUS
Qty: 1 | Refills: 0 | Status: ACTIVE | COMMUNITY
Start: 2022-01-13 | End: 1900-01-01

## 2022-01-17 ENCOUNTER — LABORATORY RESULT (OUTPATIENT)
Age: 37
End: 2022-01-17

## 2022-01-17 LAB
25(OH)D3 SERPL-MCNC: 63.8 NG/ML
BASOPHILS # BLD AUTO: 0.03 K/UL
BASOPHILS NFR BLD AUTO: 0.3 %
EOSINOPHIL # BLD AUTO: 0.17 K/UL
EOSINOPHIL NFR BLD AUTO: 1.9 %
HCT VFR BLD CALC: 36.7 %
HGB BLD-MCNC: 11.5 G/DL
IMM GRANULOCYTES NFR BLD AUTO: 0.6 %
LYMPHOCYTES # BLD AUTO: 1.87 K/UL
LYMPHOCYTES NFR BLD AUTO: 21 %
MAN DIFF?: NORMAL
MCHC RBC-ENTMCNC: 30.4 PG
MCHC RBC-ENTMCNC: 31.3 GM/DL
MCV RBC AUTO: 97.1 FL
MONOCYTES # BLD AUTO: 0.64 K/UL
MONOCYTES NFR BLD AUTO: 7.2 %
NEUTROPHILS # BLD AUTO: 6.15 K/UL
NEUTROPHILS NFR BLD AUTO: 69 %
PLATELET # BLD AUTO: 193 K/UL
RBC # BLD: 3.78 M/UL
RBC # FLD: 15.6 %
WBC # FLD AUTO: 8.91 K/UL

## 2022-01-18 ENCOUNTER — NON-APPOINTMENT (OUTPATIENT)
Age: 37
End: 2022-01-18

## 2022-01-18 ENCOUNTER — TRANSCRIPTION ENCOUNTER (OUTPATIENT)
Age: 37
End: 2022-01-18

## 2022-01-18 LAB — 24R-OH-CALCIDIOL SERPL-MCNC: 87.3 PG/ML

## 2022-01-19 LAB
A ALTERNATA IGE QN: <0.1 KUA/L
A ALTERNATA IGE QN: <0.1 KUA/L
A FUMIGATUS IGE QN: <0.1 KUA/L
A FUMIGATUS IGE QN: <0.1 KUA/L
BERMUDA GRASS IGE QN: <0.1 KUA/L
BOXELDER IGE QN: <0.1 KUA/L
C ALBICANS IGE QN: <0.1 KUA/L
C HERBARUM IGE QN: <0.1 KUA/L
C HERBARUM IGE QN: <0.1 KUA/L
CALIF WALNUT IGE QN: <0.1 KUA/L
CAT DANDER IGE QN: <0.1 KUA/L
CAT DANDER IGE QN: <0.1 KUA/L
CLAM IGE QN: <0.1 KUA/L
CMN PIGWEED IGE QN: <0.1 KUA/L
CODFISH IGE QN: <0.1 KUA/L
COMMON RAGWEED IGE QN: <0.1 KUA/L
COMMON RAGWEED IGE QN: <0.1 KUA/L
CORN IGE QN: <0.1 KUA/L
COTTONWOOD IGE QN: <0.1 KUA/L
COW MILK IGE QN: <0.1 KUA/L
D FARINAE IGE QN: <0.1 KUA/L
D FARINAE IGE QN: <0.1 KUA/L
D PTERONYSS IGE QN: <0.1 KUA/L
D PTERONYSS IGE QN: <0.1 KUA/L
DEPRECATED A ALTERNATA IGE RAST QL: 0
DEPRECATED A ALTERNATA IGE RAST QL: 0
DEPRECATED A FUMIGATUS IGE RAST QL: 0
DEPRECATED A FUMIGATUS IGE RAST QL: 0
DEPRECATED BERMUDA GRASS IGE RAST QL: 0
DEPRECATED BOXELDER IGE RAST QL: 0
DEPRECATED C ALBICANS IGE RAST QL: 0
DEPRECATED C HERBARUM IGE RAST QL: 0
DEPRECATED C HERBARUM IGE RAST QL: 0
DEPRECATED CAT DANDER IGE RAST QL: 0
DEPRECATED CAT DANDER IGE RAST QL: 0
DEPRECATED CLAM IGE RAST QL: 0
DEPRECATED CODFISH IGE RAST QL: 0
DEPRECATED COMMON PIGWEED IGE RAST QL: 0
DEPRECATED COMMON RAGWEED IGE RAST QL: 0
DEPRECATED COMMON RAGWEED IGE RAST QL: 0
DEPRECATED CORN IGE RAST QL: 0
DEPRECATED COTTONWOOD IGE RAST QL: 0
DEPRECATED COW MILK IGE RAST QL: 0
DEPRECATED D FARINAE IGE RAST QL: 0
DEPRECATED D FARINAE IGE RAST QL: 0
DEPRECATED D PTERONYSS IGE RAST QL: 0
DEPRECATED D PTERONYSS IGE RAST QL: 0
DEPRECATED DOG DANDER IGE RAST QL: 0
DEPRECATED DOG DANDER IGE RAST QL: 0
DEPRECATED DUCK FEATHER IGE RAST QL: 0
DEPRECATED EGG WHITE IGE RAST QL: 0
DEPRECATED GOOSE FEATHER IGE RAST QL: 0
DEPRECATED GOOSEFOOT IGE RAST QL: 0
DEPRECATED LONDON PLANE IGE RAST QL: 0
DEPRECATED M RACEMOSUS IGE RAST QL: 0
DEPRECATED MOUSE URINE PROT IGE RAST QL: 0
DEPRECATED MUGWORT IGE RAST QL: 0
DEPRECATED P NOTATUM IGE RAST QL: 0
DEPRECATED PEANUT IGE RAST QL: 0
DEPRECATED RED CEDAR IGE RAST QL: 0
DEPRECATED ROACH IGE RAST QL: 0
DEPRECATED ROACH IGE RAST QL: 0
DEPRECATED SCALLOP IGE RAST QL: <0.1 KUA/L
DEPRECATED SESAME SEED IGE RAST QL: 0
DEPRECATED SHEEP SORREL IGE RAST QL: 0
DEPRECATED SHRIMP IGE RAST QL: 0
DEPRECATED SILVER BIRCH IGE RAST QL: 0
DEPRECATED SOYBEAN IGE RAST QL: 0
DEPRECATED TIMOTHY IGE RAST QL: 0
DEPRECATED TIMOTHY IGE RAST QL: 0
DEPRECATED WALNUT IGE RAST QL: 0
DEPRECATED WHEAT IGE RAST QL: 0
DEPRECATED WHITE ASH IGE RAST QL: 0
DEPRECATED WHITE OAK IGE RAST QL: 0
DEPRECATED WHITE OAK IGE RAST QL: 0
DOG DANDER IGE QN: <0.1 KUA/L
DOG DANDER IGE QN: <0.1 KUA/L
DUCK FEATHER IGE QN: <0.1 KUA/L
EGG WHITE IGE QN: <0.1 KUA/L
GOOSE FEATHER IGE QN: <0.1 KUA/L
GOOSEFOOT IGE QN: <0.1 KUA/L
LONDON PLANE IGE QN: <0.1 KUA/L
M RACEMOSUS IGE QN: <0.1 KUA/L
MOUSE URINE PROT IGE QN: <0.1 KUA/L
MUGWORT IGE QN: <0.1 KUA/L
MULBERRY (T70) CLASS: 0
MULBERRY (T70) CONC: <0.1 KUA/L
P NOTATUM IGE QN: <0.1 KUA/L
PEANUT IGE QN: <0.1 KUA/L
RED CEDAR IGE QN: <0.1 KUA/L
ROACH IGE QN: <0.1 KUA/L
ROACH IGE QN: <0.1 KUA/L
SCALLOP IGE QN: 0
SCALLOP IGE QN: <0.1 KUA/L
SESAME SEED IGE QN: <0.1 KUA/L
SHEEP SORREL IGE QN: <0.1 KUA/L
SILVER BIRCH IGE QN: <0.1 KUA/L
SOYBEAN IGE QN: <0.1 KUA/L
TIMOTHY IGE QN: <0.1 KUA/L
TIMOTHY IGE QN: <0.1 KUA/L
TOTAL IGE SMQN RAST: 20 KU/L
TREE ALLERG MIX1 IGE QL: 0
WALNUT IGE QN: <0.1 KUA/L
WHEAT IGE QN: <0.1 KUA/L
WHITE ASH IGE QN: <0.1 KUA/L
WHITE ELM IGE QN: 0
WHITE ELM IGE QN: <0.1 KUA/L
WHITE OAK IGE QN: <0.1 KUA/L
WHITE OAK IGE QN: <0.1 KUA/L

## 2022-01-21 ENCOUNTER — NON-APPOINTMENT (OUTPATIENT)
Age: 37
End: 2022-01-21

## 2022-01-21 ENCOUNTER — TRANSCRIPTION ENCOUNTER (OUTPATIENT)
Age: 37
End: 2022-01-21

## 2022-01-24 ENCOUNTER — APPOINTMENT (OUTPATIENT)
Dept: OBGYN | Facility: CLINIC | Age: 37
End: 2022-01-24
Payer: COMMERCIAL

## 2022-01-24 ENCOUNTER — LABORATORY RESULT (OUTPATIENT)
Age: 37
End: 2022-01-24

## 2022-01-24 VITALS
SYSTOLIC BLOOD PRESSURE: 122 MMHG | WEIGHT: 207 LBS | BODY MASS INDEX: 35.34 KG/M2 | DIASTOLIC BLOOD PRESSURE: 68 MMHG | HEIGHT: 64 IN

## 2022-01-24 DIAGNOSIS — Z13.1 ENCOUNTER FOR SCREENING FOR DIABETES MELLITUS: ICD-10-CM

## 2022-01-24 PROCEDURE — 36415 COLL VENOUS BLD VENIPUNCTURE: CPT

## 2022-01-24 PROCEDURE — 0502F SUBSEQUENT PRENATAL CARE: CPT

## 2022-02-04 LAB — GLUCOSE 1H P 50 G GLC PO SERPL-MCNC: 96 MG/DL

## 2022-02-07 ENCOUNTER — NON-APPOINTMENT (OUTPATIENT)
Age: 37
End: 2022-02-07

## 2022-02-08 ENCOUNTER — APPOINTMENT (OUTPATIENT)
Dept: OBGYN | Facility: CLINIC | Age: 37
End: 2022-02-08
Payer: COMMERCIAL

## 2022-02-08 ENCOUNTER — ASOB RESULT (OUTPATIENT)
Age: 37
End: 2022-02-08

## 2022-02-08 VITALS
WEIGHT: 208 LBS | SYSTOLIC BLOOD PRESSURE: 110 MMHG | HEIGHT: 64 IN | DIASTOLIC BLOOD PRESSURE: 70 MMHG | BODY MASS INDEX: 35.51 KG/M2

## 2022-02-08 DIAGNOSIS — O43.219 PLACENTA ACCRETA, UNSPECIFIED TRIMESTER: ICD-10-CM

## 2022-02-08 PROCEDURE — 0502F SUBSEQUENT PRENATAL CARE: CPT

## 2022-02-08 PROCEDURE — 76816 OB US FOLLOW-UP PER FETUS: CPT

## 2022-02-08 PROCEDURE — 90715 TDAP VACCINE 7 YRS/> IM: CPT

## 2022-02-08 PROCEDURE — 90471 IMMUNIZATION ADMIN: CPT

## 2022-02-08 PROCEDURE — 76817 TRANSVAGINAL US OBSTETRIC: CPT

## 2022-02-09 DIAGNOSIS — Z01.812 ENCOUNTER FOR PREPROCEDURAL LABORATORY EXAMINATION: ICD-10-CM

## 2022-02-20 ENCOUNTER — TRANSCRIPTION ENCOUNTER (OUTPATIENT)
Age: 37
End: 2022-02-20

## 2022-02-21 ENCOUNTER — NON-APPOINTMENT (OUTPATIENT)
Age: 37
End: 2022-02-21

## 2022-02-21 LAB
25(OH)D3 SERPL-MCNC: 69.2 NG/ML
BASOPHILS # BLD AUTO: 0.02 K/UL
BASOPHILS NFR BLD AUTO: 0.2 %
EOSINOPHIL # BLD AUTO: 0.15 K/UL
EOSINOPHIL NFR BLD AUTO: 1.6 %
HCT VFR BLD CALC: 35.9 %
HGB BLD-MCNC: 11.4 G/DL
HIV1+2 AB SPEC QL IA.RAPID: NONREACTIVE
IMM GRANULOCYTES NFR BLD AUTO: 0.4 %
LYMPHOCYTES # BLD AUTO: 1.99 K/UL
LYMPHOCYTES NFR BLD AUTO: 21.4 %
MAN DIFF?: NORMAL
MCHC RBC-ENTMCNC: 30.3 PG
MCHC RBC-ENTMCNC: 31.8 GM/DL
MCV RBC AUTO: 95.5 FL
MONOCYTES # BLD AUTO: 0.8 K/UL
MONOCYTES NFR BLD AUTO: 8.6 %
NEUTROPHILS # BLD AUTO: 6.31 K/UL
NEUTROPHILS NFR BLD AUTO: 67.8 %
PLATELET # BLD AUTO: 217 K/UL
RBC # BLD: 3.76 M/UL
RBC # FLD: 14.9 %
T PALLIDUM AB SER QL IA: NEGATIVE
WBC # FLD AUTO: 9.31 K/UL

## 2022-02-22 ENCOUNTER — NON-APPOINTMENT (OUTPATIENT)
Age: 37
End: 2022-02-22

## 2022-02-22 ENCOUNTER — APPOINTMENT (OUTPATIENT)
Dept: OBGYN | Facility: CLINIC | Age: 37
End: 2022-02-22
Payer: COMMERCIAL

## 2022-02-22 VITALS
HEIGHT: 64 IN | SYSTOLIC BLOOD PRESSURE: 123 MMHG | WEIGHT: 212 LBS | BODY MASS INDEX: 36.19 KG/M2 | DIASTOLIC BLOOD PRESSURE: 74 MMHG

## 2022-02-22 PROCEDURE — 0502F SUBSEQUENT PRENATAL CARE: CPT

## 2022-02-23 ENCOUNTER — APPOINTMENT (OUTPATIENT)
Dept: PULMONOLOGY | Facility: CLINIC | Age: 37
End: 2022-02-23
Payer: COMMERCIAL

## 2022-02-23 VITALS
TEMPERATURE: 97.2 F | WEIGHT: 210 LBS | RESPIRATION RATE: 16 BRPM | DIASTOLIC BLOOD PRESSURE: 70 MMHG | HEART RATE: 99 BPM | BODY MASS INDEX: 35.85 KG/M2 | HEIGHT: 64 IN | SYSTOLIC BLOOD PRESSURE: 110 MMHG | OXYGEN SATURATION: 98 %

## 2022-02-23 DIAGNOSIS — E55.9 VITAMIN D DEFICIENCY, UNSPECIFIED: ICD-10-CM

## 2022-02-23 DIAGNOSIS — J45.909 UNSPECIFIED ASTHMA, UNCOMPLICATED: ICD-10-CM

## 2022-02-23 DIAGNOSIS — K21.9 GASTRO-ESOPHAGEAL REFLUX DISEASE W/OUT ESOPHAGITIS: ICD-10-CM

## 2022-02-23 DIAGNOSIS — J30.9 ALLERGIC RHINITIS, UNSPECIFIED: ICD-10-CM

## 2022-02-23 PROCEDURE — 94729 DIFFUSING CAPACITY: CPT

## 2022-02-23 PROCEDURE — 94727 GAS DIL/WSHOT DETER LNG VOL: CPT

## 2022-02-23 PROCEDURE — 99214 OFFICE O/P EST MOD 30 MIN: CPT | Mod: 25

## 2022-02-23 PROCEDURE — 95012 NITRIC OXIDE EXP GAS DETER: CPT

## 2022-02-23 PROCEDURE — 94010 BREATHING CAPACITY TEST: CPT

## 2022-02-23 RX ORDER — TRIAMCINOLONE ACETONIDE 1 MG/G
0.1 CREAM TOPICAL
Qty: 30 | Refills: 0 | Status: ACTIVE | COMMUNITY
Start: 2021-09-25

## 2022-02-23 RX ORDER — MUPIROCIN 20 MG/G
2 OINTMENT TOPICAL
Qty: 22 | Refills: 0 | Status: ACTIVE | COMMUNITY
Start: 2021-09-25

## 2022-02-23 RX ORDER — PANTOPRAZOLE 40 MG/1
40 TABLET, DELAYED RELEASE ORAL
Qty: 90 | Refills: 1 | Status: ACTIVE | COMMUNITY
Start: 2022-02-23 | End: 1900-01-01

## 2022-02-23 NOTE — PROCEDURE
[FreeTextEntry1] : FENO was 22 ; a normal value being less than 25\par Fractional exhaled nitric oxide (FENO) is regarded as a simple, noninvasive method for assessing eosinophilic airway inflammation. Produced by a variety of cells within the lung, nitric oxide (NO) concentrations are generally low in healthy individuals. However, high concentrations of NO appear to be involved in nonspecific host defense mechanisms and chronic inflammatory diseases such as asthma. The American Thoracic Society (ATS) therefore has recommended using FENO to aid in the diagnosis and monitoring of eosinophilic airway inflammation and asthma, and for identifying steroid responsive individuals whose chronic respiratory symptoms may be caused by airway inflammation. \par \par Full PFT revealed normal flows, with a FEV1 of2.69L,which is 92% of predicted,  normal lung volumes, and a diffusion of 20.2 , which is 74% of predicted with a normal flow volume loop

## 2022-02-23 NOTE — REASON FOR VISIT
[Follow-Up] : a follow-up visit [TextBox_44] : active asthma/ left sided pain/ allergies/ gerd/ likely OSAS

## 2022-02-23 NOTE — ADDENDUM
[FreeTextEntry1] : Documented by Chris Paz acting as a scribe for Dr. Dale Brunson on 02/23/2022 \par \par All medical record entries made by the Scribe were at my, Dr. Dale Brunson's, direction and personally dictated by me on 02/23/2022 . I have reviewed the chart and agree that the record accurately reflects my personal performance of the history, physical exam, assessment and plan. I have also personally directed, reviewed, and agree with the discharge instructions

## 2022-02-23 NOTE — ASSESSMENT
[FreeTextEntry1] : Ms. CHILDS is a 36 year old female with a history of  headaches, endometriosis, migraines, thyroid, anxiety, depression, ovarian cyst, asthma, 22+ weeks pregnant with 2nd child, presenting to the office today for follow up pulmonary evaluation. Her chief complaint is active asthma/ left sided pain/ allergies/ gerd/ likely OSAS- improved but plagues but GERD/LPR\par \par Her shortness of breath is multifactorial due to:\par -poor mechanics of breathing \par -out of shape / overweight\par -pulmonary disease\par    -Full PFTs to follow\par -cardiac disease \par \par Problem 1: Active Asthma \par -continue Symbicort 160 2 BID \par - continue Albuterol 0.83% via nebulizer, Q6H \par Asthma is  believed to be caused by inherited (genetic) and environmental factor, but its exact cause is unknown. Asthma may be triggered by allergens, lung infections, or irritants in the air. Asthma triggers are different for each person \par Inhaler technique reviewed as well as oral hygiene techniques reviewed with patient. Avoidance of cold air, extremes of temperature, rescue inhaler should be used before exercise. Order of medication reviewed with patient. Recommended use of a cool mist humidifier in the bedroom. \par \par \par Problem 2: Allergies \par -s/p Blood work to include: asthma panel, food IgE panel, IgE level, eosinophil level, vitamin D level -WNL \par -Recommend Rhinocort \par Environmental measures for allergies were encouraged including mattress and pillow covers, air purifier, and environmental controls. \par \par \par Problem 3: GERD- active \par -continue Pepcid 40mg QHS \par -add Protonix 40 mg QAM, pre-breakfast \par -Rule of 2s: avoid eating too much, eating too late, eating too spicy, eating two hours before bed.\par -Things to avoid including overeating, spicy foods, tight clothing, eating within three hours of bed, this list is not all inclusive. \par -For treatment of reflux, possible options discussed including diet control, H2 blockers, PPIs, as well as coating motility agents discussed as treatment options. Timing of meals and proximity of last meal to sleep were discussed. If symptoms persist, a formal gastrointestinal evaluation is needed. \par \par \par Problem 4: Likely OSAS (Elevated mallampati class, snoring, reflux) \par -Complete home sleep study \par Sleep apnea is associated with adverse clinical consequences which can affect most organ systems.  Cardiovascular disease risk includes arrhythmias, atrial fibrillation, hypertension, coronary artery disease, and stroke. Metabolic disorders include diabetes type 2, non-alcoholic fatty liver disease. Mood disorder especially depression; and cognitive decline especially in the elderly. Associations with  chronic reflux/Abel’s esophagus some but not all inclusive. \par -Reasons  include arousal consistent with hypopnea; respiratory events most prominent in REM sleep or supine position; therefore sleep staging and body position are important for accurate diagnosis and estimation of AHI. \par \par \par Problem 5:  Musculoskeletal chest pain \par -Reproducible chest pains \par -Recommend bracing self when coughing \par -Recommended Topricin \par \par \par problem : cardiac disease\par -recommended to continue to follow up with Cardiologist \par \par problem : poor breathing mechanics\par -Recommended Wim Hof and Buteyko breathing techniques \par -Proper breathing techniques were reviewed with an emphasis of exhalation. Patient instructed to breath in for 1 second and out for four seconds. Patient was encouraged to not talk while walking. \par \par problem : out of shape / overweight\par -Recommended Muniq "OTC" Supplement for controlling blood sugar levels, weight loss, and energy \par -Weight loss, exercise, and diet control were discussed and are highly encouraged. Treatment options were given such as, aqua therapy, and contacting a nutritionist. Recommended to use the elliptical, stationary bike, less use of treadmill. Mindful eating was explained to the patient Obesity is associated with worsening asthma, shortness of breath, and potential for cardiac disease, diabetes, and other underlying medical conditions\par \par problem : health maintenance \par -recommended yearly flu shot after October 15\par -recommended strep pneumonia vaccines: Prevnar-13 vaccine, followed by Pneumo vaccine 23 one year following after 65\par -recommended early intervention for Upper Respiratory Infections (URIs)\par -recommended regular osteoporosis evaluations\par -recommended early dermatological evaluations\par -recommended after the age of 50 to the age of 70, colonoscopy every 5 years\par \par F/U in 6-8 weeks.\par She is encouraged to call with any changes, concerns, or questions

## 2022-02-23 NOTE — HISTORY OF PRESENT ILLNESS
[TextBox_4] : Ms. CHILDS is a 36 year old female with a history of  headaches, endometriosis, migraines, thyroid, anxiety, depression, ovarian cyst, asthma, 22+ weeks pregnant with 2nd child, presenting to the office today for follow up pulmonary evaluation. Her chief complaint is active asthma/ left sided pain/ allergies/ gerd/ likely OSAS\par \par -she notes currently at 30 weeks into pregnancy\par -she notes planned  delivery hysterectomy in one month \par -she notes intermittent cough in the morning and night \par -she notes PND \par -she notes constant heartburn and taking maximum amount of pepcid and TUMs \par -she notes weight is stable and appropriate for pregnancy \par -she notes sour taste at night \par -she notes reflux interrupts sleep \par -she notes IBS \par -she notes SOB caused by cough \par -she notes constant sinus congestion \par -she notes fatigue \par -she notes sleep varies \par -she notes use of albuterol BID \par \par -denies any visual issues, headaches, nausea, vomiting, fever, chills, sweats, chest pain, chest pressure,  dysphagia, dizziness, leg swelling, leg pain, itchy eyes, itchy ears,  or sour taste in the mouth.

## 2022-02-23 NOTE — PHYSICAL EXAM
[No Acute Distress] : no acute distress [Normal Oropharynx] : normal oropharynx [III] : Mallampati Class: III [Normal Appearance] : normal appearance [No Neck Mass] : no neck mass [Normal Rate/Rhythm] : normal rate/rhythm [Normal S1, S2] : normal s1, s2 [No Murmurs] : no murmurs [No Resp Distress] : no resp distress [Clear to Auscultation Bilaterally] : clear to auscultation bilaterally [No Abnormalities] : no abnormalities [Benign] : benign [Normal Gait] : normal gait [No Clubbing] : no clubbing [No Cyanosis] : no cyanosis [No Edema] : no edema [FROM] : FROM [Normal Color/ Pigmentation] : normal color/ pigmentation [No Focal Deficits] : no focal deficits [Oriented x3] : oriented x3 [Normal Affect] : normal affect [TextBox_2] : ow; pregnant  [TextBox_68] : I:E ratio 1:3; clear  [TextBox_89] : reproducible sub-condral pain

## 2022-02-25 ENCOUNTER — NON-APPOINTMENT (OUTPATIENT)
Age: 37
End: 2022-02-25

## 2022-02-25 ENCOUNTER — APPOINTMENT (OUTPATIENT)
Dept: TRAUMA SURGERY | Facility: HOSPITAL | Age: 37
End: 2022-02-25
Payer: COMMERCIAL

## 2022-02-25 VITALS
WEIGHT: 216 LBS | HEART RATE: 111 BPM | DIASTOLIC BLOOD PRESSURE: 69 MMHG | SYSTOLIC BLOOD PRESSURE: 124 MMHG | BODY MASS INDEX: 36.88 KG/M2 | HEIGHT: 64 IN

## 2022-02-25 DIAGNOSIS — K64.9 UNSPECIFIED HEMORRHOIDS: ICD-10-CM

## 2022-02-25 PROCEDURE — 99241 OFFICE CONSULTATION NEW/ESTAB PATIENT 15 MIN: CPT

## 2022-02-25 RX ORDER — PRAMOXINE HYDROCHLORIDE 150 MG/15G
1 AEROSOL, FOAM RECTAL 3 TIMES DAILY
Qty: 1 | Refills: 0 | Status: ACTIVE | COMMUNITY
Start: 2022-02-25 | End: 1900-01-01

## 2022-02-25 NOTE — HISTORY OF PRESENT ILLNESS
[de-identified] : Mrs. Prasad is a pleasant 37 y/o pregnant F who is delivering at the end of march via  and hysterectomy due to the presences of accreta/percreta noted on her abdominal imaging. RADHA has referred her to my office to discuss potential REBOA use for prophylactic control of hemorrhage. She currently feels well and is otherwise asymptomatic regarding her accreta. (Noted back pain and spotting in distant past however).

## 2022-02-25 NOTE — PLAN
[FreeTextEntry1] : I had a long discussion with Orlin regarding right femoral catheterization and balloon inflation of the REBOA. We discussed how ischemia may affect the lower limb blood flow which may result in compartment syndrome and thrombosis. I discussed the operative interventions for those complications. I also outlined the benefit of the catheter given the chance for traumatic hemorrhage. I outlined the procedure, timing and postop monitoring of her puncture site and legs. She understands the research aspect of REBOA; although it is an FDA approved device the application for prophylactic use is under investigation. \par Orlin was satisfied with the description and the pictures i used to describe the procedure.  She will think about the procedure as an adjunct to her primary procedure and will sign consent on the day of surgery. Ok to stay on ASA 81mg BID from my standpoint. \par \par The acute care surgery team can be available when needed - please call for definitive date scheduling. \par \par I spent 15min reviewing data, images and information. Greater than 50% of my time was spent in face to face discussion regarding risks, benefits, postoperative activity and scheduling.\par \par Yohannes Turner MD\par [phone]\par Acute and Critical Care Surgery\par

## 2022-02-28 ENCOUNTER — NON-APPOINTMENT (OUTPATIENT)
Age: 37
End: 2022-02-28

## 2022-02-28 ENCOUNTER — RX CHANGE (OUTPATIENT)
Age: 37
End: 2022-02-28

## 2022-03-02 ENCOUNTER — APPOINTMENT (OUTPATIENT)
Dept: OBGYN | Facility: CLINIC | Age: 37
End: 2022-03-02
Payer: COMMERCIAL

## 2022-03-02 VITALS
BODY MASS INDEX: 37.05 KG/M2 | DIASTOLIC BLOOD PRESSURE: 66 MMHG | SYSTOLIC BLOOD PRESSURE: 105 MMHG | HEIGHT: 64 IN | WEIGHT: 217 LBS

## 2022-03-02 PROCEDURE — 0502F SUBSEQUENT PRENATAL CARE: CPT

## 2022-03-04 ENCOUNTER — NON-APPOINTMENT (OUTPATIENT)
Age: 37
End: 2022-03-04

## 2022-03-07 ENCOUNTER — ASOB RESULT (OUTPATIENT)
Age: 37
End: 2022-03-07

## 2022-03-07 ENCOUNTER — APPOINTMENT (OUTPATIENT)
Dept: OBGYN | Facility: CLINIC | Age: 37
End: 2022-03-07
Payer: COMMERCIAL

## 2022-03-07 PROCEDURE — 76816 OB US FOLLOW-UP PER FETUS: CPT

## 2022-03-07 PROCEDURE — 0502F SUBSEQUENT PRENATAL CARE: CPT

## 2022-03-07 PROCEDURE — 76819 FETAL BIOPHYS PROFIL W/O NST: CPT

## 2022-03-11 ENCOUNTER — NON-APPOINTMENT (OUTPATIENT)
Age: 37
End: 2022-03-11

## 2022-03-15 ENCOUNTER — APPOINTMENT (OUTPATIENT)
Dept: OBGYN | Facility: CLINIC | Age: 37
End: 2022-03-15
Payer: COMMERCIAL

## 2022-03-15 ENCOUNTER — ASOB RESULT (OUTPATIENT)
Age: 37
End: 2022-03-15

## 2022-03-15 DIAGNOSIS — Z36.84: ICD-10-CM

## 2022-03-15 PROCEDURE — 76819 FETAL BIOPHYS PROFIL W/O NST: CPT

## 2022-03-15 PROCEDURE — 0502F SUBSEQUENT PRENATAL CARE: CPT

## 2022-03-15 RX ORDER — BETAMETHASONE SOD PHOSPH-WATER 6 MG/ML
12 VIAL (ML) INJECTION
Refills: 0 | Status: COMPLETED | OUTPATIENT
Start: 2022-03-15

## 2022-03-15 RX ADMIN — BETAMETHASONE SODIUM PHOSPHATE AND BETAMETHASONE ACETATE 0 MG/2ML: 3; 3 INJECTION, SUSPENSION INTRA-ARTICULAR; INTRALESIONAL; INTRAMUSCULAR; SOFT TISSUE at 00:00

## 2022-03-16 ENCOUNTER — NON-APPOINTMENT (OUTPATIENT)
Age: 37
End: 2022-03-16

## 2022-03-16 ENCOUNTER — APPOINTMENT (OUTPATIENT)
Dept: OBGYN | Facility: CLINIC | Age: 37
End: 2022-03-16
Payer: COMMERCIAL

## 2022-03-16 PROCEDURE — 96372 THER/PROPH/DIAG INJ SC/IM: CPT

## 2022-03-16 RX ORDER — BETAMETHASONE SOD PHOSPH-WATER 6 MG/ML
12 VIAL (ML) INJECTION
Refills: 0 | Status: COMPLETED | OUTPATIENT
Start: 2022-03-16

## 2022-03-16 RX ADMIN — BETAMETHASONE SODIUM PHOSPHATE AND BETAMETHASONE ACETATE 0 MG/2ML: 3; 3 INJECTION, SUSPENSION INTRA-ARTICULAR; INTRALESIONAL; INTRAMUSCULAR; SOFT TISSUE at 00:00

## 2022-03-18 ENCOUNTER — OUTPATIENT (OUTPATIENT)
Dept: OUTPATIENT SERVICES | Facility: HOSPITAL | Age: 37
LOS: 1 days | End: 2022-03-18

## 2022-03-18 ENCOUNTER — OUTPATIENT (OUTPATIENT)
Dept: OUTPATIENT SERVICES | Facility: HOSPITAL | Age: 37
LOS: 1 days | End: 2022-03-18
Payer: COMMERCIAL

## 2022-03-18 VITALS
WEIGHT: 216.93 LBS | RESPIRATION RATE: 18 BRPM | OXYGEN SATURATION: 98 % | TEMPERATURE: 98 F | SYSTOLIC BLOOD PRESSURE: 110 MMHG | DIASTOLIC BLOOD PRESSURE: 72 MMHG | HEART RATE: 94 BPM | HEIGHT: 64 IN

## 2022-03-18 DIAGNOSIS — Z98.89 OTHER SPECIFIED POSTPROCEDURAL STATES: Chronic | ICD-10-CM

## 2022-03-18 DIAGNOSIS — Z98.891 HISTORY OF UTERINE SCAR FROM PREVIOUS SURGERY: Chronic | ICD-10-CM

## 2022-03-18 DIAGNOSIS — Z52.811 EGG (OOCYTE) DONOR UNDER AGE 35, DESIGNATED RECIPIENT: Chronic | ICD-10-CM

## 2022-03-18 DIAGNOSIS — Z01.818 ENCOUNTER FOR OTHER PREPROCEDURAL EXAMINATION: ICD-10-CM

## 2022-03-18 DIAGNOSIS — Z98.890 OTHER SPECIFIED POSTPROCEDURAL STATES: Chronic | ICD-10-CM

## 2022-03-18 DIAGNOSIS — O43.219 PLACENTA ACCRETA, UNSPECIFIED TRIMESTER: ICD-10-CM

## 2022-03-18 DIAGNOSIS — Z11.52 ENCOUNTER FOR SCREENING FOR COVID-19: ICD-10-CM

## 2022-03-18 DIAGNOSIS — Z29.9 ENCOUNTER FOR PROPHYLACTIC MEASURES, UNSPECIFIED: ICD-10-CM

## 2022-03-18 DIAGNOSIS — Z90.89 ACQUIRED ABSENCE OF OTHER ORGANS: Chronic | ICD-10-CM

## 2022-03-18 LAB
A1C WITH ESTIMATED AVERAGE GLUCOSE RESULT: 5 % — SIGNIFICANT CHANGE UP (ref 4–5.6)
ANION GAP SERPL CALC-SCNC: 12 MMOL/L — SIGNIFICANT CHANGE UP (ref 5–17)
BLD GP AB SCN SERPL QL: NEGATIVE — SIGNIFICANT CHANGE UP
BUN SERPL-MCNC: 10 MG/DL — SIGNIFICANT CHANGE UP (ref 7–23)
CALCIUM SERPL-MCNC: 9.1 MG/DL — SIGNIFICANT CHANGE UP (ref 8.4–10.5)
CHLORIDE SERPL-SCNC: 104 MMOL/L — SIGNIFICANT CHANGE UP (ref 96–108)
CO2 SERPL-SCNC: 22 MMOL/L — SIGNIFICANT CHANGE UP (ref 22–31)
CREAT SERPL-MCNC: 0.89 MG/DL — SIGNIFICANT CHANGE UP (ref 0.5–1.3)
EGFR: 87 ML/MIN/1.73M2 — SIGNIFICANT CHANGE UP
ESTIMATED AVERAGE GLUCOSE: 97 MG/DL — SIGNIFICANT CHANGE UP (ref 68–114)
GLUCOSE SERPL-MCNC: 84 MG/DL — SIGNIFICANT CHANGE UP (ref 70–99)
HCT VFR BLD CALC: 32.7 % — LOW (ref 34.5–45)
HGB BLD-MCNC: 10.4 G/DL — LOW (ref 11.5–15.5)
MCHC RBC-ENTMCNC: 30.1 PG — SIGNIFICANT CHANGE UP (ref 27–34)
MCHC RBC-ENTMCNC: 31.8 GM/DL — LOW (ref 32–36)
MCV RBC AUTO: 94.5 FL — SIGNIFICANT CHANGE UP (ref 80–100)
NRBC # BLD: 0 /100 WBCS — SIGNIFICANT CHANGE UP (ref 0–0)
PLATELET # BLD AUTO: 193 K/UL — SIGNIFICANT CHANGE UP (ref 150–400)
POTASSIUM SERPL-MCNC: 4 MMOL/L — SIGNIFICANT CHANGE UP (ref 3.5–5.3)
POTASSIUM SERPL-SCNC: 4 MMOL/L — SIGNIFICANT CHANGE UP (ref 3.5–5.3)
RBC # BLD: 3.46 M/UL — LOW (ref 3.8–5.2)
RBC # FLD: 14.9 % — HIGH (ref 10.3–14.5)
RH IG SCN BLD-IMP: POSITIVE — SIGNIFICANT CHANGE UP
SARS-COV-2 RNA SPEC QL NAA+PROBE: SIGNIFICANT CHANGE UP
SODIUM SERPL-SCNC: 138 MMOL/L — SIGNIFICANT CHANGE UP (ref 135–145)
WBC # BLD: 9.99 K/UL — SIGNIFICANT CHANGE UP (ref 3.8–10.5)
WBC # FLD AUTO: 9.99 K/UL — SIGNIFICANT CHANGE UP (ref 3.8–10.5)

## 2022-03-18 PROCEDURE — U0005: CPT

## 2022-03-18 PROCEDURE — 86850 RBC ANTIBODY SCREEN: CPT

## 2022-03-18 PROCEDURE — U0003: CPT

## 2022-03-18 PROCEDURE — 85027 COMPLETE CBC AUTOMATED: CPT

## 2022-03-18 PROCEDURE — G0463: CPT

## 2022-03-18 PROCEDURE — 83036 HEMOGLOBIN GLYCOSYLATED A1C: CPT

## 2022-03-18 PROCEDURE — 80048 BASIC METABOLIC PNL TOTAL CA: CPT

## 2022-03-18 PROCEDURE — 86901 BLOOD TYPING SEROLOGIC RH(D): CPT

## 2022-03-18 PROCEDURE — 86900 BLOOD TYPING SEROLOGIC ABO: CPT

## 2022-03-18 PROCEDURE — C9803: CPT

## 2022-03-18 RX ORDER — LEVOTHYROXINE SODIUM 125 MCG
1 TABLET ORAL
Qty: 0 | Refills: 0 | DISCHARGE

## 2022-03-18 RX ORDER — CEFOTETAN DISODIUM 1 G
2 VIAL (EA) INJECTION ONCE
Refills: 0 | Status: DISCONTINUED | OUTPATIENT
Start: 2022-03-21 | End: 2022-03-21

## 2022-03-18 RX ORDER — HYOSCYAMINE SULFATE 0.13 MG
1 TABLET ORAL
Qty: 0 | Refills: 0 | DISCHARGE

## 2022-03-18 NOTE — H&P PST ADULT - NSICDXPASTMEDICALHX_GEN_ALL_CORE_FT
PAST MEDICAL HISTORY:  Anxiety     Asthma last exacerbation 2/2022    Chronic seasonal allergic rhinitis     COVID-19 virus infection with SOB, fever, c-diff diarrhea (treated), but no pneumonia, no hospitalization, 11/2020    Depression     Female infertility     Hyperlipidemia     Hypothyroid     Migraines     Placenta accreta, unspecified trimester     Uterine myoma

## 2022-03-18 NOTE — H&P PST ADULT - ASSESSMENT
CAPRINI SCORE [CLOT updated 18]    AGE RELATED RISK FACTORS                                                       MOBILITY RELATED FACTORS  [ ] Age 41-60 years                                            (1 Point)                    [ ] Bed rest                                                        (1 Point)  [ ] Age: 61-74 years                                           (2 Points)                  [ ] Plaster cast                                                   (2 Points)  [ ] Age= 75 years                                              (3 Points)                    [ ] Bed bound for more than 72 hours                 (2 Points)    DISEASE RELATED RISK FACTORS                                               GENDER SPECIFIC FACTORS  [ ] Edema in the lower extremities                       (1 Point)              [ ] Pregnancy                                                     (1 Point)  [ ] Varicose veins                                               (1 Point)                     [ ] Post-partum < 6 weeks                                   (1 Point)             [ ] BMI > 25 Kg/m2                                            (1 Point)                     [ ] Hormonal therapy  or oral contraception          (1 Point)                 [ ] Sepsis (in the previous month)                        (1 Point)               [ ] History of pregnancy complications                 (1 point)  [ ] Pneumonia or serious lung disease                                               [ ] Unexplained or recurrent                     (1 Point)           (in the previous month)                               (1 Point)  [ ] Abnormal pulmonary function test                     (1 Point)                 SURGERY RELATED RISK FACTORS  [ ] Acute myocardial infarction                              (1 Point)               [ ]  Section                                             (1 Point)  [ ] Congestive heart failure (in the previous month)  (1 Point)      [ ] Minor surgery                                                  (1 Point)   [ ] Inflammatory bowel disease                             (1 Point)               [ ] Arthroscopic surgery                                        (2 Points)  [ ] Central venous access                                      (2 Points)                [ ] General surgery lasting more than 45 minutes (2 points)  [ ] Present or previous malignancy                     (2 Points)                [ ] Elective arthroplasty                                         (5 points)    [ ] Stroke (in the previous month)                          (5 Points)                                                                                                                                                           HEMATOLOGY RELATED FACTORS                                                 TRAUMA RELATED RISK FACTORS  [ ] Prior episodes of VTE                                     (3 Points)                [ ] Fracture of the hip, pelvis, or leg                       (5 Points)  [ ] Positive family history for VTE                         (3 Points)             [ ] Acute spinal cord injury (in the previous month)  (5 Points)  [ ] Prothrombin 57561 A                                     (3 Points)               [ ] Paralysis  (less than 1 month)                             (5 Points)  [ ] Factor V Leiden                                             (3 Points)                  [ ] Multiple Trauma within 1 month                        (5 Points)  [ ] Lupus anticoagulants                                     (3 Points)                                                           [ ] Anticardiolipin antibodies                               (3 Points)                                                       [ ] High homocysteine in the blood                      (3 Points)                                             [ ] Other congenital or acquired thrombophilia      (3 Points)                                                [ ] Heparin induced thrombocytopenia                  (3 Points)                                     Total Score [  5  ]

## 2022-03-18 NOTE — H&P PST ADULT - NSICDXFAMILYHX_GEN_ALL_CORE_FT
FAMILY HISTORY:  Father  Still living? Unknown  Family history of hypothyroidism, Age at diagnosis: Age Unknown    Mother  Still living? Yes, Estimated age: Age Unknown  Family history of hypothyroidism, Age at diagnosis: Age Unknown

## 2022-03-18 NOTE — H&P PST ADULT - ATTENDING COMMENTS
Patient with placenta accreta (MRI confirmed), here today for planned cesearean hysterectomy, bilateral salpingectomy. Discussed risks of procedure including but not limited to VTE, SSI, damage to bowel, bladder, ureter, need for transfusion. Reviewed post operative recovery. All questions answered. Patient willing to accept a blood tranfusion.    Lindsey Yousif MD

## 2022-03-18 NOTE — H&P PST ADULT - HISTORY OF PRESENT ILLNESS
37 YO F  @33 wks and 5days gestation w/ PMH: of hypothyroid, migraines, seasonal allergies, infertility & IVF, PSH: hysteroscopy, uterine myomectomy x4, multiple egg retrievals c/o intrauterine scar, presents to [facility] for a  Section for Placenta Accreta and Abdominal Hysterectomy w/ Bilateral Salpingectomy on 3/21/22.  Denies recent fevers, chills, cough, chest pain, palpitations or SOB and feels well otherwise.     Covid test @ [facility] today 3/18/22

## 2022-03-18 NOTE — H&P PST ADULT - PROBLEM SELECTOR PLAN 1
Scheduled for sx on 3/21/22. Surgical and chlorhexidine instructions reviewed w/ pt. COVID testing completed today at [facility].

## 2022-03-18 NOTE — H&P PST ADULT - DATE OF FIRST COVID-19 BOOSTER
I have seen and examined the patient, and there are no changes from the attached H+P.  Risks, benefits, and alternatives to surgery have been discussed. Plan right carpal tunnel release, right ring finger A1 pulley release, right thumb A1 pulley release.    Haim Burgos MD  Orthopaedic Surgery  1/20/2022 8:57 AM         20-Nov-2021

## 2022-03-18 NOTE — H&P PST ADULT - WILL THE PATIENT ACCEPT THE PFIZER COVID-19 VACCINE IF ELIGIBLE AND IT IS AVAILABLE?
SNF Follow-up Note      Responses   Acute Facility Discharged From  [facility]   Acute Discharge Date  12/03/19   Name of the Skilled Nursing Facility?  [facility] [phone]   Purpose of SNF Admission  SN, OT, PT   Estimated length of stay for the patient?  TBD   Who is the insurance provider or payor of patient stay?  Medicare   Progression of Patient?  New SNF admission following acute event          Elke Hyatt RN  Ambulatory     12/3/2019, 12:51 PM   Not applicable

## 2022-03-20 ENCOUNTER — TRANSCRIPTION ENCOUNTER (OUTPATIENT)
Age: 37
End: 2022-03-20

## 2022-03-21 ENCOUNTER — INPATIENT (INPATIENT)
Facility: HOSPITAL | Age: 37
LOS: 4 days | Discharge: HOME | End: 2022-03-26
Attending: STUDENT IN AN ORGANIZED HEALTH CARE EDUCATION/TRAINING PROGRAM | Admitting: STUDENT IN AN ORGANIZED HEALTH CARE EDUCATION/TRAINING PROGRAM
Payer: COMMERCIAL

## 2022-03-21 VITALS
OXYGEN SATURATION: 98 % | RESPIRATION RATE: 16 BRPM | DIASTOLIC BLOOD PRESSURE: 67 MMHG | HEART RATE: 100 BPM | TEMPERATURE: 98 F | SYSTOLIC BLOOD PRESSURE: 109 MMHG

## 2022-03-21 DIAGNOSIS — Z98.891 HISTORY OF UTERINE SCAR FROM PREVIOUS SURGERY: Chronic | ICD-10-CM

## 2022-03-21 DIAGNOSIS — Z98.89 OTHER SPECIFIED POSTPROCEDURAL STATES: Chronic | ICD-10-CM

## 2022-03-21 DIAGNOSIS — Z52.811 EGG (OOCYTE) DONOR UNDER AGE 35, DESIGNATED RECIPIENT: Chronic | ICD-10-CM

## 2022-03-21 DIAGNOSIS — O43.219 PLACENTA ACCRETA, UNSPECIFIED TRIMESTER: ICD-10-CM

## 2022-03-21 DIAGNOSIS — Z90.89 ACQUIRED ABSENCE OF OTHER ORGANS: Chronic | ICD-10-CM

## 2022-03-21 DIAGNOSIS — Z98.890 OTHER SPECIFIED POSTPROCEDURAL STATES: Chronic | ICD-10-CM

## 2022-03-21 DIAGNOSIS — Z01.818 ENCOUNTER FOR OTHER PREPROCEDURAL EXAMINATION: ICD-10-CM

## 2022-03-21 LAB
ALBUMIN SERPL ELPH-MCNC: 3.2 G/DL — LOW (ref 3.3–5)
ALP SERPL-CCNC: 46 U/L — SIGNIFICANT CHANGE UP (ref 40–120)
ALT FLD-CCNC: 19 U/L — SIGNIFICANT CHANGE UP (ref 10–45)
ANION GAP SERPL CALC-SCNC: 10 MMOL/L — SIGNIFICANT CHANGE UP (ref 5–17)
ANION GAP SERPL CALC-SCNC: 14 MMOL/L — SIGNIFICANT CHANGE UP (ref 5–17)
APTT BLD: 24.9 SEC — LOW (ref 27.5–35.5)
APTT BLD: 27.2 SEC — LOW (ref 27.5–35.5)
APTT BLD: 61.3 SEC — HIGH (ref 27.5–35.5)
AST SERPL-CCNC: 27 U/L — SIGNIFICANT CHANGE UP (ref 10–40)
BILIRUB DIRECT SERPL-MCNC: 0.3 MG/DL — SIGNIFICANT CHANGE UP (ref 0–0.3)
BILIRUB INDIRECT FLD-MCNC: 1.5 MG/DL — HIGH (ref 0.2–1)
BILIRUB SERPL-MCNC: 1.8 MG/DL — HIGH (ref 0.2–1.2)
BLD GP AB SCN SERPL QL: NEGATIVE — SIGNIFICANT CHANGE UP
BUN SERPL-MCNC: 7 MG/DL — SIGNIFICANT CHANGE UP (ref 7–23)
BUN SERPL-MCNC: 7 MG/DL — SIGNIFICANT CHANGE UP (ref 7–23)
CALCIUM SERPL-MCNC: 11.2 MG/DL — HIGH (ref 8.4–10.5)
CALCIUM SERPL-MCNC: 8.8 MG/DL — SIGNIFICANT CHANGE UP (ref 8.4–10.5)
CHLORIDE SERPL-SCNC: 106 MMOL/L — SIGNIFICANT CHANGE UP (ref 96–108)
CHLORIDE SERPL-SCNC: 111 MMOL/L — HIGH (ref 96–108)
CO2 SERPL-SCNC: 14 MMOL/L — LOW (ref 22–31)
CO2 SERPL-SCNC: 21 MMOL/L — LOW (ref 22–31)
COVID-19 SPIKE DOMAIN AB INTERP: POSITIVE
COVID-19 SPIKE DOMAIN ANTIBODY RESULT: >250 U/ML — HIGH
CREAT SERPL-MCNC: 0.53 MG/DL — SIGNIFICANT CHANGE UP (ref 0.5–1.3)
CREAT SERPL-MCNC: 0.62 MG/DL — SIGNIFICANT CHANGE UP (ref 0.5–1.3)
EGFR: 119 ML/MIN/1.73M2 — SIGNIFICANT CHANGE UP
EGFR: 124 ML/MIN/1.73M2 — SIGNIFICANT CHANGE UP
FIBRINOGEN PPP-MCNC: 340 MG/DL — SIGNIFICANT CHANGE UP (ref 330–520)
FIBRINOGEN PPP-MCNC: 398 MG/DL — SIGNIFICANT CHANGE UP (ref 330–520)
GAS PNL BLDA: SIGNIFICANT CHANGE UP
GLUCOSE SERPL-MCNC: 103 MG/DL — HIGH (ref 70–99)
GLUCOSE SERPL-MCNC: 271 MG/DL — HIGH (ref 70–99)
HCT VFR BLD CALC: 28.3 % — LOW (ref 34.5–45)
HCT VFR BLD CALC: 29.3 % — LOW (ref 34.5–45)
HCT VFR BLD CALC: 38.6 % — SIGNIFICANT CHANGE UP (ref 34.5–45)
HEPARINASE TEG R TIME: 5.4 MIN — SIGNIFICANT CHANGE UP (ref 4.3–8.3)
HGB BLD-MCNC: 10 G/DL — LOW (ref 11.5–15.5)
HGB BLD-MCNC: 12.6 G/DL — SIGNIFICANT CHANGE UP (ref 11.5–15.5)
HGB BLD-MCNC: 9.7 G/DL — LOW (ref 11.5–15.5)
INR BLD: 1.07 RATIO — SIGNIFICANT CHANGE UP (ref 0.88–1.16)
INR BLD: 1.13 RATIO — SIGNIFICANT CHANGE UP (ref 0.88–1.16)
INR BLD: 1.16 RATIO — SIGNIFICANT CHANGE UP (ref 0.88–1.16)
MAGNESIUM SERPL-MCNC: 1.7 MG/DL — SIGNIFICANT CHANGE UP (ref 1.6–2.6)
MCHC RBC-ENTMCNC: 29 PG — SIGNIFICANT CHANGE UP (ref 27–34)
MCHC RBC-ENTMCNC: 29.5 PG — SIGNIFICANT CHANGE UP (ref 27–34)
MCHC RBC-ENTMCNC: 29.6 PG — SIGNIFICANT CHANGE UP (ref 27–34)
MCHC RBC-ENTMCNC: 32.6 GM/DL — SIGNIFICANT CHANGE UP (ref 32–36)
MCHC RBC-ENTMCNC: 34.1 GM/DL — SIGNIFICANT CHANGE UP (ref 32–36)
MCHC RBC-ENTMCNC: 34.3 GM/DL — SIGNIFICANT CHANGE UP (ref 32–36)
MCV RBC AUTO: 86 FL — SIGNIFICANT CHANGE UP (ref 80–100)
MCV RBC AUTO: 86.7 FL — SIGNIFICANT CHANGE UP (ref 80–100)
MCV RBC AUTO: 88.9 FL — SIGNIFICANT CHANGE UP (ref 80–100)
NRBC # BLD: 0 /100 WBCS — SIGNIFICANT CHANGE UP (ref 0–0)
PHOSPHATE SERPL-MCNC: 4.7 MG/DL — HIGH (ref 2.5–4.5)
PLATELET # BLD AUTO: 161 K/UL — SIGNIFICANT CHANGE UP (ref 150–400)
PLATELET # BLD AUTO: 55 K/UL — LOW (ref 150–400)
PLATELET # BLD AUTO: 67 K/UL — LOW (ref 150–400)
POTASSIUM SERPL-MCNC: 4.2 MMOL/L — SIGNIFICANT CHANGE UP (ref 3.5–5.3)
POTASSIUM SERPL-MCNC: 6.8 MMOL/L — CRITICAL HIGH (ref 3.5–5.3)
POTASSIUM SERPL-SCNC: 4.2 MMOL/L — SIGNIFICANT CHANGE UP (ref 3.5–5.3)
POTASSIUM SERPL-SCNC: 6.8 MMOL/L — CRITICAL HIGH (ref 3.5–5.3)
PROT SERPL-MCNC: 5.2 G/DL — LOW (ref 6–8.3)
PROTHROM AB SERPL-ACNC: 12.4 SEC — SIGNIFICANT CHANGE UP (ref 10.5–13.4)
PROTHROM AB SERPL-ACNC: 13.1 SEC — SIGNIFICANT CHANGE UP (ref 10.5–13.4)
PROTHROM AB SERPL-ACNC: 13.5 SEC — HIGH (ref 10.5–13.4)
RAPIDTEG MAXIMUM AMPLITUDE: 50.6 MM (ref 52–70)
RAPIDTEG MAXIMUM AMPLITUDE: 54.2 MM — SIGNIFICANT CHANGE UP (ref 52–70)
RBC # BLD: 3.29 M/UL — LOW (ref 3.8–5.2)
RBC # BLD: 3.38 M/UL — LOW (ref 3.8–5.2)
RBC # BLD: 4.34 M/UL — SIGNIFICANT CHANGE UP (ref 3.8–5.2)
RBC # FLD: 14.3 % — SIGNIFICANT CHANGE UP (ref 10.3–14.5)
RBC # FLD: 14.3 % — SIGNIFICANT CHANGE UP (ref 10.3–14.5)
RBC # FLD: 14.7 % — HIGH (ref 10.3–14.5)
RH IG SCN BLD-IMP: POSITIVE — SIGNIFICANT CHANGE UP
SARS-COV-2 IGG+IGM SERPL QL IA: >250 U/ML — HIGH
SARS-COV-2 IGG+IGM SERPL QL IA: POSITIVE
SODIUM SERPL-SCNC: 135 MMOL/L — SIGNIFICANT CHANGE UP (ref 135–145)
SODIUM SERPL-SCNC: 141 MMOL/L — SIGNIFICANT CHANGE UP (ref 135–145)
T PALLIDUM AB TITR SER: NEGATIVE — SIGNIFICANT CHANGE UP
TEG FUNCTIONAL FIBRINOGEN: 16.8 MM — SIGNIFICANT CHANGE UP (ref 15–32)
TEG FUNCTIONAL FIBRINOGEN: 18.2 MM — SIGNIFICANT CHANGE UP (ref 15–32)
TEG LY30 (LYSIS): 0 % — SIGNIFICANT CHANGE UP (ref 0–2.6)
TEG MAXIMUM AMPLITUDE: 57.4 MM — SIGNIFICANT CHANGE UP (ref 52–69)
TEG REACTION TIME: 4.3 MIN (ref 4.6–9.1)
TEG REACTION TIME: 9.6 MIN (ref 4.6–9.1)
WBC # BLD: 24.04 K/UL — HIGH (ref 3.8–10.5)
WBC # BLD: 9.17 K/UL — SIGNIFICANT CHANGE UP (ref 3.8–10.5)
WBC # BLD: 9.43 K/UL — SIGNIFICANT CHANGE UP (ref 3.8–10.5)
WBC # FLD AUTO: 24.04 K/UL — HIGH (ref 3.8–10.5)
WBC # FLD AUTO: 9.17 K/UL — SIGNIFICANT CHANGE UP (ref 3.8–10.5)
WBC # FLD AUTO: 9.43 K/UL — SIGNIFICANT CHANGE UP (ref 3.8–10.5)

## 2022-03-21 PROCEDURE — 88307 TISSUE EXAM BY PATHOLOGIST: CPT | Mod: 26

## 2022-03-21 PROCEDURE — 88302 TISSUE EXAM BY PATHOLOGIST: CPT | Mod: 26

## 2022-03-21 PROCEDURE — 71045 X-RAY EXAM CHEST 1 VIEW: CPT | Mod: 26

## 2022-03-21 PROCEDURE — 99291 CRITICAL CARE FIRST HOUR: CPT

## 2022-03-21 PROCEDURE — 59525 REMOVE UTERUS AFTER CESAREAN: CPT

## 2022-03-21 PROCEDURE — 59510 CESAREAN DELIVERY: CPT

## 2022-03-21 DEVICE — SURGICEL 2 X 14": Type: IMPLANTABLE DEVICE | Status: FUNCTIONAL

## 2022-03-21 DEVICE — SURGICEL FIBRILLAR 2 X 4": Type: IMPLANTABLE DEVICE | Status: FUNCTIONAL

## 2022-03-21 DEVICE — VISTASEAL FIBRIN HUMAN 4ML: Type: IMPLANTABLE DEVICE | Status: FUNCTIONAL

## 2022-03-21 DEVICE — INTERCEED 3 X 4": Type: IMPLANTABLE DEVICE | Status: FUNCTIONAL

## 2022-03-21 RX ORDER — MAGNESIUM SULFATE 500 MG/ML
2 VIAL (ML) INJECTION ONCE
Refills: 0 | Status: COMPLETED | OUTPATIENT
Start: 2022-03-21 | End: 2022-03-21

## 2022-03-21 RX ORDER — HYDROMORPHONE HYDROCHLORIDE 2 MG/ML
0.5 INJECTION INTRAMUSCULAR; INTRAVENOUS; SUBCUTANEOUS
Refills: 0 | Status: DISCONTINUED | OUTPATIENT
Start: 2022-03-21 | End: 2022-03-22

## 2022-03-21 RX ORDER — LEVOTHYROXINE SODIUM 125 MCG
80 TABLET ORAL
Refills: 0 | Status: DISCONTINUED | OUTPATIENT
Start: 2022-03-21 | End: 2022-03-24

## 2022-03-21 RX ORDER — BUDESONIDE 32 UG/1
0 SPRAY, METERED NASAL
Qty: 0 | Refills: 0 | DISCHARGE

## 2022-03-21 RX ORDER — CITRIC ACID/SODIUM CITRATE 300-500 MG
15 SOLUTION, ORAL ORAL ONCE
Refills: 0 | Status: COMPLETED | OUTPATIENT
Start: 2022-03-21 | End: 2022-03-21

## 2022-03-21 RX ORDER — CHLORHEXIDINE GLUCONATE 213 G/1000ML
1 SOLUTION TOPICAL DAILY
Refills: 0 | Status: DISCONTINUED | OUTPATIENT
Start: 2022-03-21 | End: 2022-03-26

## 2022-03-21 RX ORDER — ALBUTEROL 90 UG/1
2 AEROSOL, METERED ORAL
Qty: 0 | Refills: 0 | DISCHARGE

## 2022-03-21 RX ORDER — PANTOPRAZOLE SODIUM 20 MG/1
40 TABLET, DELAYED RELEASE ORAL DAILY
Refills: 0 | Status: DISCONTINUED | OUTPATIENT
Start: 2022-03-21 | End: 2022-03-22

## 2022-03-21 RX ORDER — ERTAPENEM SODIUM 1 G/1
1000 INJECTION, POWDER, LYOPHILIZED, FOR SOLUTION INTRAMUSCULAR; INTRAVENOUS EVERY 24 HOURS
Refills: 0 | Status: COMPLETED | OUTPATIENT
Start: 2022-03-21 | End: 2022-03-22

## 2022-03-21 RX ORDER — OXYCODONE HYDROCHLORIDE 5 MG/1
10 TABLET ORAL ONCE
Refills: 0 | Status: DISCONTINUED | OUTPATIENT
Start: 2022-03-21 | End: 2022-03-21

## 2022-03-21 RX ORDER — PROPOFOL 10 MG/ML
75 INJECTION, EMULSION INTRAVENOUS
Qty: 1000 | Refills: 0 | Status: DISCONTINUED | OUTPATIENT
Start: 2022-03-21 | End: 2022-03-21

## 2022-03-21 RX ORDER — SODIUM CHLORIDE 9 MG/ML
10 INJECTION INTRAMUSCULAR; INTRAVENOUS; SUBCUTANEOUS
Refills: 0 | Status: DISCONTINUED | OUTPATIENT
Start: 2022-03-21 | End: 2022-03-22

## 2022-03-21 RX ORDER — FAMOTIDINE 10 MG/ML
1 INJECTION INTRAVENOUS
Qty: 0 | Refills: 0 | DISCHARGE

## 2022-03-21 RX ORDER — CHLORHEXIDINE GLUCONATE 213 G/1000ML
1 SOLUTION TOPICAL
Refills: 0 | Status: DISCONTINUED | OUTPATIENT
Start: 2022-03-21 | End: 2022-03-26

## 2022-03-21 RX ORDER — SODIUM CHLORIDE 9 MG/ML
1000 INJECTION, SOLUTION INTRAVENOUS ONCE
Refills: 0 | Status: COMPLETED | OUTPATIENT
Start: 2022-03-21 | End: 2022-03-21

## 2022-03-21 RX ORDER — CHOLECALCIFEROL (VITAMIN D3) 125 MCG
2 CAPSULE ORAL
Qty: 0 | Refills: 0 | DISCHARGE

## 2022-03-21 RX ORDER — LORATADINE 10 MG/1
1 TABLET ORAL
Qty: 0 | Refills: 0 | DISCHARGE

## 2022-03-21 RX ORDER — LEVOTHYROXINE SODIUM 125 MCG
1 TABLET ORAL
Qty: 0 | Refills: 0 | DISCHARGE

## 2022-03-21 RX ORDER — CHLORHEXIDINE GLUCONATE 213 G/1000ML
15 SOLUTION TOPICAL EVERY 12 HOURS
Refills: 0 | Status: DISCONTINUED | OUTPATIENT
Start: 2022-03-21 | End: 2022-03-21

## 2022-03-21 RX ORDER — ONDANSETRON 8 MG/1
4 TABLET, FILM COATED ORAL EVERY 6 HOURS
Refills: 0 | Status: COMPLETED | OUTPATIENT
Start: 2022-03-21 | End: 2022-03-22

## 2022-03-21 RX ORDER — LEVOTHYROXINE SODIUM 125 MCG
160 TABLET ORAL
Refills: 0 | Status: DISCONTINUED | OUTPATIENT
Start: 2022-03-21 | End: 2022-03-24

## 2022-03-21 RX ORDER — OXYTOCIN 10 UNIT/ML
333.33 VIAL (ML) INJECTION
Qty: 20 | Refills: 0 | Status: DISCONTINUED | OUTPATIENT
Start: 2022-03-21 | End: 2022-03-21

## 2022-03-21 RX ORDER — SODIUM CHLORIDE 9 MG/ML
1000 INJECTION, SOLUTION INTRAVENOUS
Refills: 0 | Status: DISCONTINUED | OUTPATIENT
Start: 2022-03-21 | End: 2022-03-21

## 2022-03-21 RX ORDER — FAMOTIDINE 10 MG/ML
20 INJECTION INTRAVENOUS ONCE
Refills: 0 | Status: COMPLETED | OUTPATIENT
Start: 2022-03-21 | End: 2022-03-21

## 2022-03-21 RX ORDER — NALBUPHINE HYDROCHLORIDE 10 MG/ML
2.5 INJECTION, SOLUTION INTRAMUSCULAR; INTRAVENOUS; SUBCUTANEOUS EVERY 6 HOURS
Refills: 0 | Status: DISCONTINUED | OUTPATIENT
Start: 2022-03-21 | End: 2022-03-26

## 2022-03-21 RX ORDER — IPRATROPIUM/ALBUTEROL SULFATE 18-103MCG
3 AEROSOL WITH ADAPTER (GRAM) INHALATION EVERY 6 HOURS
Refills: 0 | Status: DISCONTINUED | OUTPATIENT
Start: 2022-03-21 | End: 2022-03-22

## 2022-03-21 RX ORDER — SODIUM CHLORIDE 9 MG/ML
1000 INJECTION, SOLUTION INTRAVENOUS
Refills: 0 | Status: DISCONTINUED | OUTPATIENT
Start: 2022-03-21 | End: 2022-03-23

## 2022-03-21 RX ORDER — ACETAMINOPHEN 500 MG
1000 TABLET ORAL EVERY 6 HOURS
Refills: 0 | Status: COMPLETED | OUTPATIENT
Start: 2022-03-21 | End: 2022-03-22

## 2022-03-21 RX ORDER — RIZATRIPTAN BENZOATE 5 MG/1
1 TABLET ORAL
Qty: 0 | Refills: 0 | DISCHARGE

## 2022-03-21 RX ORDER — HYDROMORPHONE HYDROCHLORIDE 2 MG/ML
0.5 INJECTION INTRAMUSCULAR; INTRAVENOUS; SUBCUTANEOUS ONCE
Refills: 0 | Status: DISCONTINUED | OUTPATIENT
Start: 2022-03-21 | End: 2022-03-21

## 2022-03-21 RX ORDER — MORPHINE SULFATE 50 MG/1
0.2 CAPSULE, EXTENDED RELEASE ORAL ONCE
Refills: 0 | Status: DISCONTINUED | OUTPATIENT
Start: 2022-03-21 | End: 2022-03-21

## 2022-03-21 RX ORDER — MORPHINE SULFATE 50 MG/1
4 CAPSULE, EXTENDED RELEASE ORAL ONCE
Refills: 0 | Status: DISCONTINUED | OUTPATIENT
Start: 2022-03-21 | End: 2022-03-21

## 2022-03-21 RX ORDER — ERTAPENEM SODIUM 1 G/1
1000 INJECTION, POWDER, LYOPHILIZED, FOR SOLUTION INTRAMUSCULAR; INTRAVENOUS EVERY 24 HOURS
Refills: 0 | Status: DISCONTINUED | OUTPATIENT
Start: 2022-03-21 | End: 2022-03-21

## 2022-03-21 RX ORDER — DEXAMETHASONE 0.5 MG/5ML
4 ELIXIR ORAL EVERY 6 HOURS
Refills: 0 | Status: DISCONTINUED | OUTPATIENT
Start: 2022-03-21 | End: 2022-03-21

## 2022-03-21 RX ORDER — PANTOPRAZOLE SODIUM 20 MG/1
1 TABLET, DELAYED RELEASE ORAL
Qty: 0 | Refills: 0 | DISCHARGE

## 2022-03-21 RX ORDER — NALOXONE HYDROCHLORIDE 4 MG/.1ML
0.1 SPRAY NASAL
Refills: 0 | Status: DISCONTINUED | OUTPATIENT
Start: 2022-03-21 | End: 2022-03-26

## 2022-03-21 RX ORDER — CHLORHEXIDINE GLUCONATE 213 G/1000ML
1 SOLUTION TOPICAL ONCE
Refills: 0 | Status: DISCONTINUED | OUTPATIENT
Start: 2022-03-21 | End: 2022-03-21

## 2022-03-21 RX ADMIN — OXYCODONE HYDROCHLORIDE 10 MILLIGRAM(S): 5 TABLET ORAL at 19:54

## 2022-03-21 RX ADMIN — Medication 25 GRAM(S): at 15:28

## 2022-03-21 RX ADMIN — Medication 3 MILLILITER(S): at 23:34

## 2022-03-21 RX ADMIN — SODIUM CHLORIDE 125 MILLILITER(S): 9 INJECTION, SOLUTION INTRAVENOUS at 19:24

## 2022-03-21 RX ADMIN — Medication 160 MICROGRAM(S): at 23:19

## 2022-03-21 RX ADMIN — MORPHINE SULFATE 4 MILLIGRAM(S): 50 CAPSULE, EXTENDED RELEASE ORAL at 23:36

## 2022-03-21 RX ADMIN — Medication 400 MILLIGRAM(S): at 17:00

## 2022-03-21 RX ADMIN — ERTAPENEM SODIUM 120 MILLIGRAM(S): 1 INJECTION, POWDER, LYOPHILIZED, FOR SOLUTION INTRAMUSCULAR; INTRAVENOUS at 17:46

## 2022-03-21 RX ADMIN — OXYCODONE HYDROCHLORIDE 10 MILLIGRAM(S): 5 TABLET ORAL at 19:24

## 2022-03-21 RX ADMIN — Medication 3 MILLILITER(S): at 17:26

## 2022-03-21 RX ADMIN — FAMOTIDINE 20 MILLIGRAM(S): 10 INJECTION INTRAVENOUS at 06:39

## 2022-03-21 RX ADMIN — Medication 1000 MILLIGRAM(S): at 23:45

## 2022-03-21 RX ADMIN — CHLORHEXIDINE GLUCONATE 1 APPLICATION(S): 213 SOLUTION TOPICAL at 15:28

## 2022-03-21 RX ADMIN — HYDROMORPHONE HYDROCHLORIDE 0.5 MILLIGRAM(S): 2 INJECTION INTRAMUSCULAR; INTRAVENOUS; SUBCUTANEOUS at 21:08

## 2022-03-21 RX ADMIN — PANTOPRAZOLE SODIUM 40 MILLIGRAM(S): 20 TABLET, DELAYED RELEASE ORAL at 15:28

## 2022-03-21 RX ADMIN — HYDROMORPHONE HYDROCHLORIDE 0.5 MILLIGRAM(S): 2 INJECTION INTRAMUSCULAR; INTRAVENOUS; SUBCUTANEOUS at 18:00

## 2022-03-21 RX ADMIN — SODIUM CHLORIDE 2000 MILLILITER(S): 9 INJECTION, SOLUTION INTRAVENOUS at 06:30

## 2022-03-21 RX ADMIN — HYDROMORPHONE HYDROCHLORIDE 0.5 MILLIGRAM(S): 2 INJECTION INTRAMUSCULAR; INTRAVENOUS; SUBCUTANEOUS at 16:05

## 2022-03-21 RX ADMIN — Medication 400 MILLIGRAM(S): at 23:15

## 2022-03-21 RX ADMIN — HYDROMORPHONE HYDROCHLORIDE 0.5 MILLIGRAM(S): 2 INJECTION INTRAMUSCULAR; INTRAVENOUS; SUBCUTANEOUS at 16:20

## 2022-03-21 RX ADMIN — SODIUM CHLORIDE 125 MILLILITER(S): 9 INJECTION, SOLUTION INTRAVENOUS at 14:30

## 2022-03-21 RX ADMIN — HYDROMORPHONE HYDROCHLORIDE 0.5 MILLIGRAM(S): 2 INJECTION INTRAMUSCULAR; INTRAVENOUS; SUBCUTANEOUS at 21:38

## 2022-03-21 RX ADMIN — CHLORHEXIDINE GLUCONATE 1 APPLICATION(S): 213 SOLUTION TOPICAL at 23:37

## 2022-03-21 RX ADMIN — Medication 15 MILLILITER(S): at 06:27

## 2022-03-21 RX ADMIN — HYDROMORPHONE HYDROCHLORIDE 0.5 MILLIGRAM(S): 2 INJECTION INTRAMUSCULAR; INTRAVENOUS; SUBCUTANEOUS at 17:46

## 2022-03-21 NOTE — OB PROVIDER H&P - ASSESSMENT
A/P: 36y  @ 34w1d, h/o myomectomy and C/S x1 with PNC c/b anterior placenta previa and placenta accreta syndrome, presents for a scheduled  hysterectomy.    -Routine labs   -Active T&S  -2u pRBC on hold   -Anesthesia for consent     Dr. Yousif at bedside for consent  Dot Hawkins PGY-4

## 2022-03-21 NOTE — BRIEF OPERATIVE NOTE - OPERATION/FINDINGS
fundal incision made and baby delivered in breech position delayed cord clamping, handed to NICU team  complete anterior accreta noted extended to cervical canal . Normal bilateral fallopian tubes  total abdominal hysterectomy, bilateral salpingectomy, cystoscopy, bilateral ureterolysis, bilateral uterine artery ligation performed

## 2022-03-21 NOTE — OB PROVIDER DELIVERY SUMMARY - NSSELHIDDEN_OBGYN_ALL_OB_FT
[NS_DeliveryAttending1_OBGYN_ALL_OB_FT:[id number]],[NS_DeliveryAttending2_OBGYN_ALL_OB_FT:[id number]],[NS_DeliveryAssist1_OBGYN_ALL_OB_FT:[id number]],[NS_DeliveryRN_OBGYN_ALL_OB_FT:[id number]]

## 2022-03-21 NOTE — CHART NOTE - NSCHARTNOTEFT_GEN_A_CORE
Patient seen and examined at bedside, recently post-op. No acute complaints at this time besides appropriate incision pain - pt intubated and responses limited. Pt not on any pressors or sedation. VSS as below    Vital Signs Last 24 Hours  T(C): 36.1 (22 @ 14:00), Max: 36.5 (22 @ 06:00)  HR: 69 (22 @ 16:45) (67 - 100)  BP: 117/69 (22 @ 14:00) (109/67 - 117/69)  RR: 11 (22 @ 16:45) (10 - 33)  SpO2: 100% (22 @ 16:45) (98% - 100%)    I&O's Summary    21 Mar 2022 07:01  -  21 Mar 2022 17:00  --------------------------------------------------------  IN: 448.6 mL / OUT: 1100 mL / NET: -651.4 mL      Physical Exam:  General: NAD  CV: RRR  Lungs: intubated, comfortable  Abdomen: soft, appropriately tender, obese, softly distended, pressure dressing over midline incision, binder in place  Incision: pressure dressing in place   Ext: no pain or swelling, SCDs in place     Labs:             10.0<L>  9.43  )-----------( 67<L>    (  @ 14:17 )             29.3<L>               12.6   24.04<H> )-----------( 161      (  @ 10:18 )             38.6                10.4<L>  9.99  )-----------( 193      (  @ 13:32 )             32.7<L>        MEDICATIONS  (STANDING):  acetaminophen   IVPB .. 1000 milliGRAM(s) IV Intermittent every 6 hours  albuterol/ipratropium for Nebulization 3 milliLiter(s) Nebulizer every 6 hours  chlorhexidine 2% Cloths 1 Application(s) Topical daily  chlorhexidine 4% Liquid 1 Application(s) Topical <User Schedule>  ertapenem  IVPB 1000 milliGRAM(s) IV Intermittent every 24 hours  levothyroxine Injectable 160 MICROGram(s) IV Push <User Schedule>  levothyroxine Injectable 80 MICROGram(s) IV Push <User Schedule>  multiple electrolytes Injection Type 1 1000 milliLiter(s) (125 mL/Hr) IV Continuous <Continuous>  pantoprazole  Injectable 40 milliGRAM(s) IV Push daily    MEDICATIONS  (PRN):  HYDROmorphone  Injectable 0.5 milliGRAM(s) IV Push every 3 hours PRN pain  nalbuphine Injectable 2.5 milliGRAM(s) IV Push every 6 hours PRN Pruritus  naloxone Injectable 0.1 milliGRAM(s) IV Push every 3 minutes PRN For ANY of the following changes in patient status:  A. Breaths Per Minute LESS THAN 10, B. Oxygen saturation LESS THAN 90%, C. Sedation score of 6 for Stop After: 4 Times  ondansetron Injectable 4 milliGRAM(s) IV Push every 6 hours PRN Nausea  sodium chloride 0.9% lock flush 10 milliLiter(s) IV Push every 1 hour PRN Pre/post blood products, medications, blood draw, and to maintain line patency      Vito is a 36y POD#0 s/p  hysterectomy, bilateral salpingectomy, bialteral ureterolysis and cysto.    #Neuro: pain well controlled w/ current regimen  #CV: Hemodynamically stable off pressors, status post 3 u PRBC, 12U FFP, 2 cyro, 2 plt, H/H currently 10.0/29.3  #Pulm: ROS and PE unremarkable, currently intubated, to be extubated soon per primary team   #ID: Afebrile, no signs or sx of infection, on Invanz  #GI: NPO. Zofran/Simethicone/Senna prn  #: Funk in place, clear yellow UOP    #FEN: LR @ 125  #Heme: SCDs while in bed, and early ambulation encouraged for DVT prophylaxis    Hattie March MD  GYN PGY3

## 2022-03-21 NOTE — OB NEONATOLOGY/PEDIATRICIAN DELIVERY SUMMARY - NSPEDSNEONOTESA_OBGYN_ALL_OB_FT
Requested by Dr. Pete to attend delivery of 34 1/7 week female infant born via repeat scheduled c/s to a 37yo  mother who is A pos, prenatal labs neg/NR/Imm, GBS unknown. BMZ x 2 doses one week prior to delivery. Maternal h/o anxiety/depression (no meds), hypothyroidism on synthroid, myomectomy x4 and C/S x1. Pregnancy complicated by anterior placenta previa and placenta accreta. ROM clear at delivery, infant in vertex position and delivered footling breech and emerged with spontaneous cry. Routine resuscitation provided. Apgars 9/9. Strong cry, pink, active. Stable infant to be admitted to NICU for prematurity. Mother wishes to exclusively breastfeed.
Satisfactory

## 2022-03-21 NOTE — BRIEF OPERATIVE NOTE - COMMENTS
stable, intubated, massey in place  bilateral side walls opened and fibrillar/ vistaseal placed  surgicell placed over vaginal cuff stable, intubated, massey in place  bilateral side walls opened and fibrillar/ vistaseal placed  surgicell placed over vaginal cuff    DICTATION#: [id number]

## 2022-03-21 NOTE — PATIENT PROFILE ADULT - FALL HARM RISK - UNIVERSAL INTERVENTIONS
Bed in lowest position, wheels locked, appropriate side rails in place/Call bell, personal items and telephone in reach/Instruct patient to call for assistance before getting out of bed or chair/Non-slip footwear when patient is out of bed/[place] to call system/Physically safe environment - no spills, clutter or unnecessary equipment/Purposeful Proactive Rounding/Room/bathroom lighting operational, light cord in reach

## 2022-03-21 NOTE — OB PROVIDER H&P - HISTORY OF PRESENT ILLNESS
36y  @ 34w1d, h/o myomectomy and C/S x1 with PNC c/b anterior placenta previa and placenta accreta syndrome, presents for a scheduled  hysterectomy. PAS confirmed on MRI performed 2021. Patient has no complaints today. Endorses FM. Denies VB, ctx, LOF.     ATU sono (3/7): vertex, efw 1834g (28%), JADA 19, BPP     GSB unknown    OBHx: 2017 pLTCS due to h/o of multiple uterine surgeries c/b focal accreta (performed at [facility], Op note not currently available for review) 5#15. Cervical shortening also noted in 3rd trimester   GynHx: [facility] myomectomy x2  (Dr. Mcbride, [place]), D&C/hysteroscopy with myomectomy x2 , D&C/hysteroscopy for Ashermann's   PMHx: hypothyroidism, ashtma  PSHx: as above  Med: albuterol prn, omperazole 40mg qd, synthroid 200mcg x6 days and 100mcg x1 day (), , Claritin, protonix 40mg qAM, vitamins  All: NKDA  SH: neg x3  PsychH: anxiety/depression (follows with a therapist)

## 2022-03-21 NOTE — OB PROVIDER DELIVERY SUMMARY - NSPROVIDERDELIVERYNOTE_OBGYN_ALL_OB_FT
This was a scheduled  hysterectomy due to known placenta accreta syndrome.   Patient also had a known complete placenta previa.     Delivery of viable female infant, apgars 9, 9, cephalic presentation.   Incision made at the fundus and fetus was delivered breech.   Placenta left in situ. Hysterotomy reapproximated and total abdominal hysterectomy performed.   See operative note for details.   Status post TXA x1 intra-operatively.  Patient received Vancomycin and flagyl intra-operatively (h/o c-diff).     EBL = 6000ml; IVF = 5500m;, Blood products = 13u pRBC, 12u FFP, 2u plts, 2u cryo;  UOP = 2400ml

## 2022-03-21 NOTE — OB PROVIDER H&P - NSMATERNALFETALCONCERNS_OBGYN_ALL_OB_FT
Maternal Alert  3/18/22 - Maternal MDM held 3/9/22.  See detailed minutes.  Suspected placenta accreta.  Plan for  hysterectomy in the Main OR. -Monie Rizo, MARLAC

## 2022-03-21 NOTE — CONSULT NOTE ADULT - SUBJECTIVE AND OBJECTIVE BOX
SICU Consultation Note  =====================================================  HPI:  35yo F  with h/o asthma, hypothyroidism, laparoscopic myomectomy and prior C/S presenting for a scheduled  hysterectomy for a known placenta accreta. Patient had massive blood loss of 6L during the case requiring MTP.    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  Hypothyroid  Migraines  Asthma - last exacerbation 2022  Hyperlipidemia  Female infertility  Chronic seasonal allergic rhinitis  Depression  Anxiety  COVID-19 virus infection - with SOB, fever, c-diff diarrhea (treated), but no pneumonia, no hospitalization, 2020  Placenta accreta, unspecified trimester  H/O  section -   Egg donor, under age 35, designated recipient - egg retrieval x2 ( &amp; )  S/P tendon repair - left foot ~  S/P tonsillectomy ~    HOME MEDS:  Aspir 81 oral delayed release tablet: 2 tab(s) orally once a day (21 Mar 2022 06:35)  Claritin 10 mg oral tablet: 1 tab(s) orally once a day (21 Mar 2022 06:35)  levothyroxine 100 mcg (0.1 mg) oral tablet: 1 tab(s) orally once a day on  (21 Mar 2022 06:35)  levothyroxine 200 mcg (0.2 mg) oral tablet: 1 tab(s) orally once a day Monday-Saturday (21 Mar 2022 06:35)  Pepcid 40 mg oral tablet: 1 tab(s) orally once a day (at bedtime) (21 Mar 2022 06:35)  Prena1 oral capsule: 1 cap(s) orally once a day (21 Mar 2022 06:35)  ProAir HFA 90 mcg/inh inhalation aerosol: 2 puff(s) inhaled every 6 hours, As Needed (21 Mar 2022 06:35)  Protonix 40 mg oral delayed release tablet: 1 tab(s) orally once a day (21 Mar 2022 06:35)  Rhinocort 32 mcg/inh nasal aerosol with adapter: nasal once a day (21 Mar 2022 06:35)  rizatriptan 5 mg oral tablet: 1 tab(s) orally once a day, As Needed (21 Mar 2022 06:35)  Vitamin D3 2000 intl units (50 mcg) oral tablet: 2 tab(s) orally once a day (21 Mar 2022 06:35)    ALLERGIES:  Allergies    No Known Drug Allergies  shellfish (Hives; Swelling)    SOCIAL:   ADVANCED DIRECTIVES: Presumed Full Code     REVIEW OF SYSTEMS:  [] A ten-point review of systems was otherwise negative except as noted.  [x] Due to altered mental status/intubation, subjective information were not able to be obtained from the patient. History was obtained, to the extent possible, from review of the chart and collateral sources of information.      CURRENT MEDICATIONS:   --------------------------------------------------------------------------------------  Neurologic Medications  HYDROmorphone  Injectable 0.5 milliGRAM(s) IV Push every 3 hours PRN pain  propofol Infusion 75 MICROgram(s)/kG/Min IV Continuous <Continuous>    Respiratory Medications    Cardiovascular Medications    Gastrointestinal Medications  multiple electrolytes Injection Type 1 1000 milliLiter(s) IV Continuous <Continuous>  pantoprazole  Injectable 40 milliGRAM(s) IV Push daily    Genitourinary Medications    Hematologic/Oncologic Medications    Antimicrobial/Immunologic Medications  cefoTEtan  IVPB 2 Gram(s) IV Intermittent once  ertapenem Injectable 1000 milliGRAM(s) IntraMuscular every 24 hours    Endocrine/Metabolic Medications    Topical/Other Medications  chlorhexidine 0.12% Liquid 15 milliLiter(s) Oral Mucosa every 12 hours  chlorhexidine 2% Cloths 1 Application(s) Topical daily    --------------------------------------------------------------------------------------    VITAL SIGNS, INS/OUTS (last 24 hours):  --------------------------------------------------------------------------------------  ICU Vital Signs Last 24 Hrs  T(C): 36.1 (21 Mar 2022 13:57), Max: 36.5 (21 Mar 2022 06:00)  T(F): 97 (21 Mar 2022 13:57), Max: 97.7 (21 Mar 2022 06:00)  HR: 71 (21 Mar 2022 13:57) (71 - 100)  BP: 109/67 (21 Mar 2022 07:27) (109/67 - 109/67)  BP(mean): --  ABP: 135/64 (21 Mar 2022 13:57) (135/64 - 135/64)  ABP(mean): 95 (21 Mar 2022 13:57) (95 - 95)  RR: 17 (21 Mar 2022 13:57) (16 - 17)  SpO2: 100% (21 Mar 2022 13:57) (98% - 100%)    I&O's Summary    --------------------------------------------------------------------------------------    EXAM:    General/Neuro  RASS: -1  Exam: sedated    Respiratory  Exam: Lungs clear to auscultation, Normal expansion/effort.   [] Tracheostomy   [x] Intubated  Mechanical Ventilation: 450/18/5/50    Cardiovascular  Exam: S1, S2.  Regular rate and rhythm.  No peripheral edema    Cardiac Rhythm: Normal Sinus Rhythm    GI  Exam: Abdomen soft, Non-distended.    Wound: midline laparotomy incision with dressing c/d/i, binder in place  Current Diet:     Tubes/Lines/Drains   [x] Peripheral IV  [x] Central Venous Line     	[x] R	[] L	[x] IJ	[] Fem	[] SC        Type: double lumen cordis	    Date Placed: 3/21/2022  [x] Arterial Line		[] R	[x] L	[] Fem	[x] Rad	[] Ax	Date Placed: 3/21/2022  [] PICC:         	[] Midline		[] Mediport           [x] Urinary Catheter		Date Placed: 3/21    Extremities  Exam: Extremities warm, pink, well-perfused.      Derm:  Exam: Good skin turgor, no skin breakdown.      :   Exam: Funk catheter in place.     --------------------------------------------------------------------------------------  Labs:  CAPILLARY BLOOD GLUCOSE                              12.6   24.04 )-----------( 161      ( 21 Mar 2022 10:18 )             38.6             135  |  111<H>  |  7   ----------------------------<  271<H>  6.8<HH>   |  14<L>  |  0.53      Calcium, Total Serum: 8.8 mg/dL (22 @ 10:18)      LFTs:     Blood Gas Arterial, Lactate: 3.9 mmol/L (22 @ 12:48)  Blood Gas Arterial, Lactate: 4.0 mmol/L (22 @ 12:06)  Blood Gas Arterial, Lactate: 3.8 mmol/L (22 @ 11:40)  Blood Gas Arterial, Lactate: 3.8 mmol/L (22 @ 11:06)  Blood Gas Arterial, Lactate: 4.1 mmol/L (22 @ 10:51)  Blood Gas Arterial, Lactate: 4.1 mmol/L (22 @ 10:38)    ABG - ( 21 Mar 2022 12:48 )  pH: 7.43  /  pCO2: 32    /  pO2: 374   / HCO3: 21    / Base Excess: -2.5  /  SaO2: 99.7            ABG - ( 21 Mar 2022 12:06 )  pH: 7.43  /  pCO2: 33    /  pO2: 355   / HCO3: 22    / Base Excess: -2.1  /  SaO2: 100.0           ABG - ( 21 Mar 2022 11:40 )  pH: 7.41  /  pCO2: 30    /  pO2: 348   / HCO3: 19    / Base Excess: -5.1  /  SaO2: 100.0             Coags:     13.1   ----< 1.13    ( 21 Mar 2022 10:17 )     24.9

## 2022-03-21 NOTE — OB PROVIDER DELIVERY SUMMARY - NSPPHRISKEVAL_OBGYN_ALL_OB
Placenta accreta, confirmed or suspected/Placenta previa, confirmed or suspected/Prior , uterine surgery, or multiple laparotomies/Hematocrit < 30%

## 2022-03-21 NOTE — AIRWAY REMOVAL NOTE  ADULT & PEDS - ARTIFICAL AIRWAY REMOVAL COMMENTS
Written order for extubation verified.The patient was identified by full name and birth date compared to the identification band. Present during the procedure was Leyda Mcgraw)

## 2022-03-21 NOTE — CONSULT NOTE ADULT - ASSESSMENT
37yo F  with h/o asthma, hypothyroidism, laparoscopic myomectomy and prior C/S presenting for a scheduled  hysterectomy for a known placenta accreta now s/p exlap,  hysterectomy, bilateral salpingectomy, ureterolysis and cystoscopy complicated by hemorrhage of 6L requiring MTP. Patient transferred to SICU intubated off pressors.    NEURO: h/o anxiety  - Sedated with propofol -> will wean off  - Pain control with IV tylenol and dilaudid    RESP: h/o asthma  - Intubated 450/18/5/50  - STAT CXR to verify ETT position and confirm central line  - Inhalers PRN once extubated    CV: no active issues  - MAP goal >65  - Required levo/helen gtt during OR, now weaned off  - Lactate elevated, will continue to trend and resuscitate appropriately    GI:   - Diet: NPO   - C/w home protonix    :  - Trend electrolytes  - Monitor UOP, massey in place  - Plasmalyte @ 125ml/hr    ID:   - Trend WBC, fever curve  - s/p vanco and flagyl in OR (h/o C. diff)  - Invanz ppx    HEME: hemorrhage  - Total 13u PRBC, 12u FFP, 2u cryo, 2u plt, TXA 1  - Ok for DVT ppx with SQH when H&H stable  - Check stat CBC, coags, fibrinogen  - Trend labs q4h    ENDO: h/o hypothyroidism  - Monitor glucose on BMP  - C/w home synthroid (IV dose)    TUBES/LINES/DRAINS:  - RIJ double lumen cordis  - 18G PIV x2  - L radial a-line  - 5 Algerian LUE    DISPO: SICU  37yo F  with h/o asthma, hypothyroidism, laparoscopic myomectomy and prior C/S presenting for a scheduled  hysterectomy for a known placenta accreta now s/p exlap,  hysterectomy, bilateral salpingectomy, ureterolysis and cystoscopy complicated by hemorrhage of 6L requiring MTP. Patient transferred to SICU intubated off pressors.    NEURO: h/o anxiety  - Sedated with propofol -> will wean off  - Pain control with IV tylenol and dilaudid    RESP: h/o asthma  - Intubated 450/18/5/50  - STAT CXR to verify ETT position and confirm central line  - Inhalers PRN once extubated    CV: no active issues  - MAP goal >65  - Required levo/helen gtt during OR, now weaned off  - Lactate elevated, will continue to trend and resuscitate appropriately    GI:   - Diet: NPO   - C/w home protonix    :  - Trend electrolytes  - Monitor UOP, massey in place  - Plasmalyte @ 125ml/hr  - Pad counts to assess for bleeding from vaginal cuff    ID:   - Trend WBC, fever curve  - s/p vanco and flagyl in OR (h/o C. diff)  - Invanz ppx    HEME: hemorrhage  - Total 13u PRBC, 12u FFP, 2u cryo, 2u plt, TXA 1  - Ok for DVT ppx with SQH when H&H stable  - Check stat CBC, coags, fibrinogen  - Trend labs q4h    ENDO: h/o hypothyroidism  - Monitor glucose on BMP  - C/w home synthroid (IV dose)    TUBES/LINES/DRAINS:  - RIJ double lumen cordis  - 18G PIV x2  - R radial a-line  - 5 Telugu LUE    DISPO: SICU

## 2022-03-21 NOTE — OB PROVIDER H&P - NSPPHRISKEVAL_OBGYN_ALL_OB
Placenta accreta, confirmed or suspected/Placenta previa, confirmed or suspected/Prior , uterine surgery, or multiple laparotomies

## 2022-03-21 NOTE — OB RN DELIVERY SUMMARY - NSSELHIDDEN_OBGYN_ALL_OB_FT
[[email or web address]_OB_FT:[id number]],[NS_[email or web address]_ALL_OB_FT:[id number]_DeliveryAssist1_OBGYN_ALL_OB_FT:[id number]],[NS_DeliveryRN_OBGYN_ALL_OB_FT:[id number]]

## 2022-03-21 NOTE — PRE-ANESTHESIA EVALUATION ADULT - NSANTHOBSERVEDRD_ENT_A_CORE
Labs collected per order. Drawn from right [facility] using 23G butterfly x1 attempt. Site covered w/ gauze and coban. Appeared to tolerate well. Patient discharged to waiting area for MD davis. No

## 2022-03-21 NOTE — BRIEF OPERATIVE NOTE - NSICDXBRIEFPROCEDURE_GEN_ALL_CORE_FT
PROCEDURES:   hysterectomy 21-Mar-2022 13:17:11  Lindsey Yousif  Bilateral salpingectomy 21-Mar-2022 13:17:18  Lindsey Yousif  Bilateral ureterolysis 21-Mar-2022 13:17:29  Lindsey Yousif  Cystoscopy 21-Mar-2022 13:17:46  Lindsey Yousif

## 2022-03-21 NOTE — CONSULT NOTE ADULT - ATTENDING COMMENTS
35yo F  with h/o asthma, hypothyroidism, laparoscopic myomectomy and prior C/S presenting for a scheduled  hysterectomy for a known placenta accreta now s/p exlap,  hysterectomy, bilateral salpingectomy, ureterolysis and cystoscopy complicated by hemorrhage of 6L requiring MTP. Patient transferred to SICU intubated.  EBL 6L, transfused PRBC 13U, FFP 12U, Cryo 2U, Plt 2U, TXA 1    Sedated with Propofol drip gtt  Vent adj, CXR lines and tube in good position  Hemodynamically off pressure  NPO  IVF  Serial H.H, coags fibrinogen, stable so far  Will assess for extubation  Mechanical DVT ppx

## 2022-03-22 LAB
ALBUMIN SERPL ELPH-MCNC: 3.1 G/DL — LOW (ref 3.3–5)
ALP SERPL-CCNC: 44 U/L — SIGNIFICANT CHANGE UP (ref 40–120)
ALT FLD-CCNC: 13 U/L — SIGNIFICANT CHANGE UP (ref 10–45)
ANION GAP SERPL CALC-SCNC: 13 MMOL/L — SIGNIFICANT CHANGE UP (ref 5–17)
APTT BLD: 25.5 SEC — LOW (ref 27.5–35.5)
APTT BLD: 26.5 SEC — LOW (ref 27.5–35.5)
AST SERPL-CCNC: 24 U/L — SIGNIFICANT CHANGE UP (ref 10–40)
BILIRUB SERPL-MCNC: 0.8 MG/DL — SIGNIFICANT CHANGE UP (ref 0.2–1.2)
BUN SERPL-MCNC: 7 MG/DL — SIGNIFICANT CHANGE UP (ref 7–23)
CALCIUM SERPL-MCNC: 8.9 MG/DL — SIGNIFICANT CHANGE UP (ref 8.4–10.5)
CHLORIDE SERPL-SCNC: 100 MMOL/L — SIGNIFICANT CHANGE UP (ref 96–108)
CO2 SERPL-SCNC: 24 MMOL/L — SIGNIFICANT CHANGE UP (ref 22–31)
CREAT SERPL-MCNC: 0.67 MG/DL — SIGNIFICANT CHANGE UP (ref 0.5–1.3)
EGFR: 117 ML/MIN/1.73M2 — SIGNIFICANT CHANGE UP
FIBRINOGEN PPP-MCNC: 467 MG/DL — SIGNIFICANT CHANGE UP (ref 330–520)
FIBRINOGEN PPP-MCNC: 574 MG/DL — HIGH (ref 330–520)
GAS PNL BLDA: SIGNIFICANT CHANGE UP
GAS PNL BLDA: SIGNIFICANT CHANGE UP
GLUCOSE SERPL-MCNC: 113 MG/DL — HIGH (ref 70–99)
HCT VFR BLD CALC: 26.9 % — LOW (ref 34.5–45)
HGB BLD-MCNC: 9.2 G/DL — LOW (ref 11.5–15.5)
HGB BLD-MCNC: 9.4 G/DL — LOW (ref 11.5–15.5)
HGB BLD-MCNC: 9.6 G/DL — LOW (ref 11.5–15.5)
INR BLD: 1.02 RATIO — SIGNIFICANT CHANGE UP (ref 0.88–1.16)
INR BLD: 1.08 RATIO — SIGNIFICANT CHANGE UP (ref 0.88–1.16)
MAGNESIUM SERPL-MCNC: 1.7 MG/DL — SIGNIFICANT CHANGE UP (ref 1.6–2.6)
MCHC RBC-ENTMCNC: 29.2 PG — SIGNIFICANT CHANGE UP (ref 27–34)
MCHC RBC-ENTMCNC: 29.6 PG — SIGNIFICANT CHANGE UP (ref 27–34)
MCHC RBC-ENTMCNC: 30 PG — SIGNIFICANT CHANGE UP (ref 27–34)
MCHC RBC-ENTMCNC: 34.2 GM/DL — SIGNIFICANT CHANGE UP (ref 32–36)
MCHC RBC-ENTMCNC: 34.9 GM/DL — SIGNIFICANT CHANGE UP (ref 32–36)
MCHC RBC-ENTMCNC: 35.7 GM/DL — SIGNIFICANT CHANGE UP (ref 32–36)
MCV RBC AUTO: 84.1 FL — SIGNIFICANT CHANGE UP (ref 80–100)
MCV RBC AUTO: 84.6 FL — SIGNIFICANT CHANGE UP (ref 80–100)
MCV RBC AUTO: 85.4 FL — SIGNIFICANT CHANGE UP (ref 80–100)
NRBC # BLD: 0 /100 WBCS — SIGNIFICANT CHANGE UP (ref 0–0)
PHOSPHATE SERPL-MCNC: 5.6 MG/DL — HIGH (ref 2.5–4.5)
PLATELET # BLD AUTO: 67 K/UL — LOW (ref 150–400)
PLATELET # BLD AUTO: 72 K/UL — LOW (ref 150–400)
PLATELET # BLD AUTO: 76 K/UL — LOW (ref 150–400)
POTASSIUM SERPL-MCNC: 4.3 MMOL/L — SIGNIFICANT CHANGE UP (ref 3.5–5.3)
POTASSIUM SERPL-SCNC: 4.3 MMOL/L — SIGNIFICANT CHANGE UP (ref 3.5–5.3)
PROT SERPL-MCNC: 5.1 G/DL — LOW (ref 6–8.3)
PROTHROM AB SERPL-ACNC: 11.8 SEC — SIGNIFICANT CHANGE UP (ref 10.5–13.4)
PROTHROM AB SERPL-ACNC: 12.5 SEC — SIGNIFICANT CHANGE UP (ref 10.5–13.4)
RBC # BLD: 3.15 M/UL — LOW (ref 3.8–5.2)
RBC # BLD: 3.18 M/UL — LOW (ref 3.8–5.2)
RBC # BLD: 3.2 M/UL — LOW (ref 3.8–5.2)
RBC # FLD: 14.6 % — HIGH (ref 10.3–14.5)
RBC # FLD: 15.1 % — HIGH (ref 10.3–14.5)
RBC # FLD: 15.6 % — HIGH (ref 10.3–14.5)
SODIUM SERPL-SCNC: 137 MMOL/L — SIGNIFICANT CHANGE UP (ref 135–145)
WBC # BLD: 10.87 K/UL — HIGH (ref 3.8–10.5)
WBC # BLD: 8.3 K/UL — SIGNIFICANT CHANGE UP (ref 3.8–10.5)
WBC # BLD: 9.15 K/UL — SIGNIFICANT CHANGE UP (ref 3.8–10.5)
WBC # FLD AUTO: 10.87 K/UL — HIGH (ref 3.8–10.5)
WBC # FLD AUTO: 8.3 K/UL — SIGNIFICANT CHANGE UP (ref 3.8–10.5)
WBC # FLD AUTO: 9.15 K/UL — SIGNIFICANT CHANGE UP (ref 3.8–10.5)

## 2022-03-22 PROCEDURE — 99233 SBSQ HOSP IP/OBS HIGH 50: CPT

## 2022-03-22 RX ORDER — FENTANYL/BUPIVACAINE/NS/PF 2MCG/ML-.1
250 PLASTIC BAG, INJECTION (ML) INJECTION
Refills: 0 | Status: DISCONTINUED | OUTPATIENT
Start: 2022-03-22 | End: 2022-03-24

## 2022-03-22 RX ORDER — LORATADINE 10 MG/1
10 TABLET ORAL DAILY
Refills: 0 | Status: DISCONTINUED | OUTPATIENT
Start: 2022-03-22 | End: 2022-03-26

## 2022-03-22 RX ORDER — HEPARIN SODIUM 5000 [USP'U]/ML
5000 INJECTION INTRAVENOUS; SUBCUTANEOUS EVERY 12 HOURS
Refills: 0 | Status: DISCONTINUED | OUTPATIENT
Start: 2022-03-22 | End: 2022-03-26

## 2022-03-22 RX ORDER — ALBUTEROL 90 UG/1
1 AEROSOL, METERED ORAL EVERY 6 HOURS
Refills: 0 | Status: DISCONTINUED | OUTPATIENT
Start: 2022-03-22 | End: 2022-03-26

## 2022-03-22 RX ORDER — MORPHINE SULFATE 50 MG/1
4 CAPSULE, EXTENDED RELEASE ORAL ONCE
Refills: 0 | Status: DISCONTINUED | OUTPATIENT
Start: 2022-03-22 | End: 2022-03-22

## 2022-03-22 RX ORDER — FENTANYL/BUPIVACAINE/NS/PF 2MCG/ML-.1
5 PLASTIC BAG, INJECTION (ML) INJECTION
Refills: 0 | Status: DISCONTINUED | OUTPATIENT
Start: 2022-03-22 | End: 2022-03-24

## 2022-03-22 RX ORDER — PANTOPRAZOLE SODIUM 20 MG/1
40 TABLET, DELAYED RELEASE ORAL
Refills: 0 | Status: DISCONTINUED | OUTPATIENT
Start: 2022-03-22 | End: 2022-03-26

## 2022-03-22 RX ORDER — POLYETHYLENE GLYCOL 3350 17 G/17G
17 POWDER, FOR SOLUTION ORAL DAILY
Refills: 0 | Status: DISCONTINUED | OUTPATIENT
Start: 2022-03-22 | End: 2022-03-26

## 2022-03-22 RX ORDER — SENNA PLUS 8.6 MG/1
2 TABLET ORAL AT BEDTIME
Refills: 0 | Status: DISCONTINUED | OUTPATIENT
Start: 2022-03-22 | End: 2022-03-26

## 2022-03-22 RX ORDER — ACETAMINOPHEN 500 MG
1000 TABLET ORAL ONCE
Refills: 0 | Status: COMPLETED | OUTPATIENT
Start: 2022-03-22 | End: 2022-03-22

## 2022-03-22 RX ORDER — MAGNESIUM SULFATE 500 MG/ML
2 VIAL (ML) INJECTION ONCE
Refills: 0 | Status: COMPLETED | OUTPATIENT
Start: 2022-03-22 | End: 2022-03-22

## 2022-03-22 RX ORDER — ACETAMINOPHEN 500 MG
1000 TABLET ORAL EVERY 6 HOURS
Refills: 0 | Status: COMPLETED | OUTPATIENT
Start: 2022-03-22 | End: 2022-03-23

## 2022-03-22 RX ORDER — DEXAMETHASONE 0.5 MG/5ML
4 ELIXIR ORAL EVERY 6 HOURS
Refills: 0 | Status: DISCONTINUED | OUTPATIENT
Start: 2022-03-22 | End: 2022-03-24

## 2022-03-22 RX ORDER — DIPHENHYDRAMINE HCL 50 MG
25 CAPSULE ORAL EVERY 4 HOURS
Refills: 0 | Status: DISCONTINUED | OUTPATIENT
Start: 2022-03-22 | End: 2022-03-24

## 2022-03-22 RX ORDER — SIMETHICONE 80 MG/1
80 TABLET, CHEWABLE ORAL DAILY
Refills: 0 | Status: DISCONTINUED | OUTPATIENT
Start: 2022-03-22 | End: 2022-03-23

## 2022-03-22 RX ADMIN — Medication 400 MILLIGRAM(S): at 06:35

## 2022-03-22 RX ADMIN — HEPARIN SODIUM 5000 UNIT(S): 5000 INJECTION INTRAVENOUS; SUBCUTANEOUS at 18:34

## 2022-03-22 RX ADMIN — MORPHINE SULFATE 4 MILLIGRAM(S): 50 CAPSULE, EXTENDED RELEASE ORAL at 02:33

## 2022-03-22 RX ADMIN — Medication 250 MILLILITER(S): at 07:15

## 2022-03-22 RX ADMIN — Medication 250 MILLILITER(S): at 04:11

## 2022-03-22 RX ADMIN — Medication 400 MILLIGRAM(S): at 18:32

## 2022-03-22 RX ADMIN — SIMETHICONE 80 MILLIGRAM(S): 80 TABLET, CHEWABLE ORAL at 18:36

## 2022-03-22 RX ADMIN — Medication 160 MICROGRAM(S): at 21:22

## 2022-03-22 RX ADMIN — CHLORHEXIDINE GLUCONATE 1 APPLICATION(S): 213 SOLUTION TOPICAL at 06:35

## 2022-03-22 RX ADMIN — Medication 250 MILLILITER(S): at 20:01

## 2022-03-22 RX ADMIN — Medication 400 MILLIGRAM(S): at 11:50

## 2022-03-22 RX ADMIN — SODIUM CHLORIDE 75 MILLILITER(S): 9 INJECTION, SOLUTION INTRAVENOUS at 07:17

## 2022-03-22 RX ADMIN — SENNA PLUS 2 TABLET(S): 8.6 TABLET ORAL at 21:22

## 2022-03-22 RX ADMIN — POLYETHYLENE GLYCOL 3350 17 GRAM(S): 17 POWDER, FOR SOLUTION ORAL at 18:36

## 2022-03-22 RX ADMIN — CHLORHEXIDINE GLUCONATE 1 APPLICATION(S): 213 SOLUTION TOPICAL at 11:55

## 2022-03-22 RX ADMIN — Medication 25 GRAM(S): at 03:14

## 2022-03-22 RX ADMIN — ERTAPENEM SODIUM 120 MILLIGRAM(S): 1 INJECTION, POWDER, LYOPHILIZED, FOR SOLUTION INTRAMUSCULAR; INTRAVENOUS at 18:32

## 2022-03-22 RX ADMIN — SODIUM CHLORIDE 75 MILLILITER(S): 9 INJECTION, SOLUTION INTRAVENOUS at 00:27

## 2022-03-22 RX ADMIN — Medication 250 MILLILITER(S): at 19:12

## 2022-03-22 RX ADMIN — Medication 1000 MILLIGRAM(S): at 06:35

## 2022-03-22 RX ADMIN — MORPHINE SULFATE 4 MILLIGRAM(S): 50 CAPSULE, EXTENDED RELEASE ORAL at 02:03

## 2022-03-22 RX ADMIN — MORPHINE SULFATE 4 MILLIGRAM(S): 50 CAPSULE, EXTENDED RELEASE ORAL at 00:06

## 2022-03-22 NOTE — PHYSICAL THERAPY INITIAL EVALUATION ADULT - PERTINENT HX OF CURRENT PROBLEM, REHAB EVAL
Pt is a 35 y/o F  with h/o asthma, hypothyroidism, laparoscopic myomectomy and prior C/S presenting for a scheduled  hysterectomy for a known placenta accreta. Patient had massive blood loss of 6L during the case requiring MTP.

## 2022-03-22 NOTE — CHART NOTE - NSCHARTNOTEFT_GEN_A_CORE
Attending Note  Pt seen at bedside, central line removed. States she feels well, abdominal pain appropriate. Abd distended, moderately tender, no rebound, guarding. Funk in situ. VS stable. Tolerated clears. Denies flatus. Ambulating to bathroom. Denies any vaginal bleeding. Explained surgical findings in length.   Appreciate SICU care.  also at bedside.   Lindsey Yousif MD

## 2022-03-22 NOTE — PROGRESS NOTE ADULT - SUBJECTIVE AND OBJECTIVE BOX
R3 Antepartum Note, HD#2    Interval events: Patient seen and examined at bedside, overnight pt extubated & advanced to regular diet & PCEA initiated. Pt reports pain was previously suprapubic but following increased rate of UOP pain has subsided. No acute complaints currently, reports poor appetite but has been tolerating water w/o n/v. Pt denies VB, CP, SOB, N/V, fevers, or chills.    Vital Signs Last 24 Hours  T(C): 37 (03-22-22 @ 03:00), Max: 37 (03-22-22 @ 03:00)  HR: 83 (03-22-22 @ 07:00) (63 - 94)  BP: 117/69 (03-21-22 @ 14:00) (117/69 - 117/69)  RR: 48 (03-22-22 @ 07:00) (10 - 57)  SpO2: 94% (03-22-22 @ 07:00) (91% - 100%)      Physical Exam:  General: NAD  CV: RRR  Reps: breathing comfortably on NC (inserted overnight while sleeping - ?JOSE)  Abdomen: Soft, minmally tender to palpation, pressure dressing removed - honeycomb dressing in place (to be removed later today), steris in place C/D/I   Ext: SCDs in place No pain or swelling    NST reactive overnight    Labs:             9.4    9.15  )-----------( 72       ( 03-22 @ 06:27 )             26.9     03-22 @ 00:13    137  |  100  |  7   ----------------------------<  113  4.3   |  24  |  0.67    Ca    8.9      03-22 @ 00:13  Phos  5.6     03-22 @ 00:13  Mg     1.7     03-22 @ 00:13    TPro  5.1  /  Alb  3.1  /  TBili  0.8  /  DBili  x   /  AST  24  /  ALT  13  /  AlkPhos  44  03-22 @ 00:13    PT/INR - ( 03-22 @ 06:27 )   PT: 11.8 sec;   INR: 1.02 ratio    PTT - ( 03-22 @ 06:27 )  PTT:25.5 sec    Uric Acid: (03-22 @ 06:27)  --       Fibrinogen: (03-22 @ 06:27)  574      LDH: (03-22 @ 06:27)  --         MEDICATIONS  (STANDING):  acetaminophen   IVPB .. 1000 milliGRAM(s) IV Intermittent every 6 hours  albuterol/ipratropium for Nebulization 3 milliLiter(s) Nebulizer every 6 hours  chlorhexidine 2% Cloths 1 Application(s) Topical daily  chlorhexidine 4% Liquid 1 Application(s) Topical <User Schedule>  ertapenem  IVPB 1000 milliGRAM(s) IV Intermittent every 24 hours  fentanyl (3 MICROgram(s)/mL) + bupivacaine 0.01% in 0.9% Sodium Chloride PCEA 250 milliLiter(s) Epidural PCA Continuous  levothyroxine Injectable 160 MICROGram(s) IV Push <User Schedule>  levothyroxine Injectable 80 MICROGram(s) IV Push <User Schedule>  multiple electrolytes Injection Type 1 1000 milliLiter(s) (75 mL/Hr) IV Continuous <Continuous>  pantoprazole  Injectable 40 milliGRAM(s) IV Push daily    MEDICATIONS  (PRN):  dexAMETHasone  Injectable 4 milliGRAM(s) IV Push every 6 hours PRN Nausea  diphenhydrAMINE 25 milliGRAM(s) Oral every 4 hours PRN Pruritus  fentanyl (3 MICROgram(s)/mL) + bupivacaine 0.01% in 0.9% Sodium Chloride PCEA Rescue Clinician Bolus 5 milliLiter(s) Epidural every 15 minutes PRN Moderate Pain (4 - 6)  nalbuphine Injectable 2.5 milliGRAM(s) IV Push every 6 hours PRN Pruritus  naloxone Injectable 0.1 milliGRAM(s) IV Push every 3 minutes PRN For ANY of the following changes in patient status:  A. Breaths Per Minute LESS THAN 10, B. Oxygen saturation LESS THAN 90%, C. Sedation score of 6 for Stop After: 4 Times  ondansetron Injectable 4 milliGRAM(s) IV Push every 6 hours PRN Nausea   R3 Antepartum Note, HD#2    Interval events: Patient seen and examined at bedside, overnight pt extubated & advanced to regular diet & PCEA initiated. Pt reports pain was previously suprapubic but following increased rate of UOP pain has subsided. No acute complaints currently, reports poor appetite but has been tolerating water w/o n/v. Pt denies VB, CP, SOB, N/V, fevers, or chills.    Vital Signs Last 24 Hours  T(C): 37 (03-22-22 @ 03:00), Max: 37 (03-22-22 @ 03:00)  HR: 83 (03-22-22 @ 07:00) (63 - 94)  BP: 117/69 (03-21-22 @ 14:00) (117/69 - 117/69)  RR: 48 (03-22-22 @ 07:00) (10 - 57)  SpO2: 94% (03-22-22 @ 07:00) (91% - 100%)      Physical Exam:  General: NAD  CV: RRR  Reps: breathing comfortably on NC (inserted overnight while sleeping - ?JOSE)  Abdomen: Soft, minmally tender to palpation, pressure dressing removed - honeycomb dressing in place (to be removed later today), steris in place C/D/I   : massey in place draining clear urine   Ext: SCDs in place No pain or swelling      Labs:             9.4    9.15  )-----------( 72       ( 03-22 @ 06:27 )             26.9     03-22 @ 00:13    137  |  100  |  7   ----------------------------<  113  4.3   |  24  |  0.67    Ca    8.9      03-22 @ 00:13  Phos  5.6     03-22 @ 00:13  Mg     1.7     03-22 @ 00:13    TPro  5.1  /  Alb  3.1  /  TBili  0.8  /  DBili  x   /  AST  24  /  ALT  13  /  AlkPhos  44  03-22 @ 00:13    PT/INR - ( 03-22 @ 06:27 )   PT: 11.8 sec;   INR: 1.02 ratio    PTT - ( 03-22 @ 06:27 )  PTT:25.5 sec    Uric Acid: (03-22 @ 06:27)  --       Fibrinogen: (03-22 @ 06:27)  574      LDH: (03-22 @ 06:27)  --         MEDICATIONS  (STANDING):  acetaminophen   IVPB .. 1000 milliGRAM(s) IV Intermittent every 6 hours  albuterol/ipratropium for Nebulization 3 milliLiter(s) Nebulizer every 6 hours  chlorhexidine 2% Cloths 1 Application(s) Topical daily  chlorhexidine 4% Liquid 1 Application(s) Topical <User Schedule>  ertapenem  IVPB 1000 milliGRAM(s) IV Intermittent every 24 hours  fentanyl (3 MICROgram(s)/mL) + bupivacaine 0.01% in 0.9% Sodium Chloride PCEA 250 milliLiter(s) Epidural PCA Continuous  levothyroxine Injectable 160 MICROGram(s) IV Push <User Schedule>  levothyroxine Injectable 80 MICROGram(s) IV Push <User Schedule>  multiple electrolytes Injection Type 1 1000 milliLiter(s) (75 mL/Hr) IV Continuous <Continuous>  pantoprazole  Injectable 40 milliGRAM(s) IV Push daily    MEDICATIONS  (PRN):  dexAMETHasone  Injectable 4 milliGRAM(s) IV Push every 6 hours PRN Nausea  diphenhydrAMINE 25 milliGRAM(s) Oral every 4 hours PRN Pruritus  fentanyl (3 MICROgram(s)/mL) + bupivacaine 0.01% in 0.9% Sodium Chloride PCEA Rescue Clinician Bolus 5 milliLiter(s) Epidural every 15 minutes PRN Moderate Pain (4 - 6)  nalbuphine Injectable 2.5 milliGRAM(s) IV Push every 6 hours PRN Pruritus  naloxone Injectable 0.1 milliGRAM(s) IV Push every 3 minutes PRN For ANY of the following changes in patient status:  A. Breaths Per Minute LESS THAN 10, B. Oxygen saturation LESS THAN 90%, C. Sedation score of 6 for Stop After: 4 Times  ondansetron Injectable 4 milliGRAM(s) IV Push every 6 hours PRN Nausea

## 2022-03-22 NOTE — PROGRESS NOTE ADULT - SUBJECTIVE AND OBJECTIVE BOX
SICU Daily Progress Note    24 Hour Events:  - extubated, on room air  - advanced to regular diet  - fentanyl PCEA started overnight   - IVF decreased to 75cc/hr  - suprapubic discomfort and massey with clots, flushed with return of 200cc, subsequent bladder scan empty      SUBJECTIVE/ROS:  A ten-point review of systems was otherwise negative except as noted.      NEURO  Exam: awake, alert, oriented x4  Meds: acetaminophen   IVPB .. 1000 milliGRAM(s) IV Intermittent every 6 hours  diphenhydrAMINE 25 milliGRAM(s) Oral every 4 hours PRN Pruritus  fentanyl (3 MICROgram(s)/mL) + bupivacaine 0.01% in 0.9% Sodium Chloride PCEA 250 milliLiter(s) Epidural PCA Continuous  fentanyl (3 MICROgram(s)/mL) + bupivacaine 0.01% in 0.9% Sodium Chloride PCEA Rescue Clinician Bolus 5 milliLiter(s) Epidural every 15 minutes PRN Moderate Pain (4 - 6)  nalbuphine Injectable 2.5 milliGRAM(s) IV Push every 6 hours PRN Pruritus  ondansetron Injectable 4 milliGRAM(s) IV Push every 6 hours PRN Nausea      RESPIRATORY  RR: 28 (03-22-22 @ 02:00) (10 - 57)  SpO2: 95% (03-22-22 @ 02:00) (91% - 100%)  Wt(kg): --  Exam: clear to auscultation bilaterally coarse rhonchi in all lung fields  Mechanical Ventilation: Mode: CPAP with PS, RR (patient): 16, FiO2: 40, PEEP: 5, PS: 5, MAP: 7, PIP: 10  ABG - ( 22 Mar 2022 00:03 )  pH: 7.45  /  pCO2: 39    /  pO2: 125   / HCO3: 27    / Base Excess: 2.9   /  SaO2: 99.8    Lactate: x                Meds: albuterol/ipratropium for Nebulization 3 milliLiter(s) Nebulizer every 6 hours      CARDIOVASCULAR  HR: 87 (03-22-22 @ 02:00) (63 - 100)  BP: 117/69 (03-21-22 @ 14:00) (109/67 - 117/69)  BP(mean): 86 (03-21-22 @ 14:00) (86 - 86)  ABP: 106/61 (03-22-22 @ 02:00) (104/63 - 137/71)  ABP(mean): 78 (03-22-22 @ 02:00) (78 - 98)  Wt(kg): --  CVP(cm H2O): --      Exam: regular rate and rhythm  Cardiac Rhythm: sinus  Perfusion     [x]Adequate   [ ]Inadequate  Mentation   [x]Normal       [ ]Reduced  Extremities  [x]Warm         [ ]Cool  Volume Status [ ]Hypervolemic [x]Euvolemic [ ]Hypovolemic  Meds:     GI/NUTRITION  Exam: softly distended, suprapubic tenderness to palpation; midline incision with dressing c/d/i  Diet: regular  Meds: pantoprazole  Injectable 40 milliGRAM(s) IV Push daily      GENITOURINARY  I&O's Detail    03-21 @ 07:01  -  03-22 @ 03:50  --------------------------------------------------------  IN:    IV PiggyBack: 100 mL    IV PiggyBack: 100 mL    multiple electrolytes Injection Type 1.: 1350 mL    Oral Fluid: 150 mL    Propofol: 23.6 mL  Total IN: 1723.6 mL    OUT:    Indwelling Catheter - Urethral (mL): 1775 mL  Total OUT: 1775 mL    Total NET: -51.4 mL          03-22    137  |  100  |  7   ----------------------------<  113<H>  4.3   |  24  |  0.67    Ca    8.9      22 Mar 2022 00:13  Phos  5.6     03-22  Mg     1.7     03-22    TPro  5.1<L>  /  Alb  3.1<L>  /  TBili  0.8  /  DBili  x   /  AST  24  /  ALT  13  /  AlkPhos  44  03-22    [x] Massey catheter, indication: periop  Meds: multiple electrolytes Injection Type 1 1000 milliLiter(s) IV Continuous <Continuous>  sodium chloride 0.9% lock flush 10 milliLiter(s) IV Push every 1 hour PRN Pre/post blood products, medications, blood draw, and to maintain line patency      HEMATOLOGIC  Meds:   [ ] VTE Prophylaxis - held due to periop                        9.6    8.30  )-----------( 67       ( 22 Mar 2022 00:13 )             26.9     PT/INR - ( 22 Mar 2022 00:14 )   PT: 12.5 sec;   INR: 1.08 ratio         PTT - ( 22 Mar 2022 00:14 )  PTT:26.5 sec    INFECTIOUS DISEASES  T(C): 36.7 (03-21-22 @ 23:00), Max: 36.7 (03-21-22 @ 23:00)  Wt(kg): --  WBC Count: 8.30 K/uL (03-22 @ 00:13)  WBC Count: 9.17 K/uL (03-21 @ 18:04)  WBC Count: 9.43 K/uL (03-21 @ 14:17)  WBC Count: 24.04 K/uL (03-21 @ 10:18)    Recent Cultures:    Meds: ertapenem  IVPB 1000 milliGRAM(s) IV Intermittent every 24 hours      ENDOCRINE  Capillary Blood Glucose    Meds: dexAMETHasone  Injectable 4 milliGRAM(s) IV Push every 6 hours PRN  levothyroxine Injectable 160 MICROGram(s) IV Push <User Schedule>  levothyroxine Injectable 80 MICROGram(s) IV Push <User Schedule>      ACCESS DEVICES:  [x] Peripheral IV  [x] Central Venous Line	[ ] R	[ ] L	[ ] IJ	[ ] Fem	[ ] SC	Placed:   [ ] Arterial Line		[ ] R	[ ] L	[ ] Fem	[ ] Rad	[ ] Ax	Placed:   [ ] PICC:					[ ] Mediport  [ ] Urinary Catheter, Date Placed:   [ ] Necessity of urinary, arterial, and venous catheters discussed    OTHER MEDICATIONS:  chlorhexidine 2% Cloths 1 Application(s) Topical daily  chlorhexidine 4% Liquid 1 Application(s) Topical <User Schedule>  naloxone Injectable 0.1 milliGRAM(s) IV Push every 3 minutes PRN

## 2022-03-22 NOTE — CHART NOTE - NSCHARTNOTEFT_GEN_A_CORE
Patient seen and evaluated at bedside. Endorses suprapubic pain. Otherwise, incisional pain well controlled. Has tolerated PO fluids without nausea/vomiting. Denies fevers, chills, chest pain, SOB. Has not yet ambulated. Massey in place.     Vital Signs Last 24 Hrs  T(C): 36.7 (21 Mar 2022 23:00), Max: 36.7 (21 Mar 2022 23:00)  T(F): 98 (21 Mar 2022 23:00), Max: 98 (21 Mar 2022 23:00)  HR: 87 (22 Mar 2022 02:00) (63 - 100)  BP: 117/69 (21 Mar 2022 14:00) (109/67 - 117/69)  BP(mean): 86 (21 Mar 2022 14:00) (86 - 86)  RR: 28 (22 Mar 2022 02:00) (10 - 57)  SpO2: 95% (22 Mar 2022 02:00) (91% - 100%)    Gen: in NAD, resting comfortably  Abd: soft, mild suprapubic tenderness, midline vertical incision with bandage in place, c/d/i  Massey: clear, pink tinged urine  : no bleeding noted on pad  Ext: SCDs in place    A/P: 35yo  POD1 s/p RCS, Blaze, BS, bilateral ureterolysis, bilat uterine artery ligation, Cysto for known placenta accreta under GETA (EBL 6L) c/b MTP s/p 13u pRBC, 12u FFP, 2u Cryo, 2Plt, currently in SICU for closer postoperative hemodynamic monitoring. Patient is now extubated, currently HD stable with minimal vaginal bleeding and pain on exam.     -Labs per SICU  -Strict I/Os  -pad counts  -recommend flushing massey catheter or replacing with 18F to assist with passage of possible clots in bladder  -Okay to ADAT  -Massey until POD#2  -c/w Invanz (3/21-)  -Appreciate excellent SICU care    Pt seen and examined with Dr. Pete  D/w SICU team  RFrankel PGY3 Patient seen and evaluated at bedside. Endorses suprapubic pain. Otherwise, incisional pain well controlled. Has tolerated PO fluids without nausea/vomiting. Denies fevers, chills, chest pain, SOB. Has not yet ambulated. Massey in place.     Vital Signs Last 24 Hrs  T(C): 36.7 (21 Mar 2022 23:00), Max: 36.7 (21 Mar 2022 23:00)  T(F): 98 (21 Mar 2022 23:00), Max: 98 (21 Mar 2022 23:00)  HR: 87 (22 Mar 2022 02:00) (63 - 100)  BP: 117/69 (21 Mar 2022 14:00) (109/67 - 117/69)  BP(mean): 86 (21 Mar 2022 14:00) (86 - 86)  RR: 28 (22 Mar 2022 02:00) (10 - 57)  SpO2: 95% (22 Mar 2022 02:00) (91% - 100%)    Gen: in NAD, resting comfortably  Abd: soft, mild suprapubic tenderness, midline vertical incision with bandage in place, c/d/i  Massey: clear, pink tinged urine  : no bleeding noted on pad  Ext: SCDs in place    A/P: 35yo  POD1 s/p RCS, Ramez, BS, bilateral ureterolysis, bilat uterine artery ligation, Cysto for known placenta accreta under GETA (EBL 6L) c/b MTP s/p 13u pRBC, 12u FFP, 2u Cryo, 2Plt, currently in SICU for closer postoperative hemodynamic monitoring. Patient is now extubated, currently HD stable with minimal vaginal bleeding and pain on exam.     -Labs per SICU  -Strict I/Os  -pad counts  -recommend flushing massey catheter or replacing with 18F to assist with passage of possible clots in bladder  -Okay to ADAT  -Massey until POD#2  -c/w Invanz (3/21-)  -Appreciate excellent SICU care    Pt seen and examined with Dr. Pete  D/w SICU team  RFrankel PGY3      Attg add  agree with above  briefly 35yo POD1 s/p rCD, RAMEZ, Bilateral Salpingectomy, b/l uterolysis, b/l uterin eartery ligation, cysto for known placenta accreta s/p MTP. in SICU for post-op monitoring.  pt hemodynamically stable. labs per sicu. OOB to chair. massey until pod2. on invanz. adat.   appreciate excellent sicu care  hedy ferreira MD

## 2022-03-22 NOTE — PHYSICAL THERAPY INITIAL EVALUATION ADULT - PLANNED THERAPY INTERVENTIONS, PT EVAL
Stair Negotiation Training: GOAL- Patient will be able to negotiate up & down 1 flight of stairs independently with single railing, step to gait pattern, in 2 weeks./balance training/bed mobility training/gait training/strengthening

## 2022-03-22 NOTE — OCCUPATIONAL THERAPY INITIAL EVALUATION ADULT - PERTINENT HX OF CURRENT PROBLEM, REHAB EVAL
36y  now POD#1 status post rCS, RAMEZ, bilateral ureterolysis, bilateral uterine artery ligation, & cystoscopy under GETA for known placenta accreta and previa (EBL 6L), procedure complicated by  MTP, pt status post 13u pRBC, 12u FFP, 2u Cryo, & 2Plt with an appropriate response. She is remains in SICU for closer postoperative hemodynamic monitoring,

## 2022-03-22 NOTE — OCCUPATIONAL THERAPY INITIAL EVALUATION ADULT - DIAGNOSIS, OT EVAL
Pt presents with pain, decreased strength, endurance, and balance, all impacting ability to perform ADLs and functional mobility.

## 2022-03-22 NOTE — OCCUPATIONAL THERAPY INITIAL EVALUATION ADULT - LIVES WITH, PROFILE
Pt lives in a house with her  and daughter, few steps to enter, first floor set-up. PTA IND with ADL/mobility without AD.

## 2022-03-22 NOTE — PHYSICAL THERAPY INITIAL EVALUATION ADULT - MARITAL STATUS
Pt lives in a private house with spouse and daughter. Pt reports 3 SUNDAR with single railing, bedroom, bathroom on main floor./

## 2022-03-22 NOTE — OCCUPATIONAL THERAPY INITIAL EVALUATION ADULT - ADL RETRAINING, OT EVAL
MILD GOAL: Pt will perform LB dressing independently in 4 weeks GOAL: Patient will perform grooming standing sink level independently in 4 weeks

## 2022-03-22 NOTE — ANESTHESIA FOLLOW-UP NOTE - NSPROCEDUREFT_GEN_ALL_CORE
Patient POD 1 s/p  section/hysterectomy and s/p MTP.  Patient now awake, extubated on room air with invasive lines removed.  Patient with no questions at this point.

## 2022-03-22 NOTE — PROGRESS NOTE ADULT - SUBJECTIVE AND OBJECTIVE BOX
Day 1 of Anesthesia Pain Management Service    SUBJECTIVE: I'm okay  Pain Scale Score:    [X] Refer to charted pain scores  Morphine PF Spinal 200mcg x 1    THERAPY: Epidural Bupivacaine 0.01 % and Fentanyl 3 micrograms/mL   Demand Dose: 3 mL  Lockout: 15 minutes   Continuous Rate:  10 mL    MEDICATIONS  (STANDING):  acetaminophen   IVPB .. 1000 milliGRAM(s) IV Intermittent every 6 hours  ALBUTerol    90 MICROgram(s) HFA Inhaler 1 Puff(s) Inhalation every 6 hours  chlorhexidine 2% Cloths 1 Application(s) Topical daily  chlorhexidine 4% Liquid 1 Application(s) Topical <User Schedule>  ertapenem  IVPB 1000 milliGRAM(s) IV Intermittent every 24 hours  fentanyl (3 MICROgram(s)/mL) + bupivacaine 0.01% in 0.9% Sodium Chloride PCEA 250 milliLiter(s) Epidural PCA Continuous  heparin   Injectable 5000 Unit(s) SubCutaneous every 12 hours  levothyroxine Injectable 160 MICROGram(s) IV Push <User Schedule>  levothyroxine Injectable 80 MICROGram(s) IV Push <User Schedule>  loratadine 10 milliGRAM(s) Oral daily  multiple electrolytes Injection Type 1 1000 milliLiter(s) (75 mL/Hr) IV Continuous <Continuous>  pantoprazole    Tablet 40 milliGRAM(s) Oral before breakfast    MEDICATIONS  (PRN):  dexAMETHasone  Injectable 4 milliGRAM(s) IV Push every 6 hours PRN Nausea  diphenhydrAMINE 25 milliGRAM(s) Oral every 4 hours PRN Pruritus  fentanyl (3 MICROgram(s)/mL) + bupivacaine 0.01% in 0.9% Sodium Chloride PCEA Rescue Clinician Bolus 5 milliLiter(s) Epidural every 15 minutes PRN Moderate Pain (4 - 6)  nalbuphine Injectable 2.5 milliGRAM(s) IV Push every 6 hours PRN Pruritus  naloxone Injectable 0.1 milliGRAM(s) IV Push every 3 minutes PRN For ANY of the following changes in patient status:  A. Breaths Per Minute LESS THAN 10, B. Oxygen saturation LESS THAN 90%, C. Sedation score of 6 for Stop After: 4 Times  ondansetron Injectable 4 milliGRAM(s) IV Push every 6 hours PRN Nausea      OBJECTIVE:    Assessment of Epidural Catheter Site: 	    [ ] Dressing intact	[X] Site non-tender	[X] Site without erythema, discharge, edema  [ ] Epidural tubing and connection checked	[X] Gross neurological exam within normal limits  [X] Catheter removed    PT/INR - ( 22 Mar 2022 06:27 )   PT: 11.8 sec;   INR: 1.02 ratio         PTT - ( 22 Mar 2022 06:27 )  PTT:25.5 sec                      9.4    9.15  )-----------( 72       ( 22 Mar 2022 06:27 )             26.9     Vital Signs Last 24 Hrs  T(C): 37 (03-22-22 @ 03:00), Max: 37 (03-22-22 @ 03:00)  T(F): 98.6 (03-22-22 @ 03:00), Max: 98.6 (03-22-22 @ 03:00)  HR: 83 (03-22-22 @ 07:00) (63 - 94)  BP: 117/69 (03-21-22 @ 14:00) (117/69 - 117/69)  BP(mean): 86 (03-21-22 @ 14:00) (86 - 86)  RR: 48 (03-22-22 @ 07:00) (10 - 57)  SpO2: 94% (03-22-22 @ 07:00) (91% - 100%)      Sedation Score:	[X] Alert	[ ] Drowsy	[ ] Arousable  [ ] Asleep  [ ] Unresponsive    Side Effects:	[X] None	[ ] Nausea	[ ] Vomiting  [ ] Pruritus  		[ ] Weakness  [ ] Numbness  [ ] Other:    ASSESSMENT/ PLAN:    Therapy:                         [ ] Continue   [X] Discontinue   [X] Change to PRN Analgesics    Documentation and Verification of current medications:  [X] Done	[ ] Not done, not eligible, reason:    Comments: Pain controlled with PCEA. Monitor platelets prior to removal.

## 2022-03-23 ENCOUNTER — APPOINTMENT (OUTPATIENT)
Dept: OBGYN | Facility: CLINIC | Age: 37
End: 2022-03-23

## 2022-03-23 LAB
ALBUMIN SERPL ELPH-MCNC: 1.9 G/DL — LOW (ref 3.3–5)
ALBUMIN SERPL ELPH-MCNC: 2.5 G/DL — LOW (ref 3.3–5)
ALP SERPL-CCNC: 39 U/L — LOW (ref 40–120)
ALP SERPL-CCNC: 51 U/L — SIGNIFICANT CHANGE UP (ref 40–120)
ALT FLD-CCNC: 8 U/L — LOW (ref 10–45)
ALT FLD-CCNC: 8 U/L — LOW (ref 10–45)
ANION GAP SERPL CALC-SCNC: 16 MMOL/L — SIGNIFICANT CHANGE UP (ref 5–17)
ANION GAP SERPL CALC-SCNC: 8 MMOL/L — SIGNIFICANT CHANGE UP (ref 5–17)
APTT BLD: 25.4 SEC — LOW (ref 27.5–35.5)
AST SERPL-CCNC: 12 U/L — SIGNIFICANT CHANGE UP (ref 10–40)
AST SERPL-CCNC: 14 U/L — SIGNIFICANT CHANGE UP (ref 10–40)
BILIRUB SERPL-MCNC: 0.2 MG/DL — SIGNIFICANT CHANGE UP (ref 0.2–1.2)
BILIRUB SERPL-MCNC: 0.4 MG/DL — SIGNIFICANT CHANGE UP (ref 0.2–1.2)
BUN SERPL-MCNC: 5 MG/DL — LOW (ref 7–23)
BUN SERPL-MCNC: 6 MG/DL — LOW (ref 7–23)
CALCIUM SERPL-MCNC: 5.5 MG/DL — CRITICAL LOW (ref 8.4–10.5)
CALCIUM SERPL-MCNC: 7.5 MG/DL — LOW (ref 8.4–10.5)
CHLORIDE SERPL-SCNC: 101 MMOL/L — SIGNIFICANT CHANGE UP (ref 96–108)
CHLORIDE SERPL-SCNC: 70 MMOL/L — LOW (ref 96–108)
CO2 SERPL-SCNC: 14 MMOL/L — LOW (ref 22–31)
CO2 SERPL-SCNC: 19 MMOL/L — LOW (ref 22–31)
CREAT SERPL-MCNC: 0.58 MG/DL — SIGNIFICANT CHANGE UP (ref 0.5–1.3)
CREAT SERPL-MCNC: <0.3 MG/DL — LOW (ref 0.5–1.3)
EGFR: 121 ML/MIN/1.73M2 — SIGNIFICANT CHANGE UP
EGFR: 142 ML/MIN/1.73M2 — SIGNIFICANT CHANGE UP
GLUCOSE SERPL-MCNC: 100 MG/DL — HIGH (ref 70–99)
GLUCOSE SERPL-MCNC: 77 MG/DL — SIGNIFICANT CHANGE UP (ref 70–99)
HCT VFR BLD CALC: 20.9 % — CRITICAL LOW (ref 34.5–45)
HCT VFR BLD CALC: 26.6 % — LOW (ref 34.5–45)
HGB BLD-MCNC: 7.1 G/DL — LOW (ref 11.5–15.5)
HGB BLD-MCNC: 8.9 G/DL — LOW (ref 11.5–15.5)
INR BLD: 1.15 RATIO — SIGNIFICANT CHANGE UP (ref 0.88–1.16)
MAGNESIUM SERPL-MCNC: 1.5 MG/DL — LOW (ref 1.6–2.6)
MAGNESIUM SERPL-MCNC: 2.2 MG/DL — SIGNIFICANT CHANGE UP (ref 1.6–2.6)
MCHC RBC-ENTMCNC: 29.3 PG — SIGNIFICANT CHANGE UP (ref 27–34)
MCHC RBC-ENTMCNC: 29.8 PG — SIGNIFICANT CHANGE UP (ref 27–34)
MCHC RBC-ENTMCNC: 33.5 GM/DL — SIGNIFICANT CHANGE UP (ref 32–36)
MCHC RBC-ENTMCNC: 34 GM/DL — SIGNIFICANT CHANGE UP (ref 32–36)
MCV RBC AUTO: 87.5 FL — SIGNIFICANT CHANGE UP (ref 80–100)
MCV RBC AUTO: 87.8 FL — SIGNIFICANT CHANGE UP (ref 80–100)
NRBC # BLD: 0 /100 WBCS — SIGNIFICANT CHANGE UP (ref 0–0)
NRBC # BLD: 0 /100 WBCS — SIGNIFICANT CHANGE UP (ref 0–0)
PHOSPHATE SERPL-MCNC: 2.7 MG/DL — SIGNIFICANT CHANGE UP (ref 2.5–4.5)
PHOSPHATE SERPL-MCNC: 2.9 MG/DL — SIGNIFICANT CHANGE UP (ref 2.5–4.5)
PLATELET # BLD AUTO: 54 K/UL — LOW (ref 150–400)
PLATELET # BLD AUTO: 88 K/UL — LOW (ref 150–400)
POTASSIUM SERPL-MCNC: 2.7 MMOL/L — CRITICAL LOW (ref 3.5–5.3)
POTASSIUM SERPL-MCNC: 3.9 MMOL/L — SIGNIFICANT CHANGE UP (ref 3.5–5.3)
POTASSIUM SERPL-SCNC: 2.7 MMOL/L — CRITICAL LOW (ref 3.5–5.3)
POTASSIUM SERPL-SCNC: 3.9 MMOL/L — SIGNIFICANT CHANGE UP (ref 3.5–5.3)
PROT SERPL-MCNC: 3.9 G/DL — LOW (ref 6–8.3)
PROT SERPL-MCNC: 5 G/DL — LOW (ref 6–8.3)
PROTHROM AB SERPL-ACNC: 13.2 SEC — SIGNIFICANT CHANGE UP (ref 10.5–13.4)
RBC # BLD: 2.38 M/UL — LOW (ref 3.8–5.2)
RBC # BLD: 3.04 M/UL — LOW (ref 3.8–5.2)
RBC # FLD: 15.5 % — HIGH (ref 10.3–14.5)
RBC # FLD: 15.6 % — HIGH (ref 10.3–14.5)
SODIUM SERPL-SCNC: 136 MMOL/L — SIGNIFICANT CHANGE UP (ref 135–145)
SODIUM SERPL-SCNC: 92 MMOL/L — CRITICAL LOW (ref 135–145)
WBC # BLD: 11.5 K/UL — HIGH (ref 3.8–10.5)
WBC # BLD: 9.21 K/UL — SIGNIFICANT CHANGE UP (ref 3.8–10.5)
WBC # FLD AUTO: 11.5 K/UL — HIGH (ref 3.8–10.5)
WBC # FLD AUTO: 9.21 K/UL — SIGNIFICANT CHANGE UP (ref 3.8–10.5)

## 2022-03-23 PROCEDURE — 99233 SBSQ HOSP IP/OBS HIGH 50: CPT

## 2022-03-23 RX ORDER — ACETAMINOPHEN 500 MG
1000 TABLET ORAL ONCE
Refills: 0 | Status: COMPLETED | OUTPATIENT
Start: 2022-03-23 | End: 2022-03-24

## 2022-03-23 RX ORDER — FAMOTIDINE 10 MG/ML
20 INJECTION INTRAVENOUS DAILY
Refills: 0 | Status: DISCONTINUED | OUTPATIENT
Start: 2022-03-23 | End: 2022-03-26

## 2022-03-23 RX ORDER — POTASSIUM PHOSPHATE, MONOBASIC POTASSIUM PHOSPHATE, DIBASIC 236; 224 MG/ML; MG/ML
15 INJECTION, SOLUTION INTRAVENOUS ONCE
Refills: 0 | Status: COMPLETED | OUTPATIENT
Start: 2022-03-23 | End: 2022-03-23

## 2022-03-23 RX ORDER — SIMETHICONE 80 MG/1
40 TABLET, CHEWABLE ORAL EVERY 6 HOURS
Refills: 0 | Status: DISCONTINUED | OUTPATIENT
Start: 2022-03-23 | End: 2022-03-26

## 2022-03-23 RX ADMIN — SIMETHICONE 40 MILLIGRAM(S): 80 TABLET, CHEWABLE ORAL at 23:57

## 2022-03-23 RX ADMIN — CHLORHEXIDINE GLUCONATE 1 APPLICATION(S): 213 SOLUTION TOPICAL at 05:56

## 2022-03-23 RX ADMIN — FAMOTIDINE 20 MILLIGRAM(S): 10 INJECTION INTRAVENOUS at 22:00

## 2022-03-23 RX ADMIN — SIMETHICONE 80 MILLIGRAM(S): 80 TABLET, CHEWABLE ORAL at 03:00

## 2022-03-23 RX ADMIN — ONDANSETRON 4 MILLIGRAM(S): 8 TABLET, FILM COATED ORAL at 13:13

## 2022-03-23 RX ADMIN — SIMETHICONE 40 MILLIGRAM(S): 80 TABLET, CHEWABLE ORAL at 10:41

## 2022-03-23 RX ADMIN — Medication 400 MILLIGRAM(S): at 12:08

## 2022-03-23 RX ADMIN — Medication 400 MILLIGRAM(S): at 05:56

## 2022-03-23 RX ADMIN — POTASSIUM PHOSPHATE, MONOBASIC POTASSIUM PHOSPHATE, DIBASIC 62.5 MILLIMOLE(S): 236; 224 INJECTION, SOLUTION INTRAVENOUS at 10:12

## 2022-03-23 RX ADMIN — Medication 400 MILLIGRAM(S): at 00:15

## 2022-03-23 RX ADMIN — HEPARIN SODIUM 5000 UNIT(S): 5000 INJECTION INTRAVENOUS; SUBCUTANEOUS at 17:29

## 2022-03-23 RX ADMIN — SENNA PLUS 2 TABLET(S): 8.6 TABLET ORAL at 22:00

## 2022-03-23 RX ADMIN — Medication 250 MILLILITER(S): at 07:25

## 2022-03-23 RX ADMIN — SIMETHICONE 40 MILLIGRAM(S): 80 TABLET, CHEWABLE ORAL at 17:30

## 2022-03-23 RX ADMIN — PANTOPRAZOLE SODIUM 40 MILLIGRAM(S): 20 TABLET, DELAYED RELEASE ORAL at 05:57

## 2022-03-23 RX ADMIN — Medication 400 MILLIGRAM(S): at 17:29

## 2022-03-23 RX ADMIN — HEPARIN SODIUM 5000 UNIT(S): 5000 INJECTION INTRAVENOUS; SUBCUTANEOUS at 05:56

## 2022-03-23 NOTE — CHART NOTE - NSCHARTNOTEFT_GEN_A_CORE
Patient seen and evaluated at bedside. Pain well controlled with PCEA and Tylenol. Has ambulated OOB. Has tolerated PO intake without N/V. Funk in place. Denies fevers, chills, chest pain, SOB.     Vital Signs Last 24 Hrs  T(C): 36.5 (22 Mar 2022 23:00), Max: 37 (22 Mar 2022 03:00)  T(F): 97.7 (22 Mar 2022 23:00), Max: 98.6 (22 Mar 2022 03:00)  HR: 95 (22 Mar 2022 23:00) (83 - 106)  BP: 108/60 (22 Mar 2022 23:00) (96/55 - 110/58)  BP(mean): 78 (22 Mar 2022 23:00) (72 - 78)  RR: 20 (22 Mar 2022 23:00) (17 - 52)  SpO2: 92% (22 Mar 2022 23:00) (91% - 95%)    Gen: in NAD, sleeping comfortably  Abd: soft, nontender, nondistended, midline vertical incision c/d/i with bandage in place  Funk: clear, pink tinged urine, yellow urine in tubing  : no bleeding noted on pad  Ext: SCDs in place    A/P: 35yo  POD2 s/p RCS, RAMEZ, BS, bilateral ureterolysis, bilat uterine artery ligation, Cysto for known placenta accreta under GETA (EBL 6L) c/b MTP s/p 13u pRBC, 12u FFP, 2u Cryo, 2Plt, currently in SICU for closer postoperative hemodynamic monitoring. Patient is currently HD stable with minimal vaginal bleeding and pain on exam. Pt currently listed for floor.    -Labs per SICU  -Strict I/Os  -pad counts  -ADAT  -Funk until POD#2  -c/w Invanz (3/21-) until POD3  -Epidural catheter in place  -Appreciate excellent SICU care    RFrankel PGY3

## 2022-03-23 NOTE — PROGRESS NOTE ADULT - SUBJECTIVE AND OBJECTIVE BOX
Day 2\1 of Anesthesia Pain Management Service    SUBJECTIVE: I'm okay  Pain Scale Score:    [X] Refer to charted pain scores    THERAPY: Epidural Bupivacaine 0.01 % and Fentanyl 3 micrograms/mL     Demand Dose: 3 mL  Lockout: 15 minutes   Continuous Rate:  10 mL    MEDICATIONS  (STANDING):  acetaminophen   IVPB .. 1000 milliGRAM(s) IV Intermittent every 6 hours  ALBUTerol    90 MICROgram(s) HFA Inhaler 1 Puff(s) Inhalation every 6 hours  chlorhexidine 2% Cloths 1 Application(s) Topical daily  chlorhexidine 4% Liquid 1 Application(s) Topical <User Schedule>  fentanyl (3 MICROgram(s)/mL) + bupivacaine 0.01% in 0.9% Sodium Chloride PCEA 250 milliLiter(s) Epidural PCA Continuous  heparin   Injectable 5000 Unit(s) SubCutaneous every 12 hours  levothyroxine Injectable 160 MICROGram(s) IV Push <User Schedule>  levothyroxine Injectable 80 MICROGram(s) IV Push <User Schedule>  loratadine 10 milliGRAM(s) Oral daily  pantoprazole    Tablet 40 milliGRAM(s) Oral before breakfast  polyethylene glycol 3350 17 Gram(s) Oral daily  senna 2 Tablet(s) Oral at bedtime    MEDICATIONS  (PRN):  dexAMETHasone  Injectable 4 milliGRAM(s) IV Push every 6 hours PRN Nausea  diphenhydrAMINE 25 milliGRAM(s) Oral every 4 hours PRN Pruritus  fentanyl (3 MICROgram(s)/mL) + bupivacaine 0.01% in 0.9% Sodium Chloride PCEA Rescue Clinician Bolus 5 milliLiter(s) Epidural every 15 minutes PRN Moderate Pain (4 - 6)  nalbuphine Injectable 2.5 milliGRAM(s) IV Push every 6 hours PRN Pruritus  naloxone Injectable 0.1 milliGRAM(s) IV Push every 3 minutes PRN For ANY of the following changes in patient status:  A. Breaths Per Minute LESS THAN 10, B. Oxygen saturation LESS THAN 90%, C. Sedation score of 6 for Stop After: 4 Times  ondansetron Injectable 4 milliGRAM(s) IV Push every 6 hours PRN Nausea  simethicone 80 milliGRAM(s) Chew daily PRN Gas      OBJECTIVE:    Assessment of Epidural Catheter Site: 	    [X] Dressing intact	[X] Site non-tender	[X] Site without erythema, discharge, edema  [X] Epidural tubing and connection checked	[X] Gross neurological exam within normal limits  [ ] Catheter removed – tip intact		    PT/INR - ( 23 Mar 2022 06:09 )   PT: 13.2 sec;   INR: 1.15 ratio         PTT - ( 23 Mar 2022 06:09 )  PTT:25.4 sec                      8.9    11.50 )-----------( 88       ( 23 Mar 2022 07:09 )             26.6     Vital Signs Last 24 Hrs  T(C): 36.2 (03-23-22 @ 03:00), Max: 37 (03-22-22 @ 11:00)  T(F): 97.2 (03-23-22 @ 03:00), Max: 98.6 (03-22-22 @ 11:00)  HR: 95 (03-23-22 @ 07:00) (85 - 106)  BP: 119/54 (03-23-22 @ 07:00) (96/55 - 119/54)  BP(mean): 78 (03-23-22 @ 07:00) (72 - 80)  RR: 18 (03-23-22 @ 07:00) (15 - 31)  SpO2: 93% (03-23-22 @ 07:00) (91% - 94%)      Sedation Score:	[X] Alert	[ ] Drowsy	[ ] Arousable  [ ] Asleep  [ ] Unresponsive    Side Effects:	[X] None	[ ] Nausea	[ ] Vomiting  [ ] Pruritus  		[ ] Weakness  [ ] Numbness  [ ] Other:    ASSESSMENT/ PLAN: Endorsing good analgesia. Anticoagulation status reviewed. Platelets 54K down from76 yesterday. Continue PCEA and monitor lab results.     Therapy:                         [X] Continue   [ ] Discontinue   [ ] Change to PRN Analgesics    Documentation and Verification of current medications:  [X] Done	[ ] Not done, not eligible, reason:    Comments:

## 2022-03-23 NOTE — PROGRESS NOTE ADULT - SUBJECTIVE AND OBJECTIVE BOX
R3 Antepartum Note, HD#3 POD#2    Interval events: Patient seen and examined at bedside, no acute overnight events. No acute complaints. She is ambulating, tolerating regular diet w/o n/v, fole in place, not yet passing gas.    Vital Signs Last 24 Hours  T(C): 36.2 (03-23-22 @ 03:00), Max: 37 (03-22-22 @ 11:00)  HR: 95 (03-23-22 @ 07:00) (85 - 106)  BP: 119/54 (03-23-22 @ 07:00) (96/55 - 119/54)  RR: 18 (03-23-22 @ 07:00) (15 - 31)  SpO2: 93% (03-23-22 @ 07:00) (91% - 94%)      Physical Exam:  General: NAD  CV: RRR  Reps: breathing comfortably on RA  Abdomen: Soft, minmally tender to palpation, honeycomb dressing removed, steris in place C/D/I   : massey in place draining pink tinged urine   Ext: No pain, 1-2+ BLE swelling    Labs:             8.9    11.50 )-----------( 88       ( 03-23 @ 07:09 )             26.6     03-23 @ 07:09    136  |  101  |  6   ----------------------------<  100  3.9   |  19  |  0.58    Ca    7.5      03-23 @ 07:09  Phos  2.9     03-23 @ 07:09  Mg     2.2     03-23 @ 07:09    TPro  5.0  /  Alb  2.5  /  TBili  0.4  /  DBili  x   /  AST  14  /  ALT  8   /  AlkPhos  51  03-23 @ 07:09    PT/INR - ( 03-23 @ 06:09 )   PT: 13.2 sec;   INR: 1.15 ratio    PTT - ( 03-23 @ 06:09 )  PTT:25.4 sec    Uric Acid: (03-22 @ 06:27)  --       Fibrinogen: (03-22 @ 06:27)  574      LDH: (03-22 @ 06:27)  --         MEDICATIONS  (STANDING):  acetaminophen   IVPB .. 1000 milliGRAM(s) IV Intermittent every 6 hours  ALBUTerol    90 MICROgram(s) HFA Inhaler 1 Puff(s) Inhalation every 6 hours  chlorhexidine 2% Cloths 1 Application(s) Topical daily  chlorhexidine 4% Liquid 1 Application(s) Topical <User Schedule>  fentanyl (3 MICROgram(s)/mL) + bupivacaine 0.01% in 0.9% Sodium Chloride PCEA 250 milliLiter(s) Epidural PCA Continuous  heparin   Injectable 5000 Unit(s) SubCutaneous every 12 hours  levothyroxine Injectable 160 MICROGram(s) IV Push <User Schedule>  levothyroxine Injectable 80 MICROGram(s) IV Push <User Schedule>  loratadine 10 milliGRAM(s) Oral daily  multiple electrolytes Injection Type 1 1000 milliLiter(s) (75 mL/Hr) IV Continuous <Continuous>  pantoprazole    Tablet 40 milliGRAM(s) Oral before breakfast  polyethylene glycol 3350 17 Gram(s) Oral daily  senna 2 Tablet(s) Oral at bedtime    MEDICATIONS  (PRN):  dexAMETHasone  Injectable 4 milliGRAM(s) IV Push every 6 hours PRN Nausea  diphenhydrAMINE 25 milliGRAM(s) Oral every 4 hours PRN Pruritus  fentanyl (3 MICROgram(s)/mL) + bupivacaine 0.01% in 0.9% Sodium Chloride PCEA Rescue Clinician Bolus 5 milliLiter(s) Epidural every 15 minutes PRN Moderate Pain (4 - 6)  nalbuphine Injectable 2.5 milliGRAM(s) IV Push every 6 hours PRN Pruritus  naloxone Injectable 0.1 milliGRAM(s) IV Push every 3 minutes PRN For ANY of the following changes in patient status:  A. Breaths Per Minute LESS THAN 10, B. Oxygen saturation LESS THAN 90%, C. Sedation score of 6 for Stop After: 4 Times  ondansetron Injectable 4 milliGRAM(s) IV Push every 6 hours PRN Nausea  simethicone 80 milliGRAM(s) Chew daily PRN Gas

## 2022-03-23 NOTE — PROGRESS NOTE ADULT - SUBJECTIVE AND OBJECTIVE BOX
HISTORY  35yo F  with h/o asthma, hypothyroidism, laparoscopic myomectomy and prior C/S presenting for a scheduled  hysterectomy for a known placenta accreta. Patient had massive blood loss of 6L during the case requiring MTP.    Product totals:  13u PRBC  12u FFP  2u cryo  2u plt  TXA x1      24 HOUR EVENTS:  - Added HSq for VTE ppx  - Ertapenem completed  - Added decadron prn for nausea      NEURO  Exam:   Meds: acetaminophen   IVPB .. 1000 milliGRAM(s) IV Intermittent every 6 hours  diphenhydrAMINE 25 milliGRAM(s) Oral every 4 hours PRN Pruritus  fentanyl (3 MICROgram(s)/mL) + bupivacaine 0.01% in 0.9% Sodium Chloride PCEA 250 milliLiter(s) Epidural PCA Continuous  fentanyl (3 MICROgram(s)/mL) + bupivacaine 0.01% in 0.9% Sodium Chloride PCEA Rescue Clinician Bolus 5 milliLiter(s) Epidural every 15 minutes PRN Moderate Pain (4 - 6)  nalbuphine Injectable 2.5 milliGRAM(s) IV Push every 6 hours PRN Pruritus  ondansetron Injectable 4 milliGRAM(s) IV Push every 6 hours PRN Nausea  [x] Adequacy of sedation and pain control has been assessed and adjusted      RESPIRATORY  RR: 17 (22 @ 04:00) (15 - 48)  SpO2: 93% (22 @ 04:00) (91% - 94%)  Exam:   ABG - ( 22 Mar 2022 06:23 )  pH: 7.46  /  pCO2: 37    /  pO2: 80    / HCO3: 26    / Base Excess: 2.4   /  SaO2: 98.0    Meds: ALBUTerol    90 MICROgram(s) HFA Inhaler 1 Puff(s) Inhalation every 6 hours  loratadine 10 milliGRAM(s) Oral daily      CARDIOVASCULAR  HR: 90 (22 @ 04:00) (83 - 106)  BP: 112/62 (22 @ 04:00) (96/55 - 112/62)  BP(mean): 80 (22 @ 04:00) (72 - 80)  ABP: 111/61 (22 @ 07:00) (111/61 - 113/62)  ABP(mean): 79 (22 @ 07:00) (79 - 80)  Wt(kg): --  CVP(cm H2O): --      Exam:  Cardiac Rhythm:  Perfusion     [ ]Adequate   [ ]Inadequate  Mentation   [ ]Normal       [ ]Reduced  Extremities  [ ]Warm         [ ]Cool  Volume Status [ ]Hypervolemic [ ]Euvolemic [ ]Hypovolemic  Meds:       GI/NUTRITION  Exam:  Diet:  Meds: pantoprazole    Tablet 40 milliGRAM(s) Oral before breakfast  polyethylene glycol 3350 17 Gram(s) Oral daily  senna 2 Tablet(s) Oral at bedtime  simethicone 80 milliGRAM(s) Chew daily PRN Gas      GENITOURINARY  I&O's Detail     @ 07:  -   @ 07:00  --------------------------------------------------------  IN:    IV PiggyBack: 100 mL    IV PiggyBack: 300 mL    multiple electrolytes Injection Type 1.: 1725 mL    Oral Fluid: 600 mL    Propofol: 23.6 mL  Total IN: 2748.6 mL    OUT:    Indwelling Catheter - Urethral (mL): 2375 mL  Total OUT: 2375 mL    Total NET: 373.6 mL       @ 07:  -   @ 05:18  --------------------------------------------------------  IN:    IV PiggyBack: 400 mL    multiple electrolytes Injection Type 1.: 1500 mL  Total IN: 1900 mL    OUT:    Indwelling Catheter - Urethral (mL): 2750 mL  Total OUT: 2750 mL    Total NET: -850 mL              137  |  100  |  7   ----------------------------<  113<H>  4.3   |  24  |  0.67    Ca    8.9      22 Mar 2022 00:13  Phos  5.6       Mg     1.7         TPro  5.1<L>  /  Alb  3.1<L>  /  TBili  0.8  /  DBili  x   /  AST  24  /  ALT  13  /  AlkPhos  44      [ ] Funk catheter, indication:   Meds: multiple electrolytes Injection Type 1 1000 milliLiter(s) IV Continuous <Continuous>        HEMATOLOGIC  Meds: heparin   Injectable 5000 Unit(s) SubCutaneous every 12 hours    [x] VTE Prophylaxis                        9.2    10.87 )-----------( 76       ( 22 Mar 2022 18:59 )             26.9     PT/INR - ( 22 Mar 2022 06:27 )   PT: 11.8 sec;   INR: 1.02 ratio         PTT - ( 22 Mar 2022 06:27 )  PTT:25.5 sec  Transfusion     [ ] PRBC   [ ] Platelets   [ ] FFP   [ ] Cryoprecipitate      INFECTIOUS DISEASES  T(C): 36.2 (22 @ 03:00), Max: 37 (22 @ 11:00)  Wt(kg): --  WBC Count: 10.87 K/uL ( @ 18:59)  WBC Count: 9.15 K/uL ( @ 06:27)    Recent Cultures:    Meds:       ENDOCRINE  Capillary Blood Glucose    Meds: dexAMETHasone  Injectable 4 milliGRAM(s) IV Push every 6 hours PRN  levothyroxine Injectable 160 MICROGram(s) IV Push <User Schedule>  levothyroxine Injectable 80 MICROGram(s) IV Push <User Schedule>        ACCESS DEVICES:  [x] Peripheral IV  [ ] Central Venous Line	[ ] R	[ ] L	[ ] IJ	[ ] Fem	[ ] SC	Placed:   [ ] Arterial Line		[ ] R	[ ] L	[ ] Fem	[ ] Rad	[ ] Ax	Placed:   [ ] PICC:					[ ] Mediport  [ ] Urinary Catheter, Date Placed:   [x] Necessity of urinary, arterial, and venous catheters discussed    OTHER MEDICATIONS:  chlorhexidine 2% Cloths 1 Application(s) Topical daily  chlorhexidine 4% Liquid 1 Application(s) Topical <User Schedule>  naloxone Injectable 0.1 milliGRAM(s) IV Push every 3 minutes PRN      CODE STATUS:     IMAGING: HISTORY  37yo F  with h/o asthma, hypothyroidism, laparoscopic myomectomy and prior C/S presenting for a scheduled  hysterectomy for a known placenta accreta. Patient had massive blood loss of 6L during the case requiring MTP.    Product totals:  13u PRBC  12u FFP  2u cryo  2u plt  TXA x1      24 HOUR EVENTS:  - Added HSq for VTE ppx  - Ertapenem completed  - Added decadron prn for nausea      NEURO  Exam: AOx4, following commands  Meds: acetaminophen   IVPB .. 1000 milliGRAM(s) IV Intermittent every 6 hours  diphenhydrAMINE 25 milliGRAM(s) Oral every 4 hours PRN Pruritus  fentanyl (3 MICROgram(s)/mL) + bupivacaine 0.01% in 0.9% Sodium Chloride PCEA 250 milliLiter(s) Epidural PCA Continuous  fentanyl (3 MICROgram(s)/mL) + bupivacaine 0.01% in 0.9% Sodium Chloride PCEA Rescue Clinician Bolus 5 milliLiter(s) Epidural every 15 minutes PRN Moderate Pain (4 - 6)  nalbuphine Injectable 2.5 milliGRAM(s) IV Push every 6 hours PRN Pruritus  ondansetron Injectable 4 milliGRAM(s) IV Push every 6 hours PRN Nausea  [x] Adequacy of sedation and pain control has been assessed and adjusted      RESPIRATORY  RR: 17 (22 @ 04:00) (15 - 48)  SpO2: 93% (22 @ 04:00) (91% - 94%)  Exam: unlabored respirations on room air  ABG - ( 22 Mar 2022 06:23 )  pH: 7.46  /  pCO2: 37    /  pO2: 80    / HCO3: 26    / Base Excess: 2.4   /  SaO2: 98.0    Meds: ALBUTerol    90 MICROgram(s) HFA Inhaler 1 Puff(s) Inhalation every 6 hours  loratadine 10 milliGRAM(s) Oral daily      CARDIOVASCULAR  HR: 90 (22 @ 04:00) (83 - 106)  BP: 112/62 (22 @ 04:00) (96/55 - 112/62)  BP(mean): 80 (22 @ 04:00) (72 - 80)  ABP: 111/61 (22 @ 07:00) (111/61 - 113/62)  ABP(mean): 79 (22 @ 07:00) (79 - 80)  Exam: RRR  Cardiac Rhythm: normal sinus  Perfusion     [x]Adequate   [ ]Inadequate  Mentation   [x]Normal       [ ]Reduced  Extremities  [x]Warm         [ ]Cool  Volume Status [ ]Hypervolemic [x]Euvolemic [ ]Hypovolemic  Meds:       GI/NUTRITION  Exam: soft, moderately distended and tender to palpation, worse along midline.   Diet: Regular  Meds: pantoprazole    Tablet 40 milliGRAM(s) Oral before breakfast  polyethylene glycol 3350 17 Gram(s) Oral daily  senna 2 Tablet(s) Oral at bedtime  simethicone 80 milliGRAM(s) Chew daily PRN Gas      GENITOURINARY  I&O's Detail     @ 07:  -   @ 07:00  --------------------------------------------------------  IN:    IV PiggyBack: 100 mL    IV PiggyBack: 300 mL    multiple electrolytes Injection Type 1.: 1725 mL    Oral Fluid: 600 mL    Propofol: 23.6 mL  Total IN: 2748.6 mL    OUT:    Indwelling Catheter - Urethral (mL): 2375 mL  Total OUT: 2375 mL    Total NET: 373.6 mL     @ 07: @ 05:18  --------------------------------------------------------  IN:    IV PiggyBack: 400 mL    multiple electrolytes Injection Type 1.: 1500 mL  Total IN: 1900 mL    OUT:    Indwelling Catheter - Urethral (mL): 2750 mL  Total OUT: 2750 mL    Total NET: -850 mL        137  |  100  |  7   ----------------------------<  113<H>  4.3   |  24  |  0.67    Ca    8.9      22 Mar 2022 00:13  Phos  5.6     03-22  Mg     1.7         TPro  5.1<L>  /  Alb  3.1<L>  /  TBili  0.8  /  DBili  x   /  AST  24  /  ALT  13  /  AlkPhos  44      [x] Funk catheter, indication: damien-op  Meds: multiple electrolytes Injection Type 1 1000 milliLiter(s) IV Continuous <Continuous>      HEMATOLOGIC  Meds: heparin   Injectable 5000 Unit(s) SubCutaneous every 12 hours  [x] VTE Prophylaxis                        9.2    10.87 )-----------( 76       ( 22 Mar 2022 18:59 )             26.9     PT/INR - ( 22 Mar 2022 06:27 )   PT: 11.8 sec;   INR: 1.02 ratio    PTT - ( 22 Mar 2022 06:27 )  PTT:25.5 sec  Transfusion     [ ] PRBC   [ ] Platelets   [ ] FFP   [ ] Cryoprecipitate      INFECTIOUS DISEASES  T(C): 36.2 (22 @ 03:00), Max: 37 (22 @ 11:00)  WBC Count: 10.87 K/uL ( @ 18:59)  WBC Count: 9.15 K/uL ( @ 06:27)    Recent Cultures:    Meds:       ENDOCRINE  Capillary Blood Glucose    Meds: dexAMETHasone  Injectable 4 milliGRAM(s) IV Push every 6 hours PRN  levothyroxine Injectable 160 MICROGram(s) IV Push <User Schedule>  levothyroxine Injectable 80 MICROGram(s) IV Push <User Schedule>      ACCESS DEVICES:  [x] Peripheral IV  [ ] Central Venous Line	[ ] R	[ ] L	[ ] IJ	[ ] Fem	[ ] SC	Placed:   [ ] Arterial Line		[ ] R	[ ] L	[ ] Fem	[ ] Rad	[ ] Ax	Placed:   [ ] PICC:					[ ] Mediport  [x] Urinary Catheter, Date Placed:   [x] Necessity of urinary, arterial, and venous catheters discussed      OTHER MEDICATIONS:  chlorhexidine 2% Cloths 1 Application(s) Topical daily  chlorhexidine 4% Liquid 1 Application(s) Topical <User Schedule>  naloxone Injectable 0.1 milliGRAM(s) IV Push every 3 minutes PRN    CODE STATUS: full code    IMAGING:

## 2022-03-23 NOTE — PROGRESS NOTE ADULT - SUBJECTIVE AND OBJECTIVE BOX
Pain Management Attending Addendum    SUBJECTIVE: Patient doing well with PCEA    Therapy:    [X] PCEA    OBJECTIVE:   [X] Pain appropriately controlled    [ ] Other:    Side Effects:  [X] None	             [ ] Nausea              [ ] Pruritis        [ ] Weakness          [ ] Numbness        	[ ] Other:    ASSESSMENT/PLAN:    [X] Continue current therapy    [ ] Therapy changed to:    [ ] PRN Analgesics   [ ] IV PCA    Comments: platelet count downtrended to 50s, with repeat platelets in  80s; will keep PCEA for 1 more day and trend labs

## 2022-03-24 LAB
ALBUMIN SERPL ELPH-MCNC: 2.8 G/DL — LOW (ref 3.3–5)
ALP SERPL-CCNC: 59 U/L — SIGNIFICANT CHANGE UP (ref 40–120)
ALT FLD-CCNC: 10 U/L — SIGNIFICANT CHANGE UP (ref 10–45)
ANION GAP SERPL CALC-SCNC: 11 MMOL/L — SIGNIFICANT CHANGE UP (ref 5–17)
APTT BLD: 27.4 SEC — LOW (ref 27.5–35.5)
AST SERPL-CCNC: 10 U/L — SIGNIFICANT CHANGE UP (ref 10–40)
BILIRUB SERPL-MCNC: 0.3 MG/DL — SIGNIFICANT CHANGE UP (ref 0.2–1.2)
BUN SERPL-MCNC: 7 MG/DL — SIGNIFICANT CHANGE UP (ref 7–23)
CALCIUM SERPL-MCNC: 8 MG/DL — LOW (ref 8.4–10.5)
CHLORIDE SERPL-SCNC: 101 MMOL/L — SIGNIFICANT CHANGE UP (ref 96–108)
CO2 SERPL-SCNC: 21 MMOL/L — LOW (ref 22–31)
CREAT SERPL-MCNC: 0.74 MG/DL — SIGNIFICANT CHANGE UP (ref 0.5–1.3)
EGFR: 108 ML/MIN/1.73M2 — SIGNIFICANT CHANGE UP
GLUCOSE SERPL-MCNC: 88 MG/DL — SIGNIFICANT CHANGE UP (ref 70–99)
HCT VFR BLD CALC: 25.9 % — LOW (ref 34.5–45)
HGB BLD-MCNC: 8.6 G/DL — LOW (ref 11.5–15.5)
INR BLD: 0.97 RATIO — SIGNIFICANT CHANGE UP (ref 0.88–1.16)
MAGNESIUM SERPL-MCNC: 2 MG/DL — SIGNIFICANT CHANGE UP (ref 1.6–2.6)
MCHC RBC-ENTMCNC: 29.8 PG — SIGNIFICANT CHANGE UP (ref 27–34)
MCHC RBC-ENTMCNC: 33.2 GM/DL — SIGNIFICANT CHANGE UP (ref 32–36)
MCV RBC AUTO: 89.6 FL — SIGNIFICANT CHANGE UP (ref 80–100)
NRBC # BLD: 0 /100 WBCS — SIGNIFICANT CHANGE UP (ref 0–0)
PHOSPHATE SERPL-MCNC: 3.2 MG/DL — SIGNIFICANT CHANGE UP (ref 2.5–4.5)
PLATELET # BLD AUTO: 109 K/UL — LOW (ref 150–400)
POTASSIUM SERPL-MCNC: 3.6 MMOL/L — SIGNIFICANT CHANGE UP (ref 3.5–5.3)
POTASSIUM SERPL-SCNC: 3.6 MMOL/L — SIGNIFICANT CHANGE UP (ref 3.5–5.3)
PROT SERPL-MCNC: 5.3 G/DL — LOW (ref 6–8.3)
PROTHROM AB SERPL-ACNC: 11.2 SEC — SIGNIFICANT CHANGE UP (ref 10.5–13.4)
RBC # BLD: 2.89 M/UL — LOW (ref 3.8–5.2)
RBC # FLD: 14.9 % — HIGH (ref 10.3–14.5)
SODIUM SERPL-SCNC: 133 MMOL/L — LOW (ref 135–145)
WBC # BLD: 9.93 K/UL — SIGNIFICANT CHANGE UP (ref 3.8–10.5)
WBC # FLD AUTO: 9.93 K/UL — SIGNIFICANT CHANGE UP (ref 3.8–10.5)

## 2022-03-24 RX ORDER — IBUPROFEN 200 MG
600 TABLET ORAL EVERY 6 HOURS
Refills: 0 | Status: DISCONTINUED | OUTPATIENT
Start: 2022-03-24 | End: 2022-03-26

## 2022-03-24 RX ORDER — ACETAMINOPHEN 500 MG
975 TABLET ORAL EVERY 6 HOURS
Refills: 0 | Status: DISCONTINUED | OUTPATIENT
Start: 2022-03-24 | End: 2022-03-26

## 2022-03-24 RX ORDER — LEVOTHYROXINE SODIUM 125 MCG
160 TABLET ORAL DAILY
Refills: 0 | Status: DISCONTINUED | OUTPATIENT
Start: 2022-03-24 | End: 2022-03-24

## 2022-03-24 RX ORDER — OXYCODONE HYDROCHLORIDE 5 MG/1
5 TABLET ORAL EVERY 6 HOURS
Refills: 0 | Status: DISCONTINUED | OUTPATIENT
Start: 2022-03-24 | End: 2022-03-26

## 2022-03-24 RX ORDER — LEVOTHYROXINE SODIUM 125 MCG
200 TABLET ORAL DAILY
Refills: 0 | Status: DISCONTINUED | OUTPATIENT
Start: 2022-03-24 | End: 2022-03-26

## 2022-03-24 RX ADMIN — Medication 160 MICROGRAM(S): at 05:19

## 2022-03-24 RX ADMIN — OXYCODONE HYDROCHLORIDE 5 MILLIGRAM(S): 5 TABLET ORAL at 22:56

## 2022-03-24 RX ADMIN — HEPARIN SODIUM 5000 UNIT(S): 5000 INJECTION INTRAVENOUS; SUBCUTANEOUS at 05:18

## 2022-03-24 RX ADMIN — Medication 600 MILLIGRAM(S): at 17:06

## 2022-03-24 RX ADMIN — SIMETHICONE 40 MILLIGRAM(S): 80 TABLET, CHEWABLE ORAL at 22:00

## 2022-03-24 RX ADMIN — Medication 1000 MILLIGRAM(S): at 05:06

## 2022-03-24 RX ADMIN — Medication 600 MILLIGRAM(S): at 12:34

## 2022-03-24 RX ADMIN — LORATADINE 10 MILLIGRAM(S): 10 TABLET ORAL at 14:07

## 2022-03-24 RX ADMIN — Medication 975 MILLIGRAM(S): at 22:00

## 2022-03-24 RX ADMIN — SIMETHICONE 40 MILLIGRAM(S): 80 TABLET, CHEWABLE ORAL at 12:34

## 2022-03-24 RX ADMIN — HEPARIN SODIUM 5000 UNIT(S): 5000 INJECTION INTRAVENOUS; SUBCUTANEOUS at 17:06

## 2022-03-24 RX ADMIN — SENNA PLUS 2 TABLET(S): 8.6 TABLET ORAL at 22:00

## 2022-03-24 RX ADMIN — PANTOPRAZOLE SODIUM 40 MILLIGRAM(S): 20 TABLET, DELAYED RELEASE ORAL at 14:07

## 2022-03-24 RX ADMIN — Medication 200 MICROGRAM(S): at 22:00

## 2022-03-24 RX ADMIN — FAMOTIDINE 20 MILLIGRAM(S): 10 INJECTION INTRAVENOUS at 12:35

## 2022-03-24 RX ADMIN — Medication 400 MILLIGRAM(S): at 04:34

## 2022-03-24 RX ADMIN — Medication 975 MILLIGRAM(S): at 21:13

## 2022-03-24 NOTE — PROGRESS NOTE ADULT - SUBJECTIVE AND OBJECTIVE BOX
OB Postpartum Note    Interval events: Patient seen and examined at bedside, no acute overnight events. No acute complaints. Pain well controlled with IV Tylenol. She is using PCEA intermittently. She is ambulating, tolerating regular diet, urinating spontaneously, passing flatus.    O:   Vitals:  Vital Signs Last 24 Hrs  T(C): 36.9 (24 Mar 2022 00:00), Max: 37 (23 Mar 2022 07:00)  T(F): 98.4 (24 Mar 2022 00:00), Max: 98.6 (23 Mar 2022 07:00)  HR: 87 (24 Mar 2022 00:00) (80 - 102)  BP: 104/71 (24 Mar 2022 00:00) (101/71 - 119/54)  BP(mean): 78 (23 Mar 2022 07:00) (78 - 80)  RR: 18 (24 Mar 2022 00:00) (18 - 18)  SpO2: 95% (24 Mar 2022 00:00) (92% - 98%)    MEDICATIONS  (STANDING):  ALBUTerol    90 MICROgram(s) HFA Inhaler 1 Puff(s) Inhalation every 6 hours  chlorhexidine 2% Cloths 1 Application(s) Topical daily  chlorhexidine 4% Liquid 1 Application(s) Topical <User Schedule>  famotidine    Tablet 20 milliGRAM(s) Oral daily  fentanyl (3 MICROgram(s)/mL) + bupivacaine 0.01% in 0.9% Sodium Chloride PCEA 250 milliLiter(s) Epidural PCA Continuous  heparin   Injectable 5000 Unit(s) SubCutaneous every 12 hours  levothyroxine Injectable 160 MICROGram(s) IV Push <User Schedule>  levothyroxine Injectable 80 MICROGram(s) IV Push <User Schedule>  loratadine 10 milliGRAM(s) Oral daily  pantoprazole    Tablet 40 milliGRAM(s) Oral before breakfast  polyethylene glycol 3350 17 Gram(s) Oral daily  senna 2 Tablet(s) Oral at bedtime    MEDICATIONS  (PRN):  dexAMETHasone  Injectable 4 milliGRAM(s) IV Push every 6 hours PRN Nausea  diphenhydrAMINE 25 milliGRAM(s) Oral every 4 hours PRN Pruritus  fentanyl (3 MICROgram(s)/mL) + bupivacaine 0.01% in 0.9% Sodium Chloride PCEA Rescue Clinician Bolus 5 milliLiter(s) Epidural every 15 minutes PRN Moderate Pain (4 - 6)  nalbuphine Injectable 2.5 milliGRAM(s) IV Push every 6 hours PRN Pruritus  naloxone Injectable 0.1 milliGRAM(s) IV Push every 3 minutes PRN For ANY of the following changes in patient status:  A. Breaths Per Minute LESS THAN 10, B. Oxygen saturation LESS THAN 90%, C. Sedation score of 6 for Stop After: 4 Times  simethicone 40 milliGRAM(s) Chew every 6 hours PRN Gas      Labs:  Blood type: A Positive  Rubella IgG: RPR: Negative                          8.9<L>   11.50<H> >-----------< 88<L>    ( 03-23 @ 07:09 )             26.6<L>                        7.1<L>   9.21 >-----------< 54<L>    ( 03-23 @ 06:09 )             20.9<LL>                        9.2<L>   10.87<H> >-----------< 76<L>    ( 03-22 @ 18:59 )             26.9<L>                        9.4<L>   9.15 >-----------< 72<L>    ( 03-22 @ 06:27 )             26.9<L>                        9.6<L>   8.30 >-----------< 67<L>    ( 03-22 @ 00:13 )             26.9<L>                        9.7<L>   9.17 >-----------< 55<L>    ( 03-21 @ 18:04 )             28.3<L>                        10.0<L>   9.43 >-----------< 67<L>    ( 03-21 @ 14:17 )             29.3<L>                        12.6   24.04<H> >-----------< 161    ( 03-21 @ 10:18 )             38.6    03-23-22 @ 07:09      136  |  101  |  6<L>  ----------------------------<  100<H>  3.9   |  19<L>  |  0.58    03-23-22 @ 06:09      92<LL>  |  70<L>  |  5<L>  ----------------------------<  77  2.7<LL>   |  14<L>  |  <0.30<L>    03-22-22 @ 00:13      137  |  100  |  7   ----------------------------<  113<H>  4.3   |  24  |  0.67    03-21-22 @ 14:44      141  |  106  |  7   ----------------------------<  103<H>  4.2   |  21<L>  |  0.62    03-21-22 @ 10:18      135  |  111<H>  |  7   ----------------------------<  271<H>  6.8<HH>   |  14<L>  |  0.53        Ca    7.5<L>      23 Mar 2022 07:09  Ca    5.5<LL>      23 Mar 2022 06:09  Ca    8.9      22 Mar 2022 00:13  Ca    11.2<H>      21 Mar 2022 14:44  Ca    8.8      21 Mar 2022 10:18  Phos  2.9     03-23  Phos  2.7     03-23  Phos  5.6<H>     03-22  Phos  4.7<H>     03-21  Mg     2.2     03-23  Mg     1.5<L>     03-23  Mg     1.7     03-22  Mg     1.7     03-21    TPro  5.0<L>  /  Alb  2.5<L>  /  TBili  0.4  /  DBili  x   /  AST  14  /  ALT  8<L>  /  AlkPhos  51  03-23-22 @ 07:09  TPro  3.9<L>  /  Alb  1.9<L>  /  TBili  0.2  /  DBili  x   /  AST  12  /  ALT  8<L>  /  AlkPhos  39<L>  03-23-22 @ 06:09  TPro  5.1<L>  /  Alb  3.1<L>  /  TBili  0.8  /  DBili  x   /  AST  24  /  ALT  13  /  AlkPhos  44  03-22-22 @ 00:13  TPro  5.2<L>  /  Alb  3.2<L>  /  TBili  1.8<H>  /  DBili  0.3  /  AST  27  /  ALT  19  /  AlkPhos  46  03-21-22 @ 14:44        Physical Exam:  General: NAD  CV: RRR  Reps: breathing comfortably on RA  Abdomen: Soft, minimally tender to palpation, steris in place, midline vertical incision C/D/I   Ext: No pain, 1-2+ BLE swelling

## 2022-03-24 NOTE — CHART NOTE - NSCHARTNOTEFT_GEN_A_CORE
Called to bedside by RN reporting pt was in pain and complaining of a "suture coming out of her vagina". Pt evaluated at bedside, denies any pain but tearful and concerned about the uncertainty of what was protruding which she noticed after voiding and wiping herself with the aid of her . She was informed she had Surgicel vaginal packing inserted and that is likely what was protruding.     Vital Signs Last 24 Hrs  T(C): 36.9 (24 Mar 2022 18:00), Max: 37 (24 Mar 2022 05:29)  T(F): 98.4 (24 Mar 2022 18:00), Max: 98.6 (24 Mar 2022 05:29)  HR: 67 (24 Mar 2022 18:00) (67 - 91)  BP: 109/74 (24 Mar 2022 18:00) (96/65 - 114/78)  RR: 18 (24 Mar 2022 18:00) (18 - 18)  SpO2: 96% (24 Mar 2022 18:00) (95% - 98%)    GEN: NAD but tearlful & anxious  RESP: breathing comfortably on RA   AB: incision C/D/I  : no discharge/VB, string from Surgicel protruding - cut with suture removal kit      Pt currently asymptomatic and reassured.     Hattie March MD  OBGYN PGY3

## 2022-03-24 NOTE — PROGRESS NOTE ADULT - SUBJECTIVE AND OBJECTIVE BOX
Day 3 of Anesthesia Pain Management Service    SUBJECTIVE: I'm okay  Pain Scale Score:    [X] Refer to charted pain scores    THERAPY: Epidural Bupivacaine 0.01 % and Fentanyl 3 micrograms/mL     Demand Dose: 3 mL  Lockout: 15 minutes   Continuous Rate:  10 mL    MEDICATIONS  (STANDING):  ALBUTerol    90 MICROgram(s) HFA Inhaler 1 Puff(s) Inhalation every 6 hours  chlorhexidine 2% Cloths 1 Application(s) Topical daily  chlorhexidine 4% Liquid 1 Application(s) Topical <User Schedule>  famotidine    Tablet 20 milliGRAM(s) Oral daily  fentanyl (3 MICROgram(s)/mL) + bupivacaine 0.01% in 0.9% Sodium Chloride PCEA 250 milliLiter(s) Epidural PCA Continuous  heparin   Injectable 5000 Unit(s) SubCutaneous every 12 hours  levothyroxine Injectable 160 MICROGram(s) IV Push <User Schedule>  levothyroxine Injectable 80 MICROGram(s) IV Push <User Schedule>  loratadine 10 milliGRAM(s) Oral daily  pantoprazole    Tablet 40 milliGRAM(s) Oral before breakfast  polyethylene glycol 3350 17 Gram(s) Oral daily  senna 2 Tablet(s) Oral at bedtime    MEDICATIONS  (PRN):  dexAMETHasone  Injectable 4 milliGRAM(s) IV Push every 6 hours PRN Nausea  diphenhydrAMINE 25 milliGRAM(s) Oral every 4 hours PRN Pruritus  fentanyl (3 MICROgram(s)/mL) + bupivacaine 0.01% in 0.9% Sodium Chloride PCEA Rescue Clinician Bolus 5 milliLiter(s) Epidural every 15 minutes PRN Moderate Pain (4 - 6)  nalbuphine Injectable 2.5 milliGRAM(s) IV Push every 6 hours PRN Pruritus  naloxone Injectable 0.1 milliGRAM(s) IV Push every 3 minutes PRN For ANY of the following changes in patient status:  A. Breaths Per Minute LESS THAN 10, B. Oxygen saturation LESS THAN 90%, C. Sedation score of 6 for Stop After: 4 Times  simethicone 40 milliGRAM(s) Chew every 6 hours PRN Gas      OBJECTIVE:    Assessment of Epidural Catheter Site: 	    [X] Dressing intact	[X] Site non-tender	[X] Site without erythema, discharge, edema +leaking serous fluid  [X] Epidural tubing and connection checked	[X] Gross neurological exam within normal limits  [ ] Catheter removed – tip intact		    PT/INR - ( 24 Mar 2022 06:58 )   PT: 11.2 sec;   INR: 0.97 ratio         PTT - ( 24 Mar 2022 06:58 )  PTT:27.4 sec                      8.6    9.93  )-----------( 109      ( 24 Mar 2022 06:59 )             25.9     Vital Signs Last 24 Hrs  T(C): 37 (03-24-22 @ 05:29), Max: 37 (03-24-22 @ 05:29)  T(F): 98.6 (03-24-22 @ 05:29), Max: 98.6 (03-24-22 @ 05:29)  HR: 81 (03-24-22 @ 05:29) (80 - 93)  BP: 104/72 (03-24-22 @ 05:29) (101/71 - 114/78)  BP(mean): --  RR: 18 (03-24-22 @ 05:29) (18 - 18)  SpO2: 95% (03-24-22 @ 05:29) (95% - 98%)      Sedation Score:	[X] Alert	[ ] Drowsy	[ ] Arousable  [ ] Asleep  [ ] Unresponsive    Side Effects:	[X] None	[ ] Nausea	[ ] Vomiting  [ ] Pruritus  		[ ] Weakness  [ ] Numbness  [ ] Other:    ASSESSMENT/ PLAN:    Therapy:                         [X] Continue   [ ] Discontinue   [ ] Change to PRN Analgesics    Documentation and Verification of current medications:  [X] Done	[ ] Not done, not eligible, reason:    Comments: Endorsing good analgesia with PCEA. Site leaking serous fluid, dressing changed this am. Platelets 109K Anticoagulation status reviewed.   Plan to remove epidural secondary to leaking at site plus patient is tolerating regular diet. Transition to prn analgesics

## 2022-03-25 RX ADMIN — Medication 600 MILLIGRAM(S): at 06:05

## 2022-03-25 RX ADMIN — FAMOTIDINE 20 MILLIGRAM(S): 10 INJECTION INTRAVENOUS at 12:46

## 2022-03-25 RX ADMIN — OXYCODONE HYDROCHLORIDE 5 MILLIGRAM(S): 5 TABLET ORAL at 22:08

## 2022-03-25 RX ADMIN — Medication 600 MILLIGRAM(S): at 18:13

## 2022-03-25 RX ADMIN — OXYCODONE HYDROCHLORIDE 5 MILLIGRAM(S): 5 TABLET ORAL at 00:10

## 2022-03-25 RX ADMIN — Medication 975 MILLIGRAM(S): at 03:49

## 2022-03-25 RX ADMIN — Medication 975 MILLIGRAM(S): at 04:11

## 2022-03-25 RX ADMIN — Medication 975 MILLIGRAM(S): at 16:28

## 2022-03-25 RX ADMIN — SIMETHICONE 40 MILLIGRAM(S): 80 TABLET, CHEWABLE ORAL at 08:28

## 2022-03-25 RX ADMIN — LORATADINE 10 MILLIGRAM(S): 10 TABLET ORAL at 12:46

## 2022-03-25 RX ADMIN — Medication 600 MILLIGRAM(S): at 00:12

## 2022-03-25 RX ADMIN — Medication 975 MILLIGRAM(S): at 09:17

## 2022-03-25 RX ADMIN — Medication 975 MILLIGRAM(S): at 21:36

## 2022-03-25 RX ADMIN — Medication 600 MILLIGRAM(S): at 12:46

## 2022-03-25 RX ADMIN — Medication 600 MILLIGRAM(S): at 18:46

## 2022-03-25 RX ADMIN — Medication 600 MILLIGRAM(S): at 13:44

## 2022-03-25 RX ADMIN — Medication 600 MILLIGRAM(S): at 00:43

## 2022-03-25 RX ADMIN — Medication 975 MILLIGRAM(S): at 21:05

## 2022-03-25 RX ADMIN — PANTOPRAZOLE SODIUM 40 MILLIGRAM(S): 20 TABLET, DELAYED RELEASE ORAL at 07:52

## 2022-03-25 RX ADMIN — POLYETHYLENE GLYCOL 3350 17 GRAM(S): 17 POWDER, FOR SOLUTION ORAL at 15:54

## 2022-03-25 RX ADMIN — Medication 975 MILLIGRAM(S): at 08:28

## 2022-03-25 RX ADMIN — HEPARIN SODIUM 5000 UNIT(S): 5000 INJECTION INTRAVENOUS; SUBCUTANEOUS at 18:13

## 2022-03-25 RX ADMIN — Medication 975 MILLIGRAM(S): at 15:28

## 2022-03-25 RX ADMIN — SIMETHICONE 40 MILLIGRAM(S): 80 TABLET, CHEWABLE ORAL at 15:28

## 2022-03-25 RX ADMIN — SENNA PLUS 2 TABLET(S): 8.6 TABLET ORAL at 21:05

## 2022-03-25 RX ADMIN — HEPARIN SODIUM 5000 UNIT(S): 5000 INJECTION INTRAVENOUS; SUBCUTANEOUS at 06:05

## 2022-03-25 NOTE — PROGRESS NOTE ADULT - SUBJECTIVE AND OBJECTIVE BOX
OB Postpartum Note    Interval events: Patient seen and examined at bedside, no acute overnight events. No acute complaints. Pain well controlled with PO pain medications. She is ambulating, tolerating regular diet, urinating spontaneously, and passing flatus.    O:   Vitals:  Vital Signs Last 24 Hrs  T(C): 36.6 (25 Mar 2022 01:00), Max: 37 (24 Mar 2022 05:29)  T(F): 97.8 (25 Mar 2022 01:00), Max: 98.6 (24 Mar 2022 05:29)  HR: 78 (25 Mar 2022 01:00) (67 - 87)  BP: 105/70 (25 Mar 2022 01:00) (96/65 - 115/74)  BP(mean): --  RR: 18 (25 Mar 2022 01:00) (18 - 18)  SpO2: 96% (25 Mar 2022 01:00) (95% - 98%)    MEDICATIONS  (STANDING):  acetaminophen     Tablet .. 975 milliGRAM(s) Oral every 6 hours  ALBUTerol    90 MICROgram(s) HFA Inhaler 1 Puff(s) Inhalation every 6 hours  chlorhexidine 2% Cloths 1 Application(s) Topical daily  chlorhexidine 4% Liquid 1 Application(s) Topical <User Schedule>  famotidine    Tablet 20 milliGRAM(s) Oral daily  heparin   Injectable 5000 Unit(s) SubCutaneous every 12 hours  ibuprofen  Tablet. 600 milliGRAM(s) Oral every 6 hours  levothyroxine 200 MICROGram(s) Oral daily  loratadine 10 milliGRAM(s) Oral daily  pantoprazole    Tablet 40 milliGRAM(s) Oral before breakfast  polyethylene glycol 3350 17 Gram(s) Oral daily  senna 2 Tablet(s) Oral at bedtime    MEDICATIONS  (PRN):  nalbuphine Injectable 2.5 milliGRAM(s) IV Push every 6 hours PRN Pruritus  naloxone Injectable 0.1 milliGRAM(s) IV Push every 3 minutes PRN For ANY of the following changes in patient status:  A. Breaths Per Minute LESS THAN 10, B. Oxygen saturation LESS THAN 90%, C. Sedation score of 6 for Stop After: 4 Times  oxyCODONE    IR 5 milliGRAM(s) Oral every 6 hours PRN Severe Pain (7 - 10)  simethicone 40 milliGRAM(s) Chew every 6 hours PRN Gas      Labs:  Blood type: A Positive  Rubella IgG: RPR: Negative                          8.6<L>   9.93 >-----------< 109<L>    ( 03-24 @ 06:59 )             25.9<L>                        8.9<L>   11.50<H> >-----------< 88<L>    ( 03-23 @ 07:09 )             26.6<L>                        7.1<L>   9.21 >-----------< 54<L>    ( 03-23 @ 06:09 )             20.9<LL>                        9.2<L>   10.87<H> >-----------< 76<L>    ( 03-22 @ 18:59 )             26.9<L>                        9.4<L>   9.15 >-----------< 72<L>    ( 03-22 @ 06:27 )             26.9<L>    03-24-22 @ 06:58      133<L>  |  101  |  7   ----------------------------<  88  3.6   |  21<L>  |  0.74    03-23-22 @ 07:09      136  |  101  |  6<L>  ----------------------------<  100<H>  3.9   |  19<L>  |  0.58    03-23-22 @ 06:09      92<LL>  |  70<L>  |  5<L>  ----------------------------<  77  2.7<LL>   |  14<L>  |  <0.30<L>        Ca    8.0<L>      24 Mar 2022 06:58  Ca    7.5<L>      23 Mar 2022 07:09  Ca    5.5<LL>      23 Mar 2022 06:09  Phos  3.2     03-24  Phos  2.9     03-23  Phos  2.7     03-23  Mg     2.0     03-24  Mg     2.2     03-23  Mg     1.5<L>     03-23    TPro  5.3<L>  /  Alb  2.8<L>  /  TBili  0.3  /  DBili  x   /  AST  10  /  ALT  10  /  AlkPhos  59  03-24-22 @ 06:58  TPro  5.0<L>  /  Alb  2.5<L>  /  TBili  0.4  /  DBili  x   /  AST  14  /  ALT  8<L>  /  AlkPhos  51  03-23-22 @ 07:09  TPro  3.9<L>  /  Alb  1.9<L>  /  TBili  0.2  /  DBili  x   /  AST  12  /  ALT  8<L>  /  AlkPhos  39<L>  03-23-22 @ 06:09          PE:  General: NAD  Abdomen: mildly distended,appropriately tender, Fundus firm, incision c/d/i.  Extremities: SCDs in place, no erythema

## 2022-03-26 ENCOUNTER — TRANSCRIPTION ENCOUNTER (OUTPATIENT)
Age: 37
End: 2022-03-26

## 2022-03-26 VITALS
SYSTOLIC BLOOD PRESSURE: 105 MMHG | TEMPERATURE: 98 F | HEART RATE: 81 BPM | DIASTOLIC BLOOD PRESSURE: 68 MMHG | RESPIRATION RATE: 18 BRPM | OXYGEN SATURATION: 98 %

## 2022-03-26 LAB
ALBUMIN SERPL ELPH-MCNC: 3.1 G/DL — LOW (ref 3.3–5)
ALP SERPL-CCNC: 57 U/L — SIGNIFICANT CHANGE UP (ref 40–120)
ALT FLD-CCNC: 11 U/L — SIGNIFICANT CHANGE UP (ref 10–45)
ANION GAP SERPL CALC-SCNC: 12 MMOL/L — SIGNIFICANT CHANGE UP (ref 5–17)
APTT BLD: 28 SEC — SIGNIFICANT CHANGE UP (ref 27.5–35.5)
AST SERPL-CCNC: 10 U/L — SIGNIFICANT CHANGE UP (ref 10–40)
BILIRUB SERPL-MCNC: 0.2 MG/DL — SIGNIFICANT CHANGE UP (ref 0.2–1.2)
BUN SERPL-MCNC: 10 MG/DL — SIGNIFICANT CHANGE UP (ref 7–23)
CALCIUM SERPL-MCNC: 8.3 MG/DL — LOW (ref 8.4–10.5)
CHLORIDE SERPL-SCNC: 108 MMOL/L — SIGNIFICANT CHANGE UP (ref 96–108)
CO2 SERPL-SCNC: 21 MMOL/L — LOW (ref 22–31)
CREAT SERPL-MCNC: 0.79 MG/DL — SIGNIFICANT CHANGE UP (ref 0.5–1.3)
EGFR: 100 ML/MIN/1.73M2 — SIGNIFICANT CHANGE UP
GLUCOSE SERPL-MCNC: 105 MG/DL — HIGH (ref 70–99)
HCT VFR BLD CALC: 26.1 % — LOW (ref 34.5–45)
HGB BLD-MCNC: 8.6 G/DL — LOW (ref 11.5–15.5)
INR BLD: 0.87 RATIO — LOW (ref 0.88–1.16)
MAGNESIUM SERPL-MCNC: 2.1 MG/DL — SIGNIFICANT CHANGE UP (ref 1.6–2.6)
MCHC RBC-ENTMCNC: 30 PG — SIGNIFICANT CHANGE UP (ref 27–34)
MCHC RBC-ENTMCNC: 33 GM/DL — SIGNIFICANT CHANGE UP (ref 32–36)
MCV RBC AUTO: 90.9 FL — SIGNIFICANT CHANGE UP (ref 80–100)
NRBC # BLD: 0 /100 WBCS — SIGNIFICANT CHANGE UP (ref 0–0)
PHOSPHATE SERPL-MCNC: 3.8 MG/DL — SIGNIFICANT CHANGE UP (ref 2.5–4.5)
PLATELET # BLD AUTO: 191 K/UL — SIGNIFICANT CHANGE UP (ref 150–400)
PLATELET MAPPING ACTF MAX AMPLITUDE: 11.9 MM — SIGNIFICANT CHANGE UP (ref 2–19)
PLATELET MAPPING ADP MAXIMUM AMPLITUDE: SIGNIFICANT CHANGE UP MM (ref 45–69)
PLATELET MAPPING ADP PERCENT INHIBITION: SIGNIFICANT CHANGE UP % (ref 0–17)
PLATELET MAPPING HKH MAXIMUM AMPLITUDE: 55.8 MM — SIGNIFICANT CHANGE UP (ref 53–68)
POTASSIUM SERPL-MCNC: 3.5 MMOL/L — SIGNIFICANT CHANGE UP (ref 3.5–5.3)
POTASSIUM SERPL-SCNC: 3.5 MMOL/L — SIGNIFICANT CHANGE UP (ref 3.5–5.3)
PROT SERPL-MCNC: 5.7 G/DL — LOW (ref 6–8.3)
PROTHROM AB SERPL-ACNC: 10 SEC — LOW (ref 10.5–13.4)
RBC # BLD: 2.87 M/UL — LOW (ref 3.8–5.2)
RBC # FLD: 14.2 % — SIGNIFICANT CHANGE UP (ref 10.3–14.5)
SODIUM SERPL-SCNC: 141 MMOL/L — SIGNIFICANT CHANGE UP (ref 135–145)
WBC # BLD: 6.04 K/UL — SIGNIFICANT CHANGE UP (ref 3.8–10.5)
WBC # FLD AUTO: 6.04 K/UL — SIGNIFICANT CHANGE UP (ref 3.8–10.5)

## 2022-03-26 PROCEDURE — P9100: CPT

## 2022-03-26 PROCEDURE — 83735 ASSAY OF MAGNESIUM: CPT

## 2022-03-26 PROCEDURE — 97116 GAIT TRAINING THERAPY: CPT

## 2022-03-26 PROCEDURE — 86769 SARS-COV-2 COVID-19 ANTIBODY: CPT

## 2022-03-26 PROCEDURE — 86900 BLOOD TYPING SEROLOGIC ABO: CPT

## 2022-03-26 PROCEDURE — 36415 COLL VENOUS BLD VENIPUNCTURE: CPT

## 2022-03-26 PROCEDURE — 86985 SPLIT BLOOD OR PRODUCTS: CPT

## 2022-03-26 PROCEDURE — P9059: CPT

## 2022-03-26 PROCEDURE — 94002 VENT MGMT INPAT INIT DAY: CPT

## 2022-03-26 PROCEDURE — 85014 HEMATOCRIT: CPT

## 2022-03-26 PROCEDURE — 85384 FIBRINOGEN ACTIVITY: CPT

## 2022-03-26 PROCEDURE — 82803 BLOOD GASES ANY COMBINATION: CPT

## 2022-03-26 PROCEDURE — 84100 ASSAY OF PHOSPHORUS: CPT

## 2022-03-26 PROCEDURE — P9037: CPT

## 2022-03-26 PROCEDURE — 71045 X-RAY EXAM CHEST 1 VIEW: CPT

## 2022-03-26 PROCEDURE — 86850 RBC ANTIBODY SCREEN: CPT

## 2022-03-26 PROCEDURE — 82330 ASSAY OF CALCIUM: CPT

## 2022-03-26 PROCEDURE — 84295 ASSAY OF SERUM SODIUM: CPT

## 2022-03-26 PROCEDURE — 85396 CLOTTING ASSAY WHOLE BLOOD: CPT

## 2022-03-26 PROCEDURE — 97166 OT EVAL MOD COMPLEX 45 MIN: CPT

## 2022-03-26 PROCEDURE — 86965 POOLING BLOOD PLATELETS: CPT

## 2022-03-26 PROCEDURE — P9011: CPT

## 2022-03-26 PROCEDURE — 82947 ASSAY GLUCOSE BLOOD QUANT: CPT

## 2022-03-26 PROCEDURE — 59050 FETAL MONITOR W/REPORT: CPT

## 2022-03-26 PROCEDURE — 82565 ASSAY OF CREATININE: CPT

## 2022-03-26 PROCEDURE — 85610 PROTHROMBIN TIME: CPT

## 2022-03-26 PROCEDURE — 80053 COMPREHEN METABOLIC PANEL: CPT

## 2022-03-26 PROCEDURE — 94640 AIRWAY INHALATION TREATMENT: CPT

## 2022-03-26 PROCEDURE — 86901 BLOOD TYPING SEROLOGIC RH(D): CPT

## 2022-03-26 PROCEDURE — 97162 PT EVAL MOD COMPLEX 30 MIN: CPT

## 2022-03-26 PROCEDURE — 36430 TRANSFUSION BLD/BLD COMPNT: CPT

## 2022-03-26 PROCEDURE — 85730 THROMBOPLASTIN TIME PARTIAL: CPT

## 2022-03-26 PROCEDURE — 88302 TISSUE EXAM BY PATHOLOGIST: CPT

## 2022-03-26 PROCEDURE — 82435 ASSAY OF BLOOD CHLORIDE: CPT

## 2022-03-26 PROCEDURE — P9016: CPT

## 2022-03-26 PROCEDURE — 86780 TREPONEMA PALLIDUM: CPT

## 2022-03-26 PROCEDURE — P9012: CPT

## 2022-03-26 PROCEDURE — C1889: CPT

## 2022-03-26 PROCEDURE — 85027 COMPLETE CBC AUTOMATED: CPT

## 2022-03-26 PROCEDURE — 97530 THERAPEUTIC ACTIVITIES: CPT

## 2022-03-26 PROCEDURE — 88307 TISSUE EXAM BY PATHOLOGIST: CPT

## 2022-03-26 PROCEDURE — 86923 COMPATIBILITY TEST ELECTRIC: CPT

## 2022-03-26 PROCEDURE — 80048 BASIC METABOLIC PNL TOTAL CA: CPT

## 2022-03-26 PROCEDURE — 83605 ASSAY OF LACTIC ACID: CPT

## 2022-03-26 PROCEDURE — 85018 HEMOGLOBIN: CPT

## 2022-03-26 PROCEDURE — 82248 BILIRUBIN DIRECT: CPT

## 2022-03-26 PROCEDURE — C1765: CPT

## 2022-03-26 PROCEDURE — 84132 ASSAY OF SERUM POTASSIUM: CPT

## 2022-03-26 RX ORDER — ASPIRIN/CALCIUM CARB/MAGNESIUM 324 MG
2 TABLET ORAL
Qty: 0 | Refills: 0 | DISCHARGE

## 2022-03-26 RX ORDER — ACETAMINOPHEN 500 MG
3 TABLET ORAL
Qty: 0 | Refills: 0 | DISCHARGE
Start: 2022-03-26

## 2022-03-26 RX ORDER — LEVOTHYROXINE SODIUM 125 MCG
1 TABLET ORAL
Qty: 0 | Refills: 0 | DISCHARGE

## 2022-03-26 RX ORDER — IBUPROFEN 200 MG
1 TABLET ORAL
Qty: 0 | Refills: 0 | DISCHARGE
Start: 2022-03-26

## 2022-03-26 RX ADMIN — Medication 600 MILLIGRAM(S): at 05:40

## 2022-03-26 RX ADMIN — Medication 200 MICROGRAM(S): at 05:39

## 2022-03-26 RX ADMIN — Medication 600 MILLIGRAM(S): at 06:15

## 2022-03-26 RX ADMIN — Medication 600 MILLIGRAM(S): at 13:30

## 2022-03-26 RX ADMIN — HEPARIN SODIUM 5000 UNIT(S): 5000 INJECTION INTRAVENOUS; SUBCUTANEOUS at 05:40

## 2022-03-26 RX ADMIN — Medication 600 MILLIGRAM(S): at 11:44

## 2022-03-26 RX ADMIN — LORATADINE 10 MILLIGRAM(S): 10 TABLET ORAL at 11:44

## 2022-03-26 RX ADMIN — Medication 975 MILLIGRAM(S): at 09:24

## 2022-03-26 RX ADMIN — Medication 975 MILLIGRAM(S): at 10:29

## 2022-03-26 RX ADMIN — SIMETHICONE 40 MILLIGRAM(S): 80 TABLET, CHEWABLE ORAL at 08:14

## 2022-03-26 RX ADMIN — PANTOPRAZOLE SODIUM 40 MILLIGRAM(S): 20 TABLET, DELAYED RELEASE ORAL at 08:14

## 2022-03-26 RX ADMIN — FAMOTIDINE 20 MILLIGRAM(S): 10 INJECTION INTRAVENOUS at 11:44

## 2022-03-26 NOTE — DISCHARGE NOTE OB - CARE PROVIDERS DIRECT ADDRESSES
[email or web address]

## 2022-03-26 NOTE — DISCHARGE NOTE OB - NS MD DC FALL RISK RISK
For information on Fall & Injury Prevention, visit: [email or web address] OR  [email or web address] OR  https://www.cdc.gov/steadi/patient.html

## 2022-03-26 NOTE — PROGRESS NOTE ADULT - ASSESSMENT
36y  now POD#3 status post rCS, RAMEZ, bilateral ureterolysis, bilateral uterine artery ligation, & cystoscopy under GETA for known placenta accreta and previa (EBL 6L), procedure complicated by  MTP, pt status post 13u pRBC, 12u FFP, 2u Cryo, & 2Plt with an appropriate response. She is hemodynamically stable and doing well post operatively.     #Neuro: pain well controlled w/ current regimen PCEA and Tylenol - advance to IV/PO regimen as tolerated   #CV: Hemodynamically stable, f/u AM labs  #Pulm: ROS and PE unremarkable,   #ID: Afebrile, no signs or sx of infection, on Invanz until POD#3  #GI: tolerating Reg diet   #: spontaneously voiding  #FEN: replete electrolytes prn   #Heme: HSQ, SCDs while in bed, and early ambulation encouraged for DVT prophylaxis    RFrankel PGY3  
36y  now POD#4 status post rCS, RAMEZ, bilateral ureterolysis, bilateral uterine artery ligation, & cystoscopy under GETA for known placenta accreta and previa (EBL 6L), procedure complicated by  MTP, pt status post 13u pRBC, 12u FFP, 2u Cryo, & 2 Plt with an appropriate response. She is hemodynamically stable and doing well post operatively.     #Neuro: pain well controlled w/ current regimen, Tylenol, motrin, Oxy PRN  #CV: Hemodynamically stable  #Pulm: ROS and PE unremarkable,   #ID: Afebrile, no signs or sx of infection, s/p Invanz  #GI: tolerating Reg diet   #: spontaneously voiding  #FEN: SLIV  #Heme: HSQ, SCDs while in bed, and early ambulation encouraged for DVT prophylaxis    RFrankel PGY3  
36y  now POD#5 status post rCS, RAMEZ, bilateral ureterolysis, bilateral uterine artery ligation, & cystoscopy under GETA for known placenta accreta and previa (EBL 6L), procedure complicated by  MTP, pt status post 13u pRBC, 12u FFP, 2u Cryo, & 2 Plt with an appropriate response. She is hemodynamically stable and doing well post operatively.     #Neuro: pain well controlled w/ current regimen, Tylenol, motrin, Oxy PRN  #CV: Hemodynamically stable  #Pulm: ROS and PE unremarkable,   #ID: Afebrile, no signs or sx of infection, s/p Invanz  #GI: tolerating Reg diet   #: spontaneously voiding  #FEN: SLIV  #Heme: HSQ, SCDs while in bed, and early ambulation encouraged for DVT prophylaxis    RFrankel PGY3
Ms. Prasad is a 36y  now POD#2 status post rCS, RAMEZ, bilateral ureterolysis, bilateral uterine artery ligation, & cystoscopy under GETA for known placenta accreta and previa (EBL 6L), procedure complicated by  MTP, pt status post 13u pRBC, 12u FFP, 2u Cryo, & 2Plt with an appropriate response. She remains in SICU although has been listed for the floor. She is now extubated, ambulating, reports good pain control and is clinically and hemodynamically stable.     #Neuro: pain well controlled w/ current regimen PCEA - advance to IV/PO regimen as tolerated   #CV: Hemodynamically stable, 8.9/26.6  #Pulm: ROS and PE unremarkable,   #ID: Afebrile, no signs or sx of infection, on Invanz until POD#3  #GI: tolerating Reg diet   #: UOP adequate overnight. Funk in place draining pink tinged urine - to be removed today  #FEN: Plasmalyte @ 75ml/hr. replete electrolytes prn   #Heme: HSQ, SCDs while in bed, and early ambulation encouraged for DVT prophylaxis    Appreciate excellent SICU care, anticipate transfer to floor today.      Hattie March MD  OBGYN PGY3
37yo F  with h/o asthma, hypothyroidism, laparoscopic myomectomy and prior C/S presenting for a scheduled  hysterectomy for a known placenta accreta now s/p exlap,  hysterectomy, bilateral salpingectomy, ureterolysis and cystoscopy complicated by hemorrhage of 6L requiring MTP. SICU consulted for postop monitoring.     Neurologic  h/o anxiety  - Pain control with IV tylenol and fentanyl PCEA    Respiratory  h/o asthma  - extubated 3/21  - Inhalers PRN    Cardiovascular    no active issues  - MAP goal >65  - Required levo/helen gtt during OR, now weaned off  - Lactate cleared     Gastrointestinal   - Diet: regular  - C/w home protonix    Gastrointestinal   - Trend electrolytes  - Monitor UOP, massey in place  - Plasmalyte @ 75ml/hr  - Pad counts to assess for bleeding from vaginal cuff    ID:   - Trend WBC, fever curve  - s/p vanco and flagyl in OR (h/o C. diff)  - Invanz ppx    HEME: hemorrhage  - Total 13u PRBC, 12u FFP, 2u cryo, 2u plt, TXA 1  - Ok for DVT ppx with SQH when H&H stable  - Check stat CBC, coags, fibrinogen  - Trend labs q6hr - stable     ENDO: h/o hypothyroidism  - Monitor glucose on BMP  - C/w home synthroid (IV dose)    TUBES/LINES/DRAINS:  - RIJ double lumen cordis  - 18G PIV x2  - R radial a-line  - 5 English LUE    DISPO: SICU   
Ms. Prasad is a 36y  now POD#1 status post rCS, RAMEZ, bilateral ureterolysis, bilateral uterine artery ligation, & cystoscopy under GETA for known placenta accreta and previa (EBL 6L), procedure complicated by  MTP, pt status post 13u pRBC, 12u FFP, 2u Cryo, & 2Plt with an appropriate response. She is remains in SICU for closer postoperative hemodynamic monitoring, she is now extubated and is clinically and hemodynamically stable.     #Neuro: pain well controlled w/ current regimen PCEA - advance to IV/PO regimen as tolerated   #CV: Hemodynamically stable, 9.4/26.9, labs per SICU  #Pulm: ROS and PE unremarkable, pt encouraged to use incentive spirometer  #ID: Afebrile, no signs or sx of infection, on Invanz  #GI: tolerating CLD diet w/o n/v, Reg diet ordered  #: UOP adequate overnight (1.1L) Funk in place draining clear urine - no longer pink tinged   #FEN: Plasmalyte @ 75ml/hr. replete electrolytes prn   #Heme: HSQ when stable, SCDs while in bed, and early ambulation encouraged for DVT prophylaxis    Appreciate excellent SICU care.      Hattie March MD  OBGYN PGY3
35yo F  with h/o asthma, hypothyroidism, laparoscopic myomectomy and prior C/S presenting for a scheduled  hysterectomy for a known placenta accreta now s/p exlap,  hysterectomy, bilateral salpingectomy, ureterolysis and cystoscopy complicated by hemorrhage of 6L requiring MTP. SICU consulted for postop monitoring.     Neurologic  h/o anxiety, acute pain  - Pain control with IV tylenol and fentanyl PCEA    Respiratory  h/o asthma  - extubated 3/21    Cardiovascular    no active issues  - MAP goal >65  - Required levo/helen gtt during OR, now weaned off  - Lactate cleared     Gastrointestinal   - Diet: regular  - C/w home protonix  - Bowel regimen with senna and miralax, simethicone prn gas pain    Gastrointestinal   - Trend electrolytes  - Monitor UOP, massey in place  - Plasmalyte @ 75ml/hr, IVL when PO intake improves  - Pad counts to assess for bleeding from vaginal cuff    ID:   - Trend WBC, fever curve  - S/p vanco and flagyl in OR (h/o C. diff)  - S/p Invanz ppx    HEME: hemorrhage  - Total 13u PRBC, 12u FFP, 2u cryo, 2u plt, TXA 1  - HSq for VTE ppx  - Check stat CBC, coags, fibrinogen  - Trend labs q6hr - stable     ENDO: h/o hypothyroidism  - Monitor glucose on BMP  - C/w home synthroid (IV dose)    DISPO: SICU, stable for transfer to floor    Code Status: Full code

## 2022-03-26 NOTE — DISCHARGE NOTE OB - CARE PROVIDER_API CALL
Lindsey Yousif)  OBSGYN  [address] [facility], [place] [address]  Phone: [phone]  Fax: [phone]  Follow Up Time:

## 2022-03-26 NOTE — DISCHARGE NOTE OB - CARE PLAN
Principal Discharge DX:	Delivery by  hysterectomy  Assessment and plan of treatment:	Follow up in the office in 2 weeks. Call the office to schedule an appointment.   1

## 2022-03-26 NOTE — DISCHARGE NOTE OB - HOSPITAL COURSE
Pt admitted for a planned  hysterectomy at 34 weeks for placenta acreta. Pt had  hysterectomy with bilateral slapingectomy. Pt had EBL 6L. She received 13 units PRBCs, 12 units FFP, 2 cryo, 2 plts. She went to the SICU. She received Invanz postoperative. On postoperative day 5 pt met criteria for discharge home. Pt to follow up in the office in 6 weeks.

## 2022-03-26 NOTE — PROGRESS NOTE ADULT - SUBJECTIVE AND OBJECTIVE BOX
OB Postpartum Note    Interval events: Patient seen and examined at bedside, no acute overnight events. No acute complaints. Pain well controlled with PO pain medications. She is ambulating, tolerating regular diet, urinating spontaneously, and passing flatus.    O:   Vitals:  Vital Signs Last 24 Hrs  T(C): 36.6 (26 Mar 2022 00:33), Max: 36.8 (25 Mar 2022 21:00)  T(F): 97.8 (26 Mar 2022 00:33), Max: 98.2 (25 Mar 2022 21:00)  HR: 71 (26 Mar 2022 00:33) (71 - 76)  BP: 107/71 (26 Mar 2022 00:33) (98/80 - 114/72)  BP(mean): --  RR: 18 (26 Mar 2022 00:33) (18 - 18)  SpO2: 96% (26 Mar 2022 00:33) (96% - 99%)    MEDICATIONS  (STANDING):  acetaminophen     Tablet .. 975 milliGRAM(s) Oral every 6 hours  ALBUTerol    90 MICROgram(s) HFA Inhaler 1 Puff(s) Inhalation every 6 hours  chlorhexidine 2% Cloths 1 Application(s) Topical daily  chlorhexidine 4% Liquid 1 Application(s) Topical <User Schedule>  famotidine    Tablet 20 milliGRAM(s) Oral daily  heparin   Injectable 5000 Unit(s) SubCutaneous every 12 hours  ibuprofen  Tablet. 600 milliGRAM(s) Oral every 6 hours  levothyroxine 200 MICROGram(s) Oral daily  loratadine 10 milliGRAM(s) Oral daily  pantoprazole    Tablet 40 milliGRAM(s) Oral before breakfast  polyethylene glycol 3350 17 Gram(s) Oral daily  senna 2 Tablet(s) Oral at bedtime    MEDICATIONS  (PRN):  nalbuphine Injectable 2.5 milliGRAM(s) IV Push every 6 hours PRN Pruritus  naloxone Injectable 0.1 milliGRAM(s) IV Push every 3 minutes PRN For ANY of the following changes in patient status:  A. Breaths Per Minute LESS THAN 10, B. Oxygen saturation LESS THAN 90%, C. Sedation score of 6 for Stop After: 4 Times  oxyCODONE    IR 5 milliGRAM(s) Oral every 6 hours PRN Severe Pain (7 - 10)  simethicone 40 milliGRAM(s) Chew every 6 hours PRN Gas      Labs:  Blood type: A Positive  Rubella IgG: RPR: Negative                          8.6<L>   9.93 >-----------< 109<L>    ( 03-24 @ 06:59 )             25.9<L>                        8.9<L>   11.50<H> >-----------< 88<L>    ( 03-23 @ 07:09 )             26.6<L>                        7.1<L>   9.21 >-----------< 54<L>    ( 03-23 @ 06:09 )             20.9<LL>    03-24-22 @ 06:58      133<L>  |  101  |  7   ----------------------------<  88  3.6   |  21<L>  |  0.74    03-23-22 @ 07:09      136  |  101  |  6<L>  ----------------------------<  100<H>  3.9   |  19<L>  |  0.58    03-23-22 @ 06:09      92<LL>  |  70<L>  |  5<L>  ----------------------------<  77  2.7<LL>   |  14<L>  |  <0.30<L>        Ca    8.0<L>      24 Mar 2022 06:58  Ca    7.5<L>      23 Mar 2022 07:09  Ca    5.5<LL>      23 Mar 2022 06:09  Phos  3.2     03-24  Phos  2.9     03-23  Phos  2.7     03-23  Mg     2.0     03-24  Mg     2.2     03-23  Mg     1.5<L>     03-23    TPro  5.3<L>  /  Alb  2.8<L>  /  TBili  0.3  /  DBili  x   /  AST  10  /  ALT  10  /  AlkPhos  59  03-24-22 @ 06:58  TPro  5.0<L>  /  Alb  2.5<L>  /  TBili  0.4  /  DBili  x   /  AST  14  /  ALT  8<L>  /  AlkPhos  51  03-23-22 @ 07:09  TPro  3.9<L>  /  Alb  1.9<L>  /  TBili  0.2  /  DBili  x   /  AST  12  /  ALT  8<L>  /  AlkPhos  39<L>  03-23-22 @ 06:09          PE:  General: NAD  Abdomen: mildly distended,appropriately tender, Fundus firm, incision c/d/i.  Extremities: SCDs in place, no erythema

## 2022-03-26 NOTE — DISCHARGE NOTE OB - INSTRUCTIONS
Keep your follow up appointment with doctor as instructed and call doctor with any questions or concerns.

## 2022-03-26 NOTE — DISCHARGE NOTE OB - PATIENT PORTAL LINK FT
You can access the FollowMyHealth Patient Portal offered by [facility] by registering at the following website: [email or web address]. By joining [employer]’s FollowMyHealth portal, you will also be able to view your health information using other applications (apps) compatible with our system.

## 2022-03-26 NOTE — DISCHARGE NOTE OB - MEDICATION SUMMARY - MEDICATIONS TO TAKE
I will START or STAY ON the medications listed below when I get home from the hospital:    rizatriptan 5 mg oral tablet  -- 1 tab(s) by mouth once a day, As Needed  -- Indication: For migraines    acetaminophen 325 mg oral tablet  -- 3 tab(s) by mouth every 6 hours  -- Indication: For Pain    ibuprofen 600 mg oral tablet  -- 1 tab(s) by mouth every 6 hours  -- Indication: For Pain    Claritin 10 mg oral tablet  -- 1 tab(s) by mouth once a day  -- Indication: For allergies    ProAir HFA 90 mcg/inh inhalation aerosol  -- 2 puff(s) inhaled every 6 hours, As Needed  -- Indication: For asthma    Pepcid 40 mg oral tablet  -- 1 tab(s) by mouth once a day (at bedtime)  -- Indication: For reflux    Prena1 oral capsule  -- 1 cap(s) by mouth once a day  -- Indication: For nutritional supplement    Rhinocort 32 mcg/inh nasal aerosol with adapter  -- nasal once a day  -- Indication: For allergies    Protonix 40 mg oral delayed release tablet  -- 1 tab(s) by mouth once a day  -- Indication: For reflux    levothyroxine 100 mcg (0.1 mg) oral tablet  -- 1 tab(s) by mouth once a day on Sunday  -- Indication: For hypothyroid    Vitamin D3 2000 intl units (50 mcg) oral tablet  -- 2 tab(s) by mouth once a day  -- Indication: For nutritional supplement

## 2022-03-26 NOTE — PROGRESS NOTE ADULT - ATTENDING COMMENTS
Pain Management Attending Addendum    SUBJECTIVE: Patient doing well with PCEA    Therapy:    [X] PCEA    OBJECTIVE:   [X] Pain appropriately controlled    [ ] Other:    Side Effects:  [X] None	             [ ] Nausea              [ ] Pruritis        [ ] Weakness          [ ] Numbness        	[ ] Other:    ASSESSMENT/PLAN:    [X] Continue current therapy    [ ] Therapy changed to:    [ ] PRN Analgesics   [ ] IV PCA    Comments:
Patient seen and examined. Agree with above.
35yo F  with h/o asthma, hypothyroidism, laparoscopic myomectomy and prior C/S presenting for a scheduled  hysterectomy for a known placenta accreta now s/p exlap,  hysterectomy, bilateral salpingectomy, ureterolysis and cystoscopy complicated by hemorrhage of 6L requiring MTP. Patient transferred to SICU intubated.  EBL 6L, transfused PRBC 13U, FFP 12U, Cryo 2U, Plt 2U, TXA 1    S/p extubated, saturating well on RA  Awake and alert, pain control with PCEA  Hemodynamically off pressure  Regular diet  IVF  HCT platelets have been stable  Can start pharm DVT ppx  OOB  Deline
36y  now POD#2 status post rCS, RAMEZ, bilateral ureterolysis, bilateral uterine artery ligation, & cystoscopy under GETA for known placenta accreta and previa (EBL 6L), recently transferred from SICU  d/c Funk  pain control  increase OOB    Shivani Ayala MD
Patient seen and examined. Agree with above.

## 2022-03-29 ENCOUNTER — NON-APPOINTMENT (OUTPATIENT)
Age: 37
End: 2022-03-29

## 2022-03-29 ENCOUNTER — APPOINTMENT (OUTPATIENT)
Dept: OBGYN | Facility: CLINIC | Age: 37
End: 2022-03-29
Payer: COMMERCIAL

## 2022-03-29 VITALS — DIASTOLIC BLOOD PRESSURE: 70 MMHG | SYSTOLIC BLOOD PRESSURE: 108 MMHG | HEIGHT: 64 IN

## 2022-03-29 PROCEDURE — 0503F POSTPARTUM CARE VISIT: CPT

## 2022-03-29 NOTE — HISTORY OF PRESENT ILLNESS
[Postpartum Follow Up] : postpartum follow up [Delivery Date: ___] : on [unfilled] [Repeat C/S] : delivered by  section (repeat) [Female] : Delivery History: baby girl [None] : No associated symptoms are reported [Clean/Dry/Intact] : clean, dry and intact [Normal] : the vagina was normal [FreeTextEntry8] : 36 year old s/p c  hysterectomy, bialteral salpingectomy for accreta c/w EBL6L s/p MTPx 2 (13 units PRBC in total)  [de-identified] : bottle feeding  [de-identified] : c/o vaginal bleeding started today, dark brown,states clots, using about 4-5 pads a day,not fully soaked  [de-identified] : midline vertical on exam dark brown blood noted in vault no active bleeding  [de-identified] : CBC today, to start Fe, to keep apt for next week, discussed strict VB precautions    Lindsey Yousif MD

## 2022-03-29 NOTE — HISTORY OF PRESENT ILLNESS
[Postpartum Follow Up] : postpartum follow up [Delivery Date: ___] : on [unfilled] [Repeat C/S] : delivered by  section (repeat) [Female] : Delivery History: baby girl [None] : No associated symptoms are reported [Clean/Dry/Intact] : clean, dry and intact [Normal] : the vagina was normal [FreeTextEntry8] : 36 year old s/p c  hysterectomy, bialteral salpingectomy for accreta c/w EBL6L s/p MTPx 2 (13 units PRBC in total)  [de-identified] : bottle feeding  [de-identified] : c/o vaginal bleeding started today, dark brown,states clots, using about 4-5 pads a day,not fully soaked  [de-identified] : midline vertical on exam dark brown blood noted in vault no active bleeding  [de-identified] : CBC today, to start Fe, to keep apt for next week, discussed strict VB precautions    Lindsey Yousif MD

## 2022-03-30 ENCOUNTER — NON-APPOINTMENT (OUTPATIENT)
Age: 37
End: 2022-03-30

## 2022-03-30 PROBLEM — O43.219 PLACENTA ACCRETA, UNSPECIFIED TRIMESTER: Chronic | Status: ACTIVE | Noted: 2022-03-18

## 2022-03-30 PROBLEM — J45.909 UNSPECIFIED ASTHMA, UNCOMPLICATED: Chronic | Status: ACTIVE | Noted: 2020-08-27

## 2022-03-30 LAB
BASOPHILS # BLD AUTO: 0.05 K/UL
BASOPHILS NFR BLD AUTO: 0.6 %
EOSINOPHIL # BLD AUTO: 0.39 K/UL
EOSINOPHIL NFR BLD AUTO: 4.4 %
HCT VFR BLD CALC: 31.7 %
HGB BLD-MCNC: 10.2 G/DL
IMM GRANULOCYTES NFR BLD AUTO: 1 %
LYMPHOCYTES # BLD AUTO: 1.59 K/UL
LYMPHOCYTES NFR BLD AUTO: 17.9 %
MAN DIFF?: NORMAL
MCHC RBC-ENTMCNC: 29.6 PG
MCHC RBC-ENTMCNC: 32.2 GM/DL
MCV RBC AUTO: 91.9 FL
MONOCYTES # BLD AUTO: 0.71 K/UL
MONOCYTES NFR BLD AUTO: 8 %
NEUTROPHILS # BLD AUTO: 6.07 K/UL
NEUTROPHILS NFR BLD AUTO: 68.1 %
PLATELET # BLD AUTO: 414 K/UL
RBC # BLD: 3.45 M/UL
RBC # FLD: 14.4 %
WBC # FLD AUTO: 8.9 K/UL

## 2022-04-04 ENCOUNTER — APPOINTMENT (OUTPATIENT)
Dept: OBGYN | Facility: CLINIC | Age: 37
End: 2022-04-04
Payer: COMMERCIAL

## 2022-04-04 PROCEDURE — 0503F POSTPARTUM CARE VISIT: CPT

## 2022-04-04 NOTE — HISTORY OF PRESENT ILLNESS
[Delivery Date: ___] : on [unfilled] [Repeat C/S] : delivered by  section (repeat) [Female] : Delivery History: baby girl [Wt. ___] : weighing [unfilled] [Currently Experiencing] : The patient is currently experiencing symptoms. [None] : No associated symptoms are reported [Clean/Dry/Intact] : clean, dry and intact [Healed] : healed [Doing Well] : is doing well [No Sign of Infection] : is showing no signs of infection [Breastfeeding] : not currently nursing [de-identified] : ROBER teresa  [Abdominal Pain] : no abdominal pain [Back Pain] : no back pain [Suicidal Ideation] : no suicidal ideation [Erythema] : not erythematous [Swelling] : not swollen [FreeTextEntry8] : 3/21/22  hysterectomy  inc check AP/JS  [FreeTextEntry9] : gregorio  [de-identified] : states vaginal bleeding improved  [de-identified] : midline vertical incision cleaned, steris removed  [de-identified] : cont to monitor bleeding 2 week repeat visit Lindsey Yousif MD  [FreeTextEntry1] : BERNARD CHRISTIANSONI 36 year old F pt presents for post-partum visit. Pt c/o headaches due to pain from IV line,  takes Tylenol with relief. \par \par -RTO 2 weeks for follow-up. \par

## 2022-04-04 NOTE — END OF VISIT
[FreeTextEntry3] : I, Daniella Njboris acted as a scribe on behalf of Dr. Yousif on 04/04/2022 \par \par All medical entries made by the scribe were at my, Dr. Yousif, direction and personally dictated by me on 04/04/2022. I have reviewed the chart and agree that the record accurately reflects my personal performance of the history, physical exam, assessment and plan. I have also personally directed, reviewed, and agreed with the chart.\par

## 2022-04-12 LAB — SURGICAL PATHOLOGY STUDY: SIGNIFICANT CHANGE UP

## 2022-04-20 ENCOUNTER — APPOINTMENT (OUTPATIENT)
Dept: OBGYN | Facility: CLINIC | Age: 37
End: 2022-04-20
Payer: COMMERCIAL

## 2022-04-20 VITALS — HEIGHT: 64 IN

## 2022-04-20 DIAGNOSIS — F41.9 ANXIETY DISORDER, UNSPECIFIED: ICD-10-CM

## 2022-04-20 DIAGNOSIS — F32.A ANXIETY DISORDER, UNSPECIFIED: ICD-10-CM

## 2022-04-20 PROCEDURE — 0503F POSTPARTUM CARE VISIT: CPT

## 2022-04-20 NOTE — HISTORY OF PRESENT ILLNESS
[Delivery Date: ___] : on [unfilled] [None] : No associated symptoms are reported [Dehiscence] : dehisced [Doing Well] : is doing well [No Sign of Infection] : is showing no signs of infection [Excellent Pain Control] : has excellent pain control [Chills] : no chills [Dysuria] : no dysuria [Fever] : no fever [Erythema] : not erythematous [Swelling] : not swollen [FreeTextEntry8] : 3/21/22 c hysterectomy inc check AP/JS. [de-identified] : denies any sx of mastitis or depression [de-identified] : midline incision small area of suture poking through the skin at inferior aspect of incision, suture removed. fascia intact, wound probed, not deep, no evidence of infection. culture taken. on sterile speculum exam, wnl. cuff intact.  [de-identified] : pelvic rest, routine postop care, rto in 2 weeks  [FreeTextEntry1] : 35 yo s/p repeat  section, total abdominal hysterectomy, bilateral salpingectomy, bilateral  uterine artery ligation, bilateral ureterolysis, cystoscopy on 3/21/22. Pt had hemorrhage, 6L blood loss requiring 13 units of PRBCs/MTP and ICU admission.\par \par \par FINDINGS:Gravid uterus measuring 34 weeks' size consistent with gestational age.  Uterus with complete anterior  previa and accreta.  A fundal incision was made for the hysterotomy and the  was delivered in breech position.  Delayed cord clamping was performed and the  was handed to the NICU team.  The accreta was noted to be completely anterior and  extending into the cervical canal.  Bilateral fallopian tubes and ovaries grossly normal.  Bilateral pelvic sidewalls were opened for the ureterolysis part of the procedure. Bilateral uterine arteries were clipped.   At the end of the case, Fibrillar and Vistaseal placed over the operative sites and Surgicel was placed over the vaginal cuff. On cystoscopy bladder intake and b/l UOs seen with efflux of urine. \par \par Plan:\par s/p c-hyst\par -routine postop care\par -pelvic rest\par -rto 2 weeks\par skin dehiscence\par -, daily dressing change\par -rto 2 weeks, monitor for signs of infection\par \par anxiety\par -restart Fluoxetine 10 mg daily, titrate up to appropriate dose\par -pt sees therapist\par -precautions reviewed\par -denies HI/SI

## 2022-04-20 NOTE — END OF VISIT
[FreeTextEntry3] : I, Mireya Ignacio, acted as a scribe on behalf of Dr. Dale Pete on 04/20/2022. \par \par All medical entries made by the scribe where at my, Dr. Dale Pete, direction and personally dictated by me on 04/20/2022. I have reviewed the chart and agree that the record accurately reflects my personal performance of the history, physical exam, assessment, and plan. I have also personally directed, reviewed and agreed with the chart.

## 2022-04-22 RX ORDER — AMOXICILLIN AND CLAVULANATE POTASSIUM 875; 125 MG/1; MG/1
875-125 TABLET, COATED ORAL
Qty: 14 | Refills: 2 | Status: ACTIVE | COMMUNITY
Start: 2022-04-22 | End: 1900-01-01

## 2022-04-25 NOTE — H&P PST ADULT - EXTREMITIES
[email or web address] 37.5 detailed exam

## 2022-04-26 LAB — BACTERIA SPEC CULT: ABNORMAL

## 2022-04-26 RX ORDER — CEPHALEXIN 500 MG/1
500 TABLET ORAL 3 TIMES DAILY
Qty: 21 | Refills: 1 | Status: ACTIVE | COMMUNITY
Start: 2022-04-26 | End: 1900-01-01

## 2022-04-27 ENCOUNTER — NON-APPOINTMENT (OUTPATIENT)
Age: 37
End: 2022-04-27

## 2022-05-02 ENCOUNTER — APPOINTMENT (OUTPATIENT)
Dept: OBGYN | Facility: CLINIC | Age: 37
End: 2022-05-02
Payer: COMMERCIAL

## 2022-05-02 VITALS
DIASTOLIC BLOOD PRESSURE: 68 MMHG | BODY MASS INDEX: 33.12 KG/M2 | WEIGHT: 194 LBS | HEIGHT: 64 IN | SYSTOLIC BLOOD PRESSURE: 99 MMHG

## 2022-05-02 PROCEDURE — 0503F POSTPARTUM CARE VISIT: CPT

## 2022-05-02 NOTE — HISTORY OF PRESENT ILLNESS
[Delivery Date: ___] : on [unfilled] [Postpartum Follow Up] : postpartum follow up [Primary C/S] : delivered by  section [Healed] : healed [Back to Normal] : is back to normal in size [Normal] : the vagina was normal [Not Done] : Examination of breasts not done [Doing Well] : is doing well [No Sign of Infection] : is showing no signs of infection [Excellent Pain Control] : has excellent pain control [Complications:___] : no complications [Breastfeeding] : not currently nursing [None] : The patient is currently asymptomatic [S/Sx PP Depression] : no signs/symptoms of postpartum depression [Erythema] : not erythematous [Cervix Sample Taken] : cervical sample not taken for a Pap smear [de-identified] : Repeat C/S, total abdominal hysterectomy, bilateral salpingectomy, bilateral uterine artery ligation, bilateral ureterolysis, cystoscopy on 3/21/22. [de-identified] : Pt reports mild brown spotting no bright red bleeding  [de-identified] : inc healign well, no drainage  [de-identified] : pelvic rest x6 more weeks, can return to activity to finish out abx course Lindsey Yousif MD

## 2022-07-26 ENCOUNTER — RX CHANGE (OUTPATIENT)
Age: 37
End: 2022-07-26

## 2022-07-29 ENCOUNTER — RX CHANGE (OUTPATIENT)
Age: 37
End: 2022-07-29

## 2022-07-29 RX ORDER — FLUOXETINE HYDROCHLORIDE 10 MG/1
10 CAPSULE ORAL
Qty: 90 | Refills: 3 | Status: ACTIVE | COMMUNITY
Start: 2022-04-20 | End: 1900-01-01

## 2022-08-02 ENCOUNTER — APPOINTMENT (OUTPATIENT)
Dept: OBGYN | Facility: CLINIC | Age: 37
End: 2022-08-02

## 2022-08-10 ENCOUNTER — APPOINTMENT (OUTPATIENT)
Dept: OBGYN | Facility: CLINIC | Age: 37
End: 2022-08-10

## 2022-08-10 VITALS
HEIGHT: 64 IN | SYSTOLIC BLOOD PRESSURE: 100 MMHG | BODY MASS INDEX: 30.73 KG/M2 | DIASTOLIC BLOOD PRESSURE: 68 MMHG | WEIGHT: 180 LBS

## 2022-08-10 DIAGNOSIS — N93.9 ABNORMAL UTERINE AND VAGINAL BLEEDING, UNSPECIFIED: ICD-10-CM

## 2022-08-10 PROCEDURE — 99213 OFFICE O/P EST LOW 20 MIN: CPT

## 2022-08-10 NOTE — PLAN
[FreeTextEntry1] : 38 y/o  female, vaginal spotting\par \par polypoid granulation tissue\par -cauterized with silver nitrate\par -rto in 3 weeks to asses vaginal cuff/granulation tissue and gyn annual

## 2022-08-10 NOTE — PHYSICAL EXAM
[Labia Majora] : normal [Labia Minora] : normal [Normal] : normal [Uterine Adnexae] : normal [Soft] : soft [Non-tender] : non-tender [Non-distended] : non-distended [Absent] : absent [FreeTextEntry4] : Vaginal cuff intact. small, less than 1cm polypoid granulation tissue seen mid cuff, cauterized with silver nitrate.

## 2022-08-10 NOTE — END OF VISIT
[FreeTextEntry3] : I, Mireya Devon acted as a scribe on behalf of Dr. Dale Pete on 08/10/2022 .\par \par All medical entries made by the scribe were at my, Dr. Dale Pete, direction and personally dictated by me on 08/10/2022. I have reviewed the chart and agree that the record accurately reflects my personal performance of the history, physical exam, assessment and plan. I have also personally directed, reviewed, and agreed with the chart.\par

## 2022-08-10 NOTE — HISTORY OF PRESENT ILLNESS
[FreeTextEntry1] : 36 y/o  female presents for vaginal spotting. Pt s/p  and planned RAMEZ, BS, bilateral uterine artery ligation, bilateral ureterolysis, cystoscopy on 3/21/22 due to placenta accreta and previa. Pt reports pinkish spotting last week. \par \par GynHx:endometriosis, female infertility, placenta accreta, vaginitis, abn pap, fibroids, ovarian cyst, \par ObHx: c/s (3/21/22)\par PmHx: asthma, dysuria, GERD, hemorrhoid, hypothyroid, anxiety\par PsHx: TLH, BS, bilateral uterine artery ligation, bilateral ureterolysis, cystoscopy (3/21/22), Adenoidectomy, D&c, tonsillectomy, lSC removal of fibroids, foot surgeyr. \par FamHx: hypothyroidism (mother, father, brother)\par Current meds: Levothyroxine Sodium, Sertraline, Doxylamine-Pyridoxine, FLUoxetine HCl\par NKDA

## 2022-08-30 NOTE — H&P PST ADULT - NSANTHOSAYNRD_GEN_A_CORE
Chief Complaint   Patient presents with   • Implantable Defibrillator     [place] Fracture            Karl Villa is a 70 year old male who presents for history of cardiomyopathy. He has a past medical history of CAD s/p 4 vessel CABG in the setting of MI 2006, HTN, hyperlipidemia, s/p ICD for VT, carotid artery stenosis - chronic total occlusion of LICA, s/p right CEA, AAA, PAD s/p PTA/stent right iliac, DM, Parkinson's disease, CVA 3/2010, vascular dementia,    He is s/p BS single chamber ICD implanted 11/17/2010 by Dr Keller at [facility]  He is transferring care to [facility].    Pt does not use Latitude and does not have a unit at home.     He is accompanied today by his daughter who is helping with the collection of the HPI.    His ICD was at EOL and he underwent ICD generator change on 8/23/2022.          Past Medical History:        Essential (primary) hypertension                              CVA (cerebral vascular accident) (CMS/Formerly [facility])      03/01/2010    Diabetes mellitus (CMS/Formerly [facility])                                   Congestive cardiac failure (CMS/Formerly [facility])                          Coronary artery disease                                       Hyperlipoproteinemia                                          Carotid artery stenosis                                       Cardiomyopathy (CMS/Formerly [facility])                                      Parkinson's disease (CMS/Formerly [facility])              ROS    Physical Exam       Creatinine (mg/dL)   Date Value   08/18/2022 0.86       Ejection Fraction   Date Value Ref Range Status   03/22/2022 29 % Final     Moderately enlarged left ventricular chamber size with regional wall motion abnormalities (see diagram). LVEF = 29%.  Grade II diastolic dysfunction of the left ventricle (pseudonormal filling pattern).  Normal right ventricular size and systolic function. Normal RVSP = 27 mm Hg.  Trileaflet aortic valve. Non coronary cusp leaflet is calcified with poor excursion. Aortic valve sclerosis without stenosis.  No aortic valve regurgitation.  Agitated saline was injected through a peripheral vein and did not show evidence of a shunt.    Severely enlarged left atrial chamber size. Left atrial volume index of 55.9 ml/m². Left atrial volume index of 55.9 ml/m².      Assessment & Plan     On 8/30/2022, IGiuliana RN scribed the services personally performed by Dr Ashley  The documentation recorded by the scribe accurately and completely reflects the service(s) I personally performed and the decisions made by me.          No. JOSE screening performed.  STOP BANG Legend: 0-2 = LOW Risk; 3-4 = INTERMEDIATE Risk; 5-8 = HIGH Risk

## 2022-09-06 ENCOUNTER — APPOINTMENT (OUTPATIENT)
Dept: OBGYN | Facility: CLINIC | Age: 37
End: 2022-09-06

## 2022-09-06 VITALS
SYSTOLIC BLOOD PRESSURE: 113 MMHG | DIASTOLIC BLOOD PRESSURE: 67 MMHG | HEIGHT: 64 IN | BODY MASS INDEX: 29.88 KG/M2 | HEART RATE: 85 BPM | WEIGHT: 175 LBS

## 2022-09-06 PROCEDURE — 99395 PREV VISIT EST AGE 18-39: CPT

## 2022-09-06 NOTE — PHYSICAL EXAM
[Chaperone Present] : A chaperone was present in the examining room during all aspects of the physical examination [Appropriately responsive] : appropriately responsive [Alert] : alert [No Acute Distress] : no acute distress [Soft] : soft [Non-tender] : non-tender [Non-distended] : non-distended [No HSM] : No HSM [No Lesions] : no lesions [No Mass] : no mass [Oriented x3] : oriented x3 [Examination Of The Breasts] : a normal appearance [No Masses] : no breast masses were palpable [Labia Majora] : normal [Labia Minora] : normal [Normal] : normal [Uterine Adnexae] : normal [Absent] : absent

## 2022-09-06 NOTE — HISTORY OF PRESENT ILLNESS
[FreeTextEntry1] : 37 year old  F pt presents for routine gyn exam. Today pt is doing well. Denies any vaginal bleeding, no breast issues.\par \par GYNHx: endometriosis, female infertility\par OBHx: cesearn hysterectomy (SCHBS)\par PMHx: asthma, dysuria, GERD, hemorrhoid, hypothyroid, anxiety

## 2022-09-06 NOTE — END OF VISIT
[FreeTextEntry3] : I, Daniella Njboris acted as a scribe on behalf of Dr. Yousif on 09/06/2022 \par \par All medical entries made by the scribe were at my, Dr. Yousif, direction and personally dictated by me on 09/06/2022. I have reviewed the chart and agree that the record accurately reflects my personal performance of the history, physical exam, assessment and plan. I have also personally directed, reviewed, and agreed with the chart.\par

## 2022-09-06 NOTE — PLAN
[FreeTextEntry1] : 37 year old F pt presents for routine gyn exam. \par \par 1. HCM\par -Pap/HPV test collected and sent at today's visit\par -Discussed and reviewed importance of monthly BSE\par -RTO annually unless acute issue\par Lindsey Yousif MD \par \par \par

## 2022-09-07 LAB — HPV HIGH+LOW RISK DNA PNL CVX: NOT DETECTED

## 2022-09-11 LAB — CYTOLOGY CVX/VAG DOC THIN PREP: NORMAL

## 2022-09-23 ENCOUNTER — APPOINTMENT (OUTPATIENT)
Dept: PULMONOLOGY | Facility: CLINIC | Age: 37
End: 2022-09-23

## 2022-09-24 NOTE — DISCHARGE NOTE OB - IF YOU ARE A SMOKER, IT IS IMPORTANT FOR YOUR HEALTH TO STOP SMOKING. PLEASE BE AWARE THAT SECOND HAND SMOKE IS ALSO HARMFUL.
[facility] - Medical Surgical Unit  Hospital Medicine  History & Physical    Patient Name: Ajay Bass  MRN: [id number]  Patient Class: IP- Inpatient  Admission Date: 9/23/2022  Attending Physician: Esau Rosario DO  Primary Care Provider: BEA Szymanski         Patient information was obtained from patients daughter and medical records    Subjective:     Principal Problem:Pneumonia due to infectious organism    Chief Complaint:   Chief Complaint   Patient presents with    Loss of Consciousness    Vomiting        HPI: Ajay Bass is a 89 yo AAF that  presented to [facility] POV with c/o weakness, possible LOC, and fever on 9-23-22.  Pt s daughter states that when she was giving her mother a bath, she had an episode of LOC.  Daughter also reports that her mother had vomited each time she tried to take sips of water throughout the day.  Mrs. Bass presented to the ED with an oral temp of 102.5 F.  Daughter states she has not received any antipyretic medication.  She also reports her mother is blind and [place].  Seh reports her mother can normally ambulate using her walker with assistance for short distances without distress.    Her CXR revealed an area in RLL that was concerning for pneumonia.  Lab work revealed abnormalities of Cr 1.92 up from 1.46 on 9-1-22, glucose of 216 mg/dl (normally in 120-130's range per records), and Covid +.  CT head without was unremarkable.     Patients daughter states Mrs. Bass has a history of DM, but she has been taken off of all her medications due to her blood sugar dropping too low.  She also reports her mother has a history of HTN which she takes Zestoretic 20/12.5 1 po daily and Cardizem 300mg 1 po daily.  For her history of GERD, she takes Protonix 40mg 1 po daily.      Mrs. Bass will be admitted for treatment of pneumonia and dehydration.  Also she will be monitored for worsening of Covid.    PMH:  HTN, GERD, DM, blindness, hard of  hearing    Full Code    Dr. Pepper Tracy was consulted regarding admit.  Originally, Dr. Tracy ordered Remdesivir for treatment of Covid.  Michele Moyer, Pharmacist at [facility] contacted her regarding the use of Remdesivir and pts renal function.  It was decided Remdesivir would be held and kidney functions monitored for potential use.      Past Medical History:   Diagnosis Date    Alzheimer's disease, unspecified (CODE)     Blind     Diabetes mellitus, type 2     GERD (gastroesophageal reflux disease)     Teller (hard of hearing)     Hypertension     Major depressive disorder, single episode, unspecified        Past Surgical History:   Procedure Laterality Date    TOTAL HIP ARTHROPLASTY         Review of patient's allergies indicates:  No Known Allergies    No current facility-administered medications on file prior to encounter.     Current Outpatient Medications on File Prior to Encounter   Medication Sig    diltiaZEM (CARDIZEM CD) 300 MG 24 hr capsule Take 1 capsule (300 mg total) by mouth once daily.    lisinopriL-hydrochlorothiazide (PRINZIDE,ZESTORETIC) 20-12.5 mg per tablet Take 1 tablet by mouth once daily.    multivit-min-iron-FA-lutein (CENTRUM SILVER WOMEN) 8 mg iron-400 mcg-300 mcg Tab Take 1 tablet by mouth once daily.    pantoprazole (PROTONIX) 40 MG tablet Take 1 tablet (40 mg total) by mouth once daily.     Family History    None       Tobacco Use    Smoking status: Never    Smokeless tobacco: Never   Substance and Sexual Activity    Alcohol use: Never    Drug use: Never    Sexual activity: Not Currently     Review of Systems   Constitutional:  Positive for activity change, appetite change, diaphoresis, fatigue and fever. Negative for chills.   HENT:  Positive for hearing loss (chronic). Negative for congestion, drooling, mouth sores, rhinorrhea, sneezing, sore throat and trouble swallowing.    Eyes: Negative.    Respiratory:  Positive for cough.    Cardiovascular:  Negative for chest  pain.   Gastrointestinal:  Positive for vomiting.   Endocrine: Negative.    Genitourinary: Negative.    Musculoskeletal: Negative.    Skin: Negative.    Neurological:  Positive for weakness. Negative for headaches.   Psychiatric/Behavioral: Negative.     Objective:     Vital Signs (Most Recent):  Temp: 98.2 °F (36.8 °C) (09/24/22 0046)  Pulse: 71 (09/24/22 0046)  Resp: 16 (09/24/22 0046)  BP: 120/60 (09/24/22 0046)  SpO2: 96 % (09/24/22 0046) Vital Signs (24h Range):  Temp:  [98.2 °F (36.8 °C)-102.5 °F (39.2 °C)] 98.2 °F (36.8 °C)  Pulse:  [71-83] 71  Resp:  [16-18] 16  SpO2:  [96 %-98 %] 96 %  BP: (120-134)/(52-60) 120/60     Weight: 77.1 kg (170 lb)  Body mass index is 31.09 kg/m².    Physical Exam  Constitutional:       General: She is not in acute distress.     Appearance: She is well-developed. She is not ill-appearing, toxic-appearing or diaphoretic.   HENT:      Head: Normocephalic and atraumatic.      Right Ear: Tympanic membrane and ear canal normal. There is no impacted cerumen.      Left Ear: Tympanic membrane and ear canal normal. There is no impacted cerumen.      Nose: No congestion or rhinorrhea.      Mouth/Throat:      Mouth: Mucous membranes are moist.   Cardiovascular:      Rate and Rhythm: Normal rate and regular rhythm.      Pulses: Normal pulses.      Heart sounds: Normal heart sounds.   Pulmonary:      Effort: Pulmonary effort is normal. No respiratory distress.      Breath sounds: Normal breath sounds. No wheezing.   Abdominal:      General: Bowel sounds are normal.      Palpations: Abdomen is soft.   Musculoskeletal:         General: No swelling.      Right lower leg: No edema.      Left lower leg: No edema.   Lymphadenopathy:      Cervical: No cervical adenopathy.   Skin:     General: Skin is warm and dry.      Capillary Refill: Capillary refill takes less than 2 seconds.   Neurological:      Mental Status: She is lethargic.      Motor: Weakness present.           Significant Labs: All  Statement Selected pertinent labs within the past 24 hours have been reviewed.    Significant Imaging: I have reviewed all pertinent imaging results/findings within the past 24 hours.    Assessment/Plan:     * Pneumonia due to infectious organism  -Rocephin 1 GM IV daily  -Azithromycin 500 mg IV daily  -Monitor CBC daily starting on 9-25-22 due to admit date and time  -Monitor CXR daily starting on 9-25-22 due to admit date and time      COVID  Patient is identified as Covid positive within 48 hours of testing positive   Initiate standard COVID protocols; COVID-19 testing ,Infection Control notification  and isolation- respiratory, contact and droplet per protocol    Diagnostics: (leukopenia, hyponatremia, hyperferritinemia, elevated troponin, elevated d-dimer, age, and comorbidities are significant predictors of poor clinical outcome)  CBC, CMP, CXR 1 view    Management: Monitor O2 sat, daily CXR 1 view    Dehydration  -NS @ 100 ml/hr  -Monitor daily CMP  -Discontinue Zestoretic 20/12.5 1 po daily due to nephrotoxicity until kidney function improves      Weakness  -Fall precautions      VTE Risk Mitigation (From admission, onward)         Ordered     IP VTE HIGH RISK PATIENT  Once         09/23/22 2347     Place sequential compression device  Until discontinued         09/23/22 2347              I have discussed the above patients case, lab results, radiology results, and medication reconciliation with Dr. Pepper Tracy, Hospitalist at [facility], and she agrees with the above plan of care.    Bhavna Lubin, NP-C  Department of Hospital Medicine   [facility] - Medical Surgical Unit

## 2022-11-08 ENCOUNTER — APPOINTMENT (OUTPATIENT)
Dept: PAIN MANAGEMENT | Facility: CLINIC | Age: 37
End: 2022-11-08

## 2022-11-08 VITALS
BODY MASS INDEX: 28.17 KG/M2 | DIASTOLIC BLOOD PRESSURE: 75 MMHG | SYSTOLIC BLOOD PRESSURE: 110 MMHG | WEIGHT: 165 LBS | HEIGHT: 64 IN | HEART RATE: 96 BPM

## 2022-11-08 PROCEDURE — 99214 OFFICE O/P EST MOD 30 MIN: CPT

## 2022-11-08 NOTE — HISTORY OF PRESENT ILLNESS
[FreeTextEntry1] : 36 y/o F with Pmhx Migraines who presents today for follow up after not being seen for 2 years.  She reports IVF and complicated  7 months ago.  Since then her headaches have progressively worsened and have been constant for the last month.  HA vary in intensity frontal as well as either temple L>R 7-8/10 on average associated with sensitivity to light and noise nausea w/o vomiting, dizziness.  She has been taking Rizatriptan 3-4 x/week without help as well as Excedrin Migraine daily.  \par \par Prior meds: Topamax, Amitriptyline, Sumatriptan, Butalbital, Ribo, Magnesium, Butterbur \par Current meds include: Rizatriptan , Phentermine(5-6 months for weight loss), Ozempic \par \par Social : She is  and has 2 children 5 year old and 7 month. She is working as a  in [place], lives in [place].

## 2022-11-08 NOTE — ASSESSMENT
[FreeTextEntry1] : 38 y/o F with chronic Migraines who discontinued treatment for IVF and pregnancy . Now with significant worsening of her Migraines headaches, now with constant daily headaches and Migraine. \par \par She was instructed to discontinue the regular use of OTC NSAIDS which may be contributing to her headaches. \par -Medrol dose pack to abort Migraines \par -Restart botox treatment as preventative.  Of note she has failed multiple prophylactic medications in the past and previouslyresponded well to Botox treatment  (discontinued treatment for IVF and pregnancy)\par -Failed Sumatriptan and Rizatriptan \par -Trial of Nurtec 75 mg prn \par -Discussed phentermine which may be contributing to HA as well. \par \par

## 2022-11-08 NOTE — PHYSICAL EXAM
[FreeTextEntry1] : General appearance: Well nourished and with attention to grooming.No signs of distress. No signs of toxicity.\par Neck/spine: Examination of the cervical spine reveals normal range of motion in the neck, no midline tenderness, right paraspinal muscle tenderness, right trap tenderness\par Skin: No rash.\par Musculoskeletal: No joint deformities, no scoliosis, lordosis, kyphosis, pes cavus or hammer toes.\par Extremities: No peripheral edema.\par Neurological exam:\par Mental status: Patient is alert, attentive and oriented X3. Speech is coherent and fluent without dysarthria or aphasia. Affect normal.\par CN: Pupils were 6 mm and reactive to light. Extraocular movements were full. Visual fields are intact to confrontation. No nystagmus. There was no face, jaw, palate or tongue weakness or atrophy. Facial sensation was normal and symmetric. Hearing was grossly intact. Shoulder shrug was normal.\par Motor exam revealed normal bulk and tone. There is no evidence of atrophy or fasciculations. There is no pronator drift. Manual muscle testing revealed 5/5 strength throughout including neck flexion/extension and proximal and distal muscles of the arms and legs.\par Sensory exam was normal to PP/LT\par DTRs: 2+ b/l biceps, 2+ triceps, 2 + brachioradialis, 2+ patellar, and 2+ ankle reflexes. Plantar responses were flexor bilaterally. No clonus, Hallman's or crossed adductor response.\par Gait: Normal gait.

## 2022-11-29 RX ORDER — RIMEGEPANT SULFATE 75 MG/75MG
75 TABLET, ORALLY DISINTEGRATING ORAL
Qty: 8 | Refills: 5 | Status: ACTIVE | COMMUNITY
Start: 2022-11-08 | End: 1900-01-01

## 2022-12-08 NOTE — PHYSICAL THERAPY INITIAL EVALUATION ADULT - STRENGTHENING, PT EVAL
Patient states she was told at her last appointment that she is not a candidate for bariatric surgery. She completed a sleep study and it came back negative but she now has diabetes, high blood pressure and thyroid issues.  She is wondering if she is able to restart in the program. GOAL: Improve strength t/o berny Le's by 1/2 grade in 2 weeks

## 2022-12-29 NOTE — ASU PREOP CHECKLIST - SITE MARKED BY SURGEON
Patient returned call to clinic regarding message from Carie Sanchez PA-C sent at 12/28/2022  2:16 PM CST ----- ALT slightly higher again. Per notes, patient has been tapering prednisone. Please confirm if patient still on prednisone or if stopped recently. Likely will continue with monthly labs with no changes.   Patient reports she has been off prednisone now for approximately 2 weeks and started gabapentin as well.  Patient states she is feeling like she did prior to her visit to [facility] when she believed she was having additional autoimmune symptoms.  Patient will follow up as planned and repeat labs monthly.  No other questions at this time.    
n/a

## 2023-01-09 ENCOUNTER — APPOINTMENT (OUTPATIENT)
Dept: PAIN MANAGEMENT | Facility: CLINIC | Age: 38
End: 2023-01-09
Payer: COMMERCIAL

## 2023-01-09 VITALS
DIASTOLIC BLOOD PRESSURE: 62 MMHG | WEIGHT: 160 LBS | HEIGHT: 64 IN | BODY MASS INDEX: 27.31 KG/M2 | HEART RATE: 76 BPM | SYSTOLIC BLOOD PRESSURE: 99 MMHG

## 2023-01-09 PROCEDURE — 99213 OFFICE O/P EST LOW 20 MIN: CPT | Mod: 25

## 2023-01-09 PROCEDURE — 64615 CHEMODENERV MUSC MIGRAINE: CPT

## 2023-01-09 NOTE — PROCEDURE
[Chronic migraine] : Chronic migraine [Consent Signed] : consent signed [Adverse Effects] : no adverse effects [BOTOX 200 Units] : BOTOX 200 units; 5 units per muscle site.  procerus x 1, corragator x 2, frontalis x 4, temporalis x 8, occipitalis x 6, cervical  paraspinal x 4 and trapezius x 6 [FreeTextEntry1] : 45 units wasted

## 2023-01-09 NOTE — ASSESSMENT
[FreeTextEntry1] : 36 y/o F with chronic Migraines who discontinued treatment for IVF and pregnancy . Now with significant worsening of her Migraines headaches, now with constant daily headaches and Migraine \par \par - botox treatment restarted today and tolerated well. \par -Failed Sumatriptan and Rizatriptan \par -Continue Nurtec 75 mg prn as abortive\par -Discussed phentermine which may be contributing to HA as well. \par -fu in 3 months \par \par

## 2023-01-09 NOTE — HISTORY OF PRESENT ILLNESS
[FreeTextEntry1] : 36 y/o F with Pmhx chronic Migraines who presents today for follow up for progressively worsening Migraines and to restart Botox treatment which was d/cd secondary to IVF and pregnancy. Since her last visit reports no new medical issues or changes to her headaches. She is having headaches daily, constant  and varies in intensity in either temple L>R 7-8/10 on average associated with sensitivity to light and noise nausea w/o vomiting, dizziness.  She tried Nurtec which helps abort HA. \par \par Prior meds: Topamax, Amitriptyline, Sumatriptan, Butalbital, Ribo, Magnesium, Butterbur \par Current meds include: Rizatriptan , Phentermine(5-6 months for weight loss), Ozempic, Nurtec \par \par Social : She is  and has 2 children 5 year old and 7 month. She is working as a  in [place], lives in [place].

## 2023-03-16 RX ORDER — METHYLPREDNISOLONE 4 MG/1
4 TABLET ORAL
Qty: 1 | Refills: 0 | Status: ACTIVE | COMMUNITY
Start: 2022-11-08 | End: 1900-01-01

## 2023-03-16 RX ORDER — OXYCODONE 5 MG/1
5 TABLET ORAL
Qty: 10 | Refills: 0 | Status: DISCONTINUED | COMMUNITY
Start: 2022-03-28 | End: 2023-03-16

## 2023-03-20 RX ORDER — UBROGEPANT 100 MG/1
100 TABLET ORAL DAILY
Qty: 1 | Refills: 5 | Status: ACTIVE | COMMUNITY
Start: 2023-03-16 | End: 1900-01-01

## 2023-04-07 ENCOUNTER — APPOINTMENT (OUTPATIENT)
Dept: PAIN MANAGEMENT | Facility: CLINIC | Age: 38
End: 2023-04-07
Payer: COMMERCIAL

## 2023-04-07 PROCEDURE — 64615 CHEMODENERV MUSC MIGRAINE: CPT

## 2023-04-07 PROCEDURE — 99214 OFFICE O/P EST MOD 30 MIN: CPT | Mod: 25

## 2023-04-07 RX ORDER — NABUMETONE 750 MG/1
750 TABLET, FILM COATED ORAL
Qty: 20 | Refills: 1 | Status: ACTIVE | COMMUNITY
Start: 2023-04-07 | End: 1900-01-01

## 2023-04-07 NOTE — ASSESSMENT
[FreeTextEntry1] : 36 y/o F with chronic Migraines who discontinued treatment for IVF and pregnancy . Now with significant worsening of her Migraines headaches, now with constant daily headaches and Migraine \par \par - botox treatment restarted today and tolerated well. \par -Failed Sumatriptan and Rizatriptan , Nurtec \par -Continue Ubrelvy prn as abortive\par \par -Nabumetone 750 mg bid with meals for 7-10 days \par -fu in 3 months \par \par

## 2023-04-07 NOTE — PROCEDURE
[Chronic migraine] : Chronic migraine [Consent Signed] : consent signed [BOTOX 200 Units] : BOTOX 200 units; 5 units per muscle site.  procerus x 1, corragator x 2, frontalis x 4, temporalis x 8, occipitalis x 6, cervical  paraspinal x 4 and trapezius x 6 [Adverse Effects] : no adverse effects [FreeTextEntry1] : 45 units wasted

## 2023-04-07 NOTE — HISTORY OF PRESENT ILLNESS
[FreeTextEntry1] : 36 y/o F with Pmhx chronic Migraines who presents today for follow up for progressively worsening Migraines.  Since her last visit reported Covid infection early Feb 2023 as well as ear infection. She saw ENT and started on oral antihistamines. Progressively worsening headaches over the 2 months. She is now having headaches daily 6-7/10 with more severe headaches 10/10 3x/week. HA sharp pain behind either eye L>R progressively worsening as the day goes on but last the entire day, + photophobia, phonophobia, vertigo, nausea, no vomiting. \par \par Ubrelvy prn helps better.  She tried Nurtec which helps abort HA. \par \par Prior meds: Topamax, Amitriptyline, Sumatriptan, Butalbital, Ribo, Magnesium, Butterbur Nurtec , Phentermine(5-6 months for weight loss \par Current meds include: Rizatriptan , Ozempic, Ubrelvy \par \par Social : She is  and has 2 children 5 year old and 1 year She is working as a  in [place], lives in [place].

## 2023-05-02 ENCOUNTER — TRANSCRIPTION ENCOUNTER (OUTPATIENT)
Age: 38
End: 2023-05-02

## 2023-06-13 ENCOUNTER — APPOINTMENT (OUTPATIENT)
Dept: OBGYN | Facility: CLINIC | Age: 38
End: 2023-06-13
Payer: COMMERCIAL

## 2023-06-13 ENCOUNTER — NON-APPOINTMENT (OUTPATIENT)
Age: 38
End: 2023-06-13

## 2023-06-13 VITALS
WEIGHT: 155 LBS | BODY MASS INDEX: 26.46 KG/M2 | DIASTOLIC BLOOD PRESSURE: 76 MMHG | SYSTOLIC BLOOD PRESSURE: 111 MMHG | HEIGHT: 64 IN

## 2023-06-13 PROCEDURE — 99213 OFFICE O/P EST LOW 20 MIN: CPT

## 2023-06-13 NOTE — HISTORY OF PRESENT ILLNESS
[FreeTextEntry1] : 39 yo female pt present for a follow up to discuss findings on her CT Scan. She reports having a lot of stomach issues where she experienced vomiting nausea and diarrhea. She also states dealing with cramps as if she was having a heavy period every two weeks. The onset of all of these symptoms was 2 months ago. She went to the hospital for being dehydrated due to the GI issues, where they did a CT Scan and fluid around her ovaries was found. \par \par She also reports that sex has been painful lately but she feels that everything is irritated due to her GI issues. She says the pain was really bad after her hysterectomy, but it had gone away. The pain had come back recently. \par \par She denies fevers. \par \par CT scan (seen on portal): enteritis seen, 2.3cm right ovarian cyst seen, no FF

## 2023-06-13 NOTE — PLAN
[FreeTextEntry1] : 37 yo female pt present to discuss her CT scans\par \par -rx for pelvic sono \par -f/u after sono\par -pt advised to continue seeing GI doctor\par Lindsey Yousif MD

## 2023-06-19 ENCOUNTER — APPOINTMENT (OUTPATIENT)
Dept: ULTRASOUND IMAGING | Facility: CLINIC | Age: 38
End: 2023-06-19
Payer: COMMERCIAL

## 2023-06-19 PROCEDURE — 76856 US EXAM PELVIC COMPLETE: CPT | Mod: 59

## 2023-06-19 PROCEDURE — 76830 TRANSVAGINAL US NON-OB: CPT

## 2023-06-20 ENCOUNTER — TRANSCRIPTION ENCOUNTER (OUTPATIENT)
Age: 38
End: 2023-06-20

## 2023-07-10 ENCOUNTER — APPOINTMENT (OUTPATIENT)
Dept: PAIN MANAGEMENT | Facility: CLINIC | Age: 38
End: 2023-07-10
Payer: COMMERCIAL

## 2023-07-10 VITALS
SYSTOLIC BLOOD PRESSURE: 110 MMHG | BODY MASS INDEX: 26.46 KG/M2 | HEART RATE: 73 BPM | HEIGHT: 64 IN | DIASTOLIC BLOOD PRESSURE: 72 MMHG | WEIGHT: 155 LBS

## 2023-07-10 PROCEDURE — 99214 OFFICE O/P EST MOD 30 MIN: CPT | Mod: 25

## 2023-07-10 PROCEDURE — 64615 CHEMODENERV MUSC MIGRAINE: CPT

## 2023-07-10 NOTE — HISTORY OF PRESENT ILLNESS
[FreeTextEntry1] : 37 y/o F with Pmhx chronic Migraines who presents today for follow up for progressively worsening Migraines.  Since [facility] last visit reports feeling better following Covid infection early Feb 2023 . HA occurring 1-2x/week on average  6-7/10 with more severe headaches 10/10 3x/week. HA sharp pain behind either eye L>R progressively worsening as the day goes on but last the entire day, + photophobia, phonophobia, vertigo, nausea, no vomiting. \par \par Ubrelvy prn helps better.  She tried Nurtec which helps abort HA. \par \par Prior meds: Topamax, Amitriptyline, Sumatriptan, Butalbital, Ribo, Magnesium, Butterbur Nurtec , Phentermine(5-6 months for weight loss \par Current meds include: Botox, Rizatriptan , Ozempic, Ubrelvy, Levothyroxine, Pantoprazole, Zoloft \par \par Social : She is  and has 2 children 5 year old and 1 year She is working as a  in [place], lives in [place].

## 2023-07-10 NOTE — ASSESSMENT
[FreeTextEntry1] : 39 y/o F with chronic Migraines who discontinued treatment for IVF and pregnancy . Now with significant worsening of her Migraines headaches, now with constant daily headaches and Migraine \par \par - botox treatment restarted today and tolerated well. \par -Failed Sumatriptan and Rizatriptan , Nurtec \par -Continue Ubrelvy prn as abortive\par -fu in 3 months \par \par

## 2023-09-05 ENCOUNTER — TRANSCRIPTION ENCOUNTER (OUTPATIENT)
Age: 38
End: 2023-09-05

## 2023-09-05 DIAGNOSIS — N64.4 MASTODYNIA: ICD-10-CM

## 2023-09-14 NOTE — OB RN PATIENT PROFILE - RUBELLA: DATE, OB PROFILE
04-Oct-2021 [facility] Pregnancy And Lactation Text: The risk during pregnancy and breastfeeding is uncertain with this medication.

## 2023-09-15 ENCOUNTER — OUTPATIENT (OUTPATIENT)
Dept: OUTPATIENT SERVICES | Facility: HOSPITAL | Age: 38
LOS: 1 days | End: 2023-09-15
Payer: COMMERCIAL

## 2023-09-15 ENCOUNTER — RESULT REVIEW (OUTPATIENT)
Age: 38
End: 2023-09-15

## 2023-09-15 ENCOUNTER — APPOINTMENT (OUTPATIENT)
Dept: ULTRASOUND IMAGING | Facility: IMAGING CENTER | Age: 38
End: 2023-09-15
Payer: COMMERCIAL

## 2023-09-15 ENCOUNTER — APPOINTMENT (OUTPATIENT)
Dept: MAMMOGRAPHY | Facility: IMAGING CENTER | Age: 38
End: 2023-09-15
Payer: COMMERCIAL

## 2023-09-15 DIAGNOSIS — Z98.890 OTHER SPECIFIED POSTPROCEDURAL STATES: Chronic | ICD-10-CM

## 2023-09-15 DIAGNOSIS — Z90.89 ACQUIRED ABSENCE OF OTHER ORGANS: Chronic | ICD-10-CM

## 2023-09-15 DIAGNOSIS — Z98.89 OTHER SPECIFIED POSTPROCEDURAL STATES: Chronic | ICD-10-CM

## 2023-09-15 DIAGNOSIS — Z52.811 EGG (OOCYTE) DONOR UNDER AGE 35, DESIGNATED RECIPIENT: Chronic | ICD-10-CM

## 2023-09-15 DIAGNOSIS — N64.4 MASTODYNIA: ICD-10-CM

## 2023-09-15 DIAGNOSIS — Z98.891 HISTORY OF UTERINE SCAR FROM PREVIOUS SURGERY: Chronic | ICD-10-CM

## 2023-09-15 DIAGNOSIS — Z00.8 ENCOUNTER FOR OTHER GENERAL EXAMINATION: ICD-10-CM

## 2023-09-15 PROCEDURE — 76641 ULTRASOUND BREAST COMPLETE: CPT

## 2023-09-15 PROCEDURE — 76641 ULTRASOUND BREAST COMPLETE: CPT | Mod: 26,50

## 2023-09-15 PROCEDURE — 77066 DX MAMMO INCL CAD BI: CPT | Mod: 26

## 2023-09-15 PROCEDURE — 77066 DX MAMMO INCL CAD BI: CPT

## 2023-09-15 PROCEDURE — G0279: CPT | Mod: 26

## 2023-09-15 PROCEDURE — G0279: CPT

## 2023-09-22 ENCOUNTER — RESULT REVIEW (OUTPATIENT)
Age: 38
End: 2023-09-22

## 2023-09-22 ENCOUNTER — APPOINTMENT (OUTPATIENT)
Dept: MAMMOGRAPHY | Facility: IMAGING CENTER | Age: 38
End: 2023-09-22
Payer: COMMERCIAL

## 2023-09-22 ENCOUNTER — APPOINTMENT (OUTPATIENT)
Dept: ULTRASOUND IMAGING | Facility: IMAGING CENTER | Age: 38
End: 2023-09-22
Payer: COMMERCIAL

## 2023-09-22 ENCOUNTER — OUTPATIENT (OUTPATIENT)
Dept: OUTPATIENT SERVICES | Facility: HOSPITAL | Age: 38
LOS: 1 days | End: 2023-09-22
Payer: COMMERCIAL

## 2023-09-22 DIAGNOSIS — Z00.8 ENCOUNTER FOR OTHER GENERAL EXAMINATION: ICD-10-CM

## 2023-09-22 DIAGNOSIS — Z52.811 EGG (OOCYTE) DONOR UNDER AGE 35, DESIGNATED RECIPIENT: Chronic | ICD-10-CM

## 2023-09-22 DIAGNOSIS — Z98.891 HISTORY OF UTERINE SCAR FROM PREVIOUS SURGERY: Chronic | ICD-10-CM

## 2023-09-22 DIAGNOSIS — Z98.89 OTHER SPECIFIED POSTPROCEDURAL STATES: Chronic | ICD-10-CM

## 2023-09-22 DIAGNOSIS — Z90.89 ACQUIRED ABSENCE OF OTHER ORGANS: Chronic | ICD-10-CM

## 2023-09-22 DIAGNOSIS — Z98.890 OTHER SPECIFIED POSTPROCEDURAL STATES: Chronic | ICD-10-CM

## 2023-09-22 PROCEDURE — 77065 DX MAMMO INCL CAD UNI: CPT | Mod: 26,LT

## 2023-09-22 PROCEDURE — G0279: CPT | Mod: 26

## 2023-09-22 PROCEDURE — 76642 ULTRASOUND BREAST LIMITED: CPT | Mod: 26,LT

## 2023-09-22 PROCEDURE — G0279: CPT

## 2023-09-22 PROCEDURE — 77065 DX MAMMO INCL CAD UNI: CPT

## 2023-09-22 PROCEDURE — 76642 ULTRASOUND BREAST LIMITED: CPT

## 2023-10-11 ENCOUNTER — NON-APPOINTMENT (OUTPATIENT)
Age: 38
End: 2023-10-11

## 2023-10-11 ENCOUNTER — APPOINTMENT (OUTPATIENT)
Dept: PAIN MANAGEMENT | Facility: CLINIC | Age: 38
End: 2023-10-11
Payer: COMMERCIAL

## 2023-10-11 VITALS
WEIGHT: 155 LBS | BODY MASS INDEX: 26.46 KG/M2 | HEIGHT: 64 IN | DIASTOLIC BLOOD PRESSURE: 66 MMHG | HEART RATE: 81 BPM | SYSTOLIC BLOOD PRESSURE: 93 MMHG

## 2023-10-11 DIAGNOSIS — M54.2 CERVICALGIA: ICD-10-CM

## 2023-10-11 PROCEDURE — 99214 OFFICE O/P EST MOD 30 MIN: CPT | Mod: 25

## 2023-10-11 PROCEDURE — 64615 CHEMODENERV MUSC MIGRAINE: CPT

## 2023-10-11 RX ORDER — DICLOFENAC SODIUM 75 MG/1
75 TABLET, DELAYED RELEASE ORAL
Qty: 30 | Refills: 1 | Status: ACTIVE | COMMUNITY
Start: 2023-10-11 | End: 1900-01-01

## 2023-12-05 ENCOUNTER — APPOINTMENT (OUTPATIENT)
Dept: PAIN MANAGEMENT | Facility: CLINIC | Age: 38
End: 2023-12-05
Payer: COMMERCIAL

## 2023-12-05 PROCEDURE — 20553 NJX 1/MLT TRIGGER POINTS 3/>: CPT

## 2023-12-05 PROCEDURE — 99214 OFFICE O/P EST MOD 30 MIN: CPT | Mod: 25

## 2023-12-05 PROCEDURE — 64405 NJX AA&/STRD GR OCPL NRV: CPT | Mod: 50,59

## 2024-01-08 RX ORDER — TIZANIDINE HYDROCHLORIDE 2 MG/1
2 CAPSULE ORAL
Qty: 30 | Refills: 0 | Status: ACTIVE | COMMUNITY
Start: 2023-10-11 | End: 1900-01-01

## 2024-01-12 ENCOUNTER — NON-APPOINTMENT (OUTPATIENT)
Age: 39
End: 2024-01-12

## 2024-01-12 ENCOUNTER — APPOINTMENT (OUTPATIENT)
Dept: PAIN MANAGEMENT | Facility: CLINIC | Age: 39
End: 2024-01-12

## 2024-01-16 NOTE — ASSESSMENT
[FreeTextEntry1] : 37 y/o F with chronic Migraines who discontinued treatment for IVF and pregnancy and restarted Botox with significant reduction in Migraines days but still with daily headaches. On exam B/L trap and C paraspinal muscle tenderness and spasm, + pain over greater occipital notch with reproducible pain radiating forwards to temple and behind eye  -B/L ONB and B/L TPI performed today without AE.  -Failed Sumatriptan and Rizatriptan , Nurtec -Continue Ubrelvy prn as abortive - tizanidine 2 mg q hs prn    BERNARD CHRISTIANSONI was monitored in the office for 10 minutes after injections and tolerated procedure well without adverse events.

## 2024-01-16 NOTE — PHYSICAL EXAM
[FreeTextEntry1] : General appearance: Well nourished and with attention to grooming.No signs of distress. No signs of toxicity. Head/Neck/spine: + pain over greater occipital notch with reproducible pain radiating forwards to temple and behind eye. Examination of the cervical spine reveals normal range of motion in the neck, no midline tenderness, right paraspinal muscle tenderness, right trap tenderness Skin: No rash. Musculoskeletal: No joint deformities, no scoliosis, lordosis, kyphosis, pes cavus or hammer toes. Extremities: No peripheral edema. Neurological exam: Mental status: Patient is alert, attentive and oriented X3. Speech is coherent and fluent without dysarthria or aphasia. Affect normal. CN: Pupils were 6 mm and reactive to light. Extraocular movements were full. Visual fields are intact to confrontation. No nystagmus. There was no face, jaw, palate or tongue weakness or atrophy. Facial sensation was normal and symmetric. Hearing was grossly intact. Shoulder shrug was normal. Motor exam revealed normal bulk and tone. There is no evidence of atrophy or fasciculations. There is no pronator drift. Manual muscle testing revealed 5/5 strength throughout including neck flexion/extension and proximal and distal muscles of the arms and legs. Sensory exam was normal to PP/LT DTRs: 2+ b/l biceps, 2+ triceps, 2 + brachioradialis, 2+ patellar, and 2+ ankle reflexes. Plantar responses were flexor bilaterally. No clonus, Hallman's or crossed adductor response. Gait: Normal gait.

## 2024-01-16 NOTE — HISTORY OF PRESENT ILLNESS
[FreeTextEntry1] : 37 y/o F with Pmhx chronic Migraines who presents today for follow up for progressively worsening Migraines.  Since restarting Botox she has significant reduction in Migraines days now 1 migraine day per week associated with photophobia, phonophobia, vertigo, nausea, no vomiting. She is also having milder headaches almost daily, often starting in the back of neck that spreads.  Ubrelvy prn helps better.    Prior meds: Topamax, Amitriptyline, Sumatriptan, Butalbital, Ribo, Magnesium, Butterbur Nurtec , Phentermine(5-6 months for weight loss  Current meds include: Botox, Rizatriptan , Ozempic, Ubrelvy, Levothyroxine, Pantoprazole, Zoloft   Social : She is  and has 2 children 5 year old and 1 year She is working as a  in [place], lives in [place].

## 2024-01-17 NOTE — H&P PST ADULT - VENOUS THROMBOEMBOLISM HISTORY
(0) indicator not present Normal vision: sees adequately in most situations; can see medication labels, newsprint

## 2024-02-13 ENCOUNTER — APPOINTMENT (OUTPATIENT)
Dept: PAIN MANAGEMENT | Facility: CLINIC | Age: 39
End: 2024-02-13

## 2024-02-21 ENCOUNTER — APPOINTMENT (OUTPATIENT)
Dept: PAIN MANAGEMENT | Facility: CLINIC | Age: 39
End: 2024-02-21
Payer: COMMERCIAL

## 2024-02-21 VITALS
HEIGHT: 64 IN | SYSTOLIC BLOOD PRESSURE: 101 MMHG | DIASTOLIC BLOOD PRESSURE: 63 MMHG | BODY MASS INDEX: 27.31 KG/M2 | HEART RATE: 90 BPM | WEIGHT: 160 LBS

## 2024-02-21 PROCEDURE — 64615 CHEMODENERV MUSC MIGRAINE: CPT

## 2024-02-21 PROCEDURE — 99213 OFFICE O/P EST LOW 20 MIN: CPT | Mod: 25

## 2024-02-21 NOTE — PROCEDURE
[Chronic migraine] : Chronic migraine [Consent Signed] : consent signed [Adverse Effects] : no adverse effects [Continue Current Treatment] : continue current treatment [BOTOX 200 Units] : BOTOX 200 units; 5 units per muscle site.  procerus x 1, corragator x 2, frontalis x 4, temporalis x 8, occipitalis x 6, cervical  paraspinal x 4 and trapezius x 6 [FreeTextEntry1] : 45 units wasted  Botox [id number], exp 06/2026

## 2024-02-21 NOTE — ASSESSMENT
[FreeTextEntry1] : 39 y/o F with chronic Migraines who discontinued treatment for IVF and pregnancy and restarted Botox with significant reduction in Migraines days but still with daily headaches. On exam B/L trap and C paraspinal muscle tenderness and spasm, + pain over greater occipital notch with reproducible pain radiating forwards to temple and behind eye  Botox tx performed today w/out AE  -Failed Sumatriptan and Rizatriptan , Nurtec -Continue Ubrelvy prn as abortive - tizanidine 2 mg q hs prn    BERNARD CHILDS was monitored in the office for 10 minutes after injections and tolerated procedure well without adverse events.

## 2024-02-21 NOTE — HISTORY OF PRESENT ILLNESS
[FreeTextEntry1] : 37 y/o F with Pmhx chronic Migraines who presents today for follow up for progressively worsening Migraines.  Since restarting Botox she has significant reduction in Migraines but with increased frequency of headaches the last few weeks after she broke her nose- young daughter accidentally bumped.   She typically has 1 migraine day per week associated with photophobia, phonophobia, vertigo, nausea, no vomiting. She is also having milder headaches almost daily, often starting in the back of neck that spreads.  Ubrelvy prn helps better.     Headache Days/Month: Prior to Botox tx:  24-30 Current: 6 Headache Hours/Day:   Prior to Botox tx: 24   Current : 1  Prior meds: Topamax, Amitriptyline, Sumatriptan, Butalbital, Ribo, Magnesium, Butterbur Nurtec , Phentermine(5-6 months for weight loss  Current meds include: Botox, Rizatriptan , Ozempic, Ubrelvy, Levothyroxine, Pantoprazole, Zoloft   Social : She is  and has 2 children 5 year old and 1 year She is working as a  in [place], lives in [place].

## 2024-03-15 ENCOUNTER — APPOINTMENT (OUTPATIENT)
Dept: OBGYN | Facility: CLINIC | Age: 39
End: 2024-03-15
Payer: COMMERCIAL

## 2024-03-15 ENCOUNTER — RESULT REVIEW (OUTPATIENT)
Age: 39
End: 2024-03-15

## 2024-03-15 VITALS
HEIGHT: 64 IN | BODY MASS INDEX: 26.12 KG/M2 | WEIGHT: 153 LBS | SYSTOLIC BLOOD PRESSURE: 99 MMHG | HEART RATE: 106 BPM | DIASTOLIC BLOOD PRESSURE: 67 MMHG

## 2024-03-15 DIAGNOSIS — R92.30 DENSE BREASTS, UNSPECIFIED: ICD-10-CM

## 2024-03-15 DIAGNOSIS — Z01.419 ENCOUNTER FOR GYNECOLOGICAL EXAMINATION (GENERAL) (ROUTINE) W/OUT ABNORMAL FINDINGS: ICD-10-CM

## 2024-03-15 DIAGNOSIS — N83.209 UNSPECIFIED OVARIAN CYST, UNSPECIFIED SIDE: ICD-10-CM

## 2024-03-15 PROCEDURE — 99213 OFFICE O/P EST LOW 20 MIN: CPT | Mod: 25

## 2024-03-15 PROCEDURE — 99395 PREV VISIT EST AGE 18-39: CPT

## 2024-03-15 NOTE — PHYSICAL EXAM
[Appropriately responsive] : appropriately responsive [Alert] : alert [No Acute Distress] : no acute distress [No Lymphadenopathy] : no lymphadenopathy [Soft] : soft [Non-tender] : non-tender [Non-distended] : non-distended [No HSM] : No HSM [No Lesions] : no lesions [No Mass] : no mass [Examination Of The Breasts] : a normal appearance [Oriented x3] : oriented x3 [No Masses] : no breast masses were palpable [Labia Majora] : normal [Labia Minora] : normal [Normal] : normal [Uterine Adnexae] : normal [Absent] : absent

## 2024-03-15 NOTE — PLAN
[FreeTextEntry1] : BERNARD CHILDS is a 38 year old presenting for annual GYN exam.  -Pap/HPV done today  -Rx for pelvic sono  -Rx for mammo/sono  -discussed ovarian cyst, observation, surgery all questions answered Lindsey Yousif MD

## 2024-03-15 NOTE — END OF VISIT
[FreeTextEntry3] : I, Adrianna Chau, acted solely as a scribe for Dr. Lindsey Yousif MD., on 03/15/2024.  All medical record entries made by the scribe were at my, Dr. Lindsey Yousif MD., direction and personally dictated by me on 03/15/2024. I have personally reviewed the chart and agree that the record accurately reflects my personal performance of the history, physical exam, assessment and plan.

## 2024-03-15 NOTE — HISTORY OF PRESENT ILLNESS
[FreeTextEntry1] : BERNARD CHILDS is a 38 year old presenting for annual GYN exam. Pt reports having an abdominal CT for ovarian cysts. Pt denies any pain or bleeding.  Pt has diverticulosis and has been treating.  CT scan showing mild increase in size of cyst pt has no sx

## 2024-03-18 LAB — HPV HIGH+LOW RISK DNA PNL CVX: NOT DETECTED

## 2024-03-20 LAB — CYTOLOGY CVX/VAG DOC THIN PREP: NORMAL

## 2024-04-16 NOTE — ASU DISCHARGE PLAN (ADULT/PEDIATRIC) - CARE PROVIDER_API CALL
Mrs. Cruz returns for follow-up.  Recall she is a 66-year-old last seen in office on 12/13/2023.  She has a history of abdominal bloating, constipation and abdominal pain, GERD.  She is tested positive for SIBO in 2023 status post Xifaxan.  Sucrose and fructose malfunction were unremarkable.  She has been treated with Linzess, Trulance, MiraLAX, magnesium, Motegrity, Ibsrela all with mixed response.  She ultimately saw a local dietitian who recommended supplements and lifestyle modifications.  When we saw her in December her main complaint centered around ongoing issues with GERD, she was using Tums as she failed to respond to PPIs previously.  An EGD was arranged. 4/1/2024 EGD was performed without difficulty, there was a 1 cm sliding hiatal hernia and mild antral gastritis, irregular GE junction.,  Small bowel and gastric biopsies were unremarkable, esophageal biopsies did show chronic reflux disease. She is hesitant to go back on PPI.  She increased her Motegrity 2 mg daily and Dulcolax with good response.  She is interested in antireflux surgery.  She also notes that she has had significant adhesive disease in the past and wonders if she could have adhesive disease contributing to her symptoms currently.  A colonoscopy 3/20/2023 had a tortuous and dilated colon, melanosis coli Other histories: EGD with Bravo study in 2017 normal DeMeester score 2.1 EGD at [facility] 2019 showed no gastritis or H. pylori or Mccoy's or celiac CT scan abdomen pelvis 11/20/2023 with contrast no acute finding
Wilbert Hsieh)  Obstetrics and Gynecology  [address] [place] [address], [address]  [place] [address]  Phone: [phone]  Fax: [phone]  Scheduled Appointment: 02/01/2021

## 2024-04-18 ENCOUNTER — OFFICE (OUTPATIENT)
Dept: URBAN - METROPOLITAN AREA CLINIC 90 | Facility: CLINIC | Age: 39
Setting detail: OPHTHALMOLOGY
End: 2024-04-18
Payer: COMMERCIAL

## 2024-04-18 DIAGNOSIS — H57.11: ICD-10-CM

## 2024-04-18 PROCEDURE — 92002 INTRM OPH EXAM NEW PATIENT: CPT | Performed by: OPHTHALMOLOGY

## 2024-04-18 ASSESSMENT — LID EXAM ASSESSMENTS: OD_COMMENTS: LID EVERSION NO FOREIGN BODY OR REACTION

## 2024-04-19 PROBLEM — H57.11 OCULAR PAIN; RIGHT EYE: Status: ACTIVE | Noted: 2024-04-18

## 2024-04-22 DIAGNOSIS — N63.0 UNSPECIFIED LUMP IN UNSPECIFIED BREAST: ICD-10-CM

## 2024-04-23 ENCOUNTER — RESULT REVIEW (OUTPATIENT)
Age: 39
End: 2024-04-23

## 2024-04-23 ENCOUNTER — OUTPATIENT (OUTPATIENT)
Dept: OUTPATIENT SERVICES | Facility: HOSPITAL | Age: 39
LOS: 1 days | End: 2024-04-23
Payer: COMMERCIAL

## 2024-04-23 ENCOUNTER — APPOINTMENT (OUTPATIENT)
Dept: MAMMOGRAPHY | Facility: IMAGING CENTER | Age: 39
End: 2024-04-23
Payer: COMMERCIAL

## 2024-04-23 ENCOUNTER — APPOINTMENT (OUTPATIENT)
Dept: ULTRASOUND IMAGING | Facility: IMAGING CENTER | Age: 39
End: 2024-04-23
Payer: COMMERCIAL

## 2024-04-23 DIAGNOSIS — Z98.89 OTHER SPECIFIED POSTPROCEDURAL STATES: Chronic | ICD-10-CM

## 2024-04-23 DIAGNOSIS — Z98.890 OTHER SPECIFIED POSTPROCEDURAL STATES: Chronic | ICD-10-CM

## 2024-04-23 DIAGNOSIS — Z52.811 EGG (OOCYTE) DONOR UNDER AGE 35, DESIGNATED RECIPIENT: Chronic | ICD-10-CM

## 2024-04-23 DIAGNOSIS — Z98.891 HISTORY OF UTERINE SCAR FROM PREVIOUS SURGERY: Chronic | ICD-10-CM

## 2024-04-23 DIAGNOSIS — N63.0 UNSPECIFIED LUMP IN UNSPECIFIED BREAST: ICD-10-CM

## 2024-04-23 DIAGNOSIS — Z90.89 ACQUIRED ABSENCE OF OTHER ORGANS: Chronic | ICD-10-CM

## 2024-04-23 PROCEDURE — 76642 ULTRASOUND BREAST LIMITED: CPT

## 2024-04-23 PROCEDURE — 77061 BREAST TOMOSYNTHESIS UNI: CPT | Mod: 26

## 2024-04-23 PROCEDURE — 76642 ULTRASOUND BREAST LIMITED: CPT | Mod: 26,LT

## 2024-04-23 PROCEDURE — G0279: CPT | Mod: 26

## 2024-04-23 PROCEDURE — 77065 DX MAMMO INCL CAD UNI: CPT | Mod: 26,LT

## 2024-04-23 PROCEDURE — G0279: CPT

## 2024-04-23 PROCEDURE — 77065 DX MAMMO INCL CAD UNI: CPT

## 2024-05-15 ENCOUNTER — APPOINTMENT (OUTPATIENT)
Dept: ULTRASOUND IMAGING | Facility: IMAGING CENTER | Age: 39
End: 2024-05-15
Payer: COMMERCIAL

## 2024-05-15 ENCOUNTER — OUTPATIENT (OUTPATIENT)
Dept: OUTPATIENT SERVICES | Facility: HOSPITAL | Age: 39
LOS: 1 days | End: 2024-05-15
Payer: COMMERCIAL

## 2024-05-15 DIAGNOSIS — Z90.89 ACQUIRED ABSENCE OF OTHER ORGANS: Chronic | ICD-10-CM

## 2024-05-15 DIAGNOSIS — Z98.891 HISTORY OF UTERINE SCAR FROM PREVIOUS SURGERY: Chronic | ICD-10-CM

## 2024-05-15 DIAGNOSIS — Z98.890 OTHER SPECIFIED POSTPROCEDURAL STATES: Chronic | ICD-10-CM

## 2024-05-15 DIAGNOSIS — Z52.811 EGG (OOCYTE) DONOR UNDER AGE 35, DESIGNATED RECIPIENT: Chronic | ICD-10-CM

## 2024-05-15 DIAGNOSIS — Z98.89 OTHER SPECIFIED POSTPROCEDURAL STATES: Chronic | ICD-10-CM

## 2024-05-15 DIAGNOSIS — R92.30 DENSE BREASTS, UNSPECIFIED: ICD-10-CM

## 2024-05-15 PROCEDURE — 76830 TRANSVAGINAL US NON-OB: CPT

## 2024-05-15 PROCEDURE — 76856 US EXAM PELVIC COMPLETE: CPT

## 2024-05-15 PROCEDURE — 76856 US EXAM PELVIC COMPLETE: CPT | Mod: 26,59

## 2024-05-15 PROCEDURE — 76830 TRANSVAGINAL US NON-OB: CPT | Mod: 26

## 2024-05-20 ENCOUNTER — APPOINTMENT (OUTPATIENT)
Dept: PAIN MANAGEMENT | Facility: CLINIC | Age: 39
End: 2024-05-20
Payer: COMMERCIAL

## 2024-05-20 VITALS
SYSTOLIC BLOOD PRESSURE: 105 MMHG | BODY MASS INDEX: 25.61 KG/M2 | DIASTOLIC BLOOD PRESSURE: 70 MMHG | WEIGHT: 150 LBS | HEART RATE: 74 BPM | HEIGHT: 64 IN

## 2024-05-20 PROCEDURE — 99214 OFFICE O/P EST MOD 30 MIN: CPT | Mod: 25

## 2024-05-20 PROCEDURE — 64615 CHEMODENERV MUSC MIGRAINE: CPT

## 2024-05-20 NOTE — HISTORY OF PRESENT ILLNESS
[FreeTextEntry1] : 38 y/o F with Pmhx chronic Migraines who presents today for follow up for progressively worsening Migraines.  Since restarting Botox she has significant reduction in Migraines. She is having 1 migraine day per week associated with photophobia, phonophobia, vertigo, nausea, no vomiting. She is also having milder headaches almost daily, often starting in the back of neck that spreads.  Ubrelvy prn helps better.     Headache Days/Month: Prior to Botox tx:  24-30 Current: 1-2 Headache Hours/Day:   Prior to Botox tx: 24   Current : 1  Prior meds: Topamax, Amitriptyline, Sumatriptan, Butalbital, Ribo, Magnesium, Butterbur Nurtec , Phentermine(5-6 months for weight loss  Current meds include: Botox, Rizatriptan , Ozempic, Ubrelvy, Levothyroxine, Pantoprazole, Zoloft   Social : She is  and has 2 children 5 year old and 1 year She is working as a  in [place], lives in [place]. She will be going to [facility] in July x 1 week.

## 2024-05-20 NOTE — PROCEDURE
[Chronic migraine] : Chronic migraine [Consent Signed] : consent signed [Continue Current Treatment] : continue current treatment [BOTOX 200 Units] : BOTOX 200 units; 5 units per muscle site.  procerus x 1, corragator x 2, frontalis x 4, temporalis x 8, occipitalis x 6, cervical  paraspinal x 4 and trapezius x 6 [Adverse Effects] : no adverse effects [FreeTextEntry1] : 45 units wasted  Botox [id number], exp 11/2026

## 2024-05-20 NOTE — ASSESSMENT
[FreeTextEntry1] : 38 y/o F with chronic Migraines who discontinued treatment for IVF and pregnancy and restarted Botox with significant reduction in Migraines days but still with daily headaches. On exam B/L trap and C paraspinal muscle tenderness and spasm, + pain over greater occipital notch with reproducible pain radiating forwards to temple and behind eye  Botox tx performed today w/out AE  -Failed Sumatriptan and Rizatriptan , Nurtec -Continue Ubrelvy prn as abortive - tizanidine 2 mg q hs prn

## 2024-07-12 ENCOUNTER — NON-APPOINTMENT (OUTPATIENT)
Age: 39
End: 2024-07-12

## 2024-07-15 ENCOUNTER — NON-APPOINTMENT (OUTPATIENT)
Age: 39
End: 2024-07-15

## 2024-08-07 NOTE — ED PROVIDER NOTE - CPE EDP RESP NORM
August 7, 2024       Maye Carlson MD  [address] [facility] [address]  Via In Basket      Patient: Rangel Alfaro   YOB: 1993   Date of Visit: 8/7/2024       Dear Dr. Carlson:    Thank you for referring Rangel Alfaro to me for evaluation. Below are my notes for this visit with her.    If you have questions, please do not hesitate to call me. I look forward to following your patient along with you.      Sincerely,        Long Augustine MD        CC: No Recipients  Long Augustine MD  8/7/2024  9:43 AM  Signed                                        [facility]                           Cardiology              PCP: Maye Carlson MD    Chief Complaint   Patient presents with   • Follow-up     4 week        HPI  Rangel Alfaro is a 30 year old female with a history of hypothyroidism who presents here for follow up cardiac evaluation.  She states that she has no palpitations.  The chest pain has decreased.  She notices that it happens more when she gets stressed.  Pain like feelings and sometimes on her hands as well.  She is here to follow-up on testing.  EKG demonstrates incomplete right bundle branch block.  She had a cardiac workup including a stress test in the past which was normal.  Family history of CAD.  Non-smoker.      Past Medical History  Past Medical History:   Diagnosis Date   • Anemia, iron deficiency    • Psoriasis    • Thyroid disease        Past Surgical History  Past Surgical History:   Procedure Laterality Date   • Cholecystectomy         Family History  Family History   Problem Relation Age of Onset   • Diabetes Mother         Type 1   • Hypertension Father    • Heart disease Father         Irregular heart rate       Social History  Social History     Tobacco Use   • Smoking status: Never   • Smokeless tobacco: Never   Vaping Use   • Vaping status: never used   Substance Use Topics   • Alcohol use: Not Currently   • Drug use: Never        Allergies  ALLERGIES:  No Known  Allergies    Current Medications  Current Medications    LEVONORGESTREL (MIRENA, 52 MG,) (52 MG) 20 MCG/DAY INTRAUTERINE DEVICE    1 Device by Intrauterine route 1 time.    LEVOTHYROXINE 25 MCG TABLET    Take 1 tablet by mouth daily.       Review of Systems  13 point ROS negative except those mentioned above in the HPI    Physical Exam  Visit Vitals  /77 (BP Location: LUE - Left upper extremity, Patient Position: Sitting, Cuff Size: Regular)   Pulse 73   Ht 5' 7\" (1.702 m)   Wt 75.8 kg (167 lb)   LMP 06/18/2024   SpO2 100%   BMI 26.16 kg/m²     Vital signs were reviewed today.    General: Alert/oriented  HEENT: Normacephalic, EOMI  Neck: no JVP or carotid bruit  Chest: CTA bilaterally  CV: rrr. No mgr  Abd: soft. NTD  Ext: No edema  Skin: no bruising  Neuro: CN grossly intact  Psych: cooperative      Recent Labs:   N/A    Diagnostic Testing:   EKG:  NSR with IRBBB    ECHO:  1. Left ventricle: The cavity size is normal. Wall thickness is normal.     Systolic function is normal. The ejection fraction was measured by biplane     method of disks. The ejection fraction is 67%.  2. Right ventricle: The cavity size is normal. Wall thickness is normal.     Systolic function is normal. The RV pressure during systole is 19mm Hg.  3. Tricuspid valve: There is trivial regurgitation.  4. Pericardium, extracardiac: There is no pericardial effusion    Assessment :  1. Palpitations    2. Chest pain due to myocardial ischemia, unspecified ischemic chest pain type    3. SOB (shortness of breath)      Daylili atypical symptoms for CAD/ACS but has an abnormal EKG. echocardiogram demonstrates normal LV function.  She get her Holter monitor tomorrow we will follow-up on this.  Symptoms likely secondary to anxiety/stress.  Took ibuprofen with no improvement, less likely costochondritis.    Plan:  Holter as scheduled  Follow up as needed in 6 months         Long Augustine MD  Interventional Cardiology / EndoVascular   normal...

## 2024-08-21 ENCOUNTER — APPOINTMENT (OUTPATIENT)
Dept: CARDIOLOGY | Facility: CLINIC | Age: 39
End: 2024-08-21
Payer: COMMERCIAL

## 2024-08-21 VITALS
WEIGHT: 146 LBS | BODY MASS INDEX: 24.92 KG/M2 | HEART RATE: 88 BPM | OXYGEN SATURATION: 100 % | DIASTOLIC BLOOD PRESSURE: 72 MMHG | SYSTOLIC BLOOD PRESSURE: 113 MMHG | HEIGHT: 64 IN

## 2024-08-21 DIAGNOSIS — Z01.810 ENCOUNTER FOR PREPROCEDURAL CARDIOVASCULAR EXAMINATION: ICD-10-CM

## 2024-08-21 DIAGNOSIS — I95.9 HYPOTENSION, UNSPECIFIED: ICD-10-CM

## 2024-08-21 DIAGNOSIS — R07.9 CHEST PAIN, UNSPECIFIED: ICD-10-CM

## 2024-08-21 PROCEDURE — 99203 OFFICE O/P NEW LOW 30 MIN: CPT | Mod: 25

## 2024-08-21 PROCEDURE — 93000 ELECTROCARDIOGRAM COMPLETE: CPT

## 2024-08-21 NOTE — REVIEW OF SYSTEMS
[Headache] : headache [Weight Loss (___ Lbs)] : [unfilled] ~Ulb weight loss [Chest Discomfort] : chest discomfort [Change in Appetite] : change in appetite [Dizziness] : dizziness [Fever] : no fever [SOB] : no shortness of breath [Dyspnea on exertion] : not dyspnea during exertion [Palpitations] : no palpitations [Syncope] : no syncope [Cough] : no cough [Abdominal Pain] : no abdominal pain

## 2024-08-21 NOTE — PHYSICAL EXAM
[Well Developed] : well developed [Normal S1, S2] : normal S1, S2 [No Murmur] : no murmur [No Rub] : no rub [No Gallop] : no gallop [Clear Lung Fields] : clear lung fields [Normal Gait] : normal gait [No Edema] : no edema [Moves all extremities] : moves all extremities

## 2024-08-21 NOTE — HISTORY OF PRESENT ILLNESS
[FreeTextEntry1] : 27 year old was scheduled to undergo EGD but it was cancelled after finding of low BP and increased heart rate prior to procedure. Reports prior history of low BP with occasional dizziness upon standing. Symptoms have been more frequent over the past few weeks. Also complains of recurrent sharp left upper chest pain at rest.

## 2024-08-26 ENCOUNTER — APPOINTMENT (OUTPATIENT)
Dept: PAIN MANAGEMENT | Facility: CLINIC | Age: 39
End: 2024-08-26
Payer: COMMERCIAL

## 2024-08-26 VITALS
HEART RATE: 89 BPM | SYSTOLIC BLOOD PRESSURE: 85 MMHG | HEIGHT: 64 IN | WEIGHT: 146 LBS | BODY MASS INDEX: 24.92 KG/M2 | DIASTOLIC BLOOD PRESSURE: 56 MMHG

## 2024-08-26 PROCEDURE — 64615 CHEMODENERV MUSC MIGRAINE: CPT

## 2024-08-26 PROCEDURE — 99213 OFFICE O/P EST LOW 20 MIN: CPT | Mod: 25

## 2024-08-26 NOTE — ASSESSMENT
[FreeTextEntry1] : 40 y/o F with chronic Migraines who discontinued treatment for IVF and pregnancy and restarted Botox with significant reduction in Migraines days but still with daily headaches. On exam B/L trap and C paraspinal muscle tenderness and spasm, + pain over greater occipital notch with reproducible pain radiating forwards to temple and behind eye  Botox tx performed today w/out AE  -Failed Sumatriptan and Rizatriptan , Nurtec -Continue Ubrelvy prn as abortive - tizanidine 2 mg q hs prn  -recommend ONB

## 2024-08-26 NOTE — PROCEDURE
[Chronic migraine] : Chronic migraine [Consent Signed] : consent signed [Continue Current Treatment] : continue current treatment [BOTOX 200 Units] : BOTOX 200 units; 5 units per muscle site.  procerus x 1, corragator x 2, frontalis x 4, temporalis x 8, occipitalis x 6, cervical  paraspinal x 4 and trapezius x 6 [Adverse Effects] : no adverse effects [FreeTextEntry1] : 45 units wasted  Botox [id number], exp 12/2026

## 2024-08-26 NOTE — HISTORY OF PRESENT ILLNESS
[FreeTextEntry1] : 38 y/o F with Pmhx chronic Migraines who presents today for follow up for progressively worsening Migraines. Overall she is feeling well in regards to headaches and continues to respond well to Botox with significant reduction in the intensity and frequency of her headaches. She is having 1 migraine day per week associated with photophobia, phonophobia, vertigo, nausea, no vomiting. She is also having milder headaches almost daily, often starting in the back of neck that spreads.  Ubrelvy prn helps better.  Last 3 weeks with severe pain right occipital radiating forward, not responding to current meds.  Avoids NSAIDS given gerd but taking Excedrin prn, ice packs. Previously responded well to B/L ONB.   She has lost 75 lns in the last year and will be stopping Ozempic soon.    Headache Days/Month: Prior to Botox tx:  24-30 Current: 1-2 Headache Hours/Day:   Prior to Botox tx: 24   Current : 1  Prior meds: Topamax, Amitriptyline, Sumatriptan, Butalbital, Ribo, Magnesium, Butterbur Nurtec , Phentermine(5-6 months for weight loss  Current meds include: Botox, Rizatriptan , Ozempic, Ubrelvy, Levothyroxine, Pantoprazole, Zoloft   Social : She is  and has 2 children 7 year old and 1.5 year She is working as a  in [employer], lives in [place]. She will be going to [facility] in July x 1 week.

## 2024-08-27 ENCOUNTER — RESULT REVIEW (OUTPATIENT)
Age: 39
End: 2024-08-27

## 2024-08-28 ENCOUNTER — RESULT REVIEW (OUTPATIENT)
Age: 39
End: 2024-08-28

## 2024-08-28 ENCOUNTER — OUTPATIENT (OUTPATIENT)
Dept: OUTPATIENT SERVICES | Facility: HOSPITAL | Age: 39
LOS: 1 days | End: 2024-08-28
Payer: COMMERCIAL

## 2024-08-28 DIAGNOSIS — Z52.811 EGG (OOCYTE) DONOR UNDER AGE 35, DESIGNATED RECIPIENT: Chronic | ICD-10-CM

## 2024-08-28 DIAGNOSIS — Z98.890 OTHER SPECIFIED POSTPROCEDURAL STATES: Chronic | ICD-10-CM

## 2024-08-28 DIAGNOSIS — R07.9 CHEST PAIN, UNSPECIFIED: ICD-10-CM

## 2024-08-28 DIAGNOSIS — Z90.89 ACQUIRED ABSENCE OF OTHER ORGANS: Chronic | ICD-10-CM

## 2024-08-28 DIAGNOSIS — Z98.89 OTHER SPECIFIED POSTPROCEDURAL STATES: Chronic | ICD-10-CM

## 2024-08-28 DIAGNOSIS — Z98.891 HISTORY OF UTERINE SCAR FROM PREVIOUS SURGERY: Chronic | ICD-10-CM

## 2024-08-28 PROCEDURE — 93018 CV STRESS TEST I&R ONLY: CPT

## 2024-08-28 PROCEDURE — 93306 TTE W/DOPPLER COMPLETE: CPT | Mod: 26

## 2024-08-28 PROCEDURE — 93016 CV STRESS TEST SUPVJ ONLY: CPT

## 2024-08-28 PROCEDURE — 93017 CV STRESS TEST TRACING ONLY: CPT

## 2024-08-28 PROCEDURE — 93306 TTE W/DOPPLER COMPLETE: CPT

## 2024-08-29 DIAGNOSIS — R07.9 CHEST PAIN, UNSPECIFIED: ICD-10-CM

## 2024-10-07 DIAGNOSIS — Z12.31 ENCOUNTER FOR SCREENING MAMMOGRAM FOR MALIGNANT NEOPLASM OF BREAST: ICD-10-CM

## 2024-10-07 DIAGNOSIS — N63.20 UNSPECIFIED LUMP IN THE LEFT BREAST, UNSPECIFIED QUADRANT: ICD-10-CM

## 2024-10-08 ENCOUNTER — APPOINTMENT (OUTPATIENT)
Dept: ULTRASOUND IMAGING | Facility: IMAGING CENTER | Age: 39
End: 2024-10-08
Payer: COMMERCIAL

## 2024-10-08 ENCOUNTER — RESULT REVIEW (OUTPATIENT)
Age: 39
End: 2024-10-08

## 2024-10-08 ENCOUNTER — APPOINTMENT (OUTPATIENT)
Dept: MAMMOGRAPHY | Facility: IMAGING CENTER | Age: 39
End: 2024-10-08
Payer: COMMERCIAL

## 2024-10-08 ENCOUNTER — OUTPATIENT (OUTPATIENT)
Dept: OUTPATIENT SERVICES | Facility: HOSPITAL | Age: 39
LOS: 1 days | End: 2024-10-08
Payer: COMMERCIAL

## 2024-10-08 DIAGNOSIS — Z52.811 EGG (OOCYTE) DONOR UNDER AGE 35, DESIGNATED RECIPIENT: Chronic | ICD-10-CM

## 2024-10-08 DIAGNOSIS — Z98.89 OTHER SPECIFIED POSTPROCEDURAL STATES: Chronic | ICD-10-CM

## 2024-10-08 DIAGNOSIS — Z98.890 OTHER SPECIFIED POSTPROCEDURAL STATES: Chronic | ICD-10-CM

## 2024-10-08 DIAGNOSIS — Z12.31 ENCOUNTER FOR SCREENING MAMMOGRAM FOR MALIGNANT NEOPLASM OF BREAST: ICD-10-CM

## 2024-10-08 DIAGNOSIS — Z98.891 HISTORY OF UTERINE SCAR FROM PREVIOUS SURGERY: Chronic | ICD-10-CM

## 2024-10-08 DIAGNOSIS — Z90.89 ACQUIRED ABSENCE OF OTHER ORGANS: Chronic | ICD-10-CM

## 2024-10-08 PROCEDURE — 76641 ULTRASOUND BREAST COMPLETE: CPT | Mod: 26,50

## 2024-10-08 PROCEDURE — 77066 DX MAMMO INCL CAD BI: CPT

## 2024-10-08 PROCEDURE — G0279: CPT

## 2024-10-08 PROCEDURE — 77066 DX MAMMO INCL CAD BI: CPT | Mod: 26

## 2024-10-08 PROCEDURE — 77062 BREAST TOMOSYNTHESIS BI: CPT | Mod: 26

## 2024-10-08 PROCEDURE — G0279: CPT | Mod: 26

## 2024-10-08 PROCEDURE — 76641 ULTRASOUND BREAST COMPLETE: CPT

## 2024-10-23 NOTE — OB RN PATIENT PROFILE - SPO2 (%)
Department of Anesthesiology  Preprocedure Note       Name:  Edward Barlow   Age:  73 y.o.  :  1950                                          MRN:  [id number]         Date:  10/23/2024      Surgeon: Surgeon(s):  Tom Dias MD    Procedure: Procedure(s):  ENDOSCOPIC RETROGRADE CHOLANGIOPANCREATOGRAPHY  ENDOSCOPIC ULTRASOUND    Medications prior to admission:   Prior to Admission medications    Medication Sig Start Date End Date Taking? Authorizing Provider   Multiple Vitamins-Minerals (THERAPEUTIC MULTIVITAMIN-MINERALS) tablet Take 1 tablet by mouth daily   Yes Benny Sheriff MD   Omega-3 Fatty Acids (FISH OIL) 1000 MG capsule Take by mouth daily   Yes Benny Sheriff MD   vitamin E 400 UNIT capsule Take 1 capsule by mouth daily   Yes Benny Sheriff MD   vitamin C (ASCORBIC ACID) 500 MG tablet Take 1 tablet by mouth daily   Yes Benny Sheriff MD   cholecalciferol (VITAMIN D3) 400 UNIT TABS tablet Take 1 tablet by mouth daily   Yes Benny Sheriff MD   Coenzyme Q10 (CO Q 10) 10 MG CAPS Take by mouth   Yes Benny Sheriff MD   amLODIPine (NORVASC) 5 MG tablet Take 1 tablet by mouth daily 3/28/24  Yes Benny Sheriff MD   labetalol (NORMODYNE) 100 MG tablet TAKE 1 TABLET TWICE A DAY 4/3/17  Yes Candelario Pastrana MD   atorvastatin (LIPITOR) 10 MG tablet TAKE 1 TABLET DAILY  Patient taking differently: 4 tablets 17  Yes Candelario Pastrana MD   finasteride (PROSCAR) 5 MG tablet TAKE 1 TABLET DAILY 17  Yes Candelario Pastrana MD   triamcinolone (ARISTOCORT) 0.5 % cream Apply topically 3 times daily. 16  Yes Candelario Pastrana MD   aspirin 81 MG EC tablet Take 1 tablet by mouth daily   Yes Benny Sheriff MD   ondansetron (ZOFRAN) 4 MG tablet Take 1 tablet by mouth 3 times daily as needed for Nausea or Vomiting  Patient not taking: Reported on 10/9/2024 9/25/24   Woo Antunez, ARABELLA   azelastine (ASTELIN) 0.1 % nasal spray 2 sprays by Nasal route 2 times daily Use  100

## 2024-10-30 ENCOUNTER — TRANSCRIPTION ENCOUNTER (OUTPATIENT)
Age: 39
End: 2024-10-30

## 2024-11-06 ENCOUNTER — NON-APPOINTMENT (OUTPATIENT)
Age: 39
End: 2024-11-06

## 2024-11-26 ENCOUNTER — APPOINTMENT (OUTPATIENT)
Dept: PAIN MANAGEMENT | Facility: CLINIC | Age: 39
End: 2024-11-26
Payer: COMMERCIAL

## 2024-11-26 VITALS
WEIGHT: 145 LBS | BODY MASS INDEX: 24.75 KG/M2 | HEART RATE: 66 BPM | SYSTOLIC BLOOD PRESSURE: 126 MMHG | HEIGHT: 64 IN | DIASTOLIC BLOOD PRESSURE: 71 MMHG

## 2024-11-26 PROCEDURE — 64615 CHEMODENERV MUSC MIGRAINE: CPT

## 2024-11-26 PROCEDURE — 99214 OFFICE O/P EST MOD 30 MIN: CPT | Mod: 25

## 2025-01-26 ENCOUNTER — EMERGENCY (EMERGENCY)
Facility: HOSPITAL | Age: 40
LOS: 1 days | Discharge: HOME | End: 2025-01-26
Attending: EMERGENCY MEDICINE
Payer: COMMERCIAL

## 2025-01-26 VITALS
OXYGEN SATURATION: 97 % | SYSTOLIC BLOOD PRESSURE: 102 MMHG | TEMPERATURE: 98 F | WEIGHT: 149.91 LBS | HEART RATE: 106 BPM | HEIGHT: 64 IN | DIASTOLIC BLOOD PRESSURE: 69 MMHG | RESPIRATION RATE: 18 BRPM

## 2025-01-26 VITALS
SYSTOLIC BLOOD PRESSURE: 99 MMHG | TEMPERATURE: 98 F | DIASTOLIC BLOOD PRESSURE: 66 MMHG | RESPIRATION RATE: 18 BRPM | OXYGEN SATURATION: 98 % | HEART RATE: 79 BPM

## 2025-01-26 DIAGNOSIS — Z98.891 HISTORY OF UTERINE SCAR FROM PREVIOUS SURGERY: Chronic | ICD-10-CM

## 2025-01-26 DIAGNOSIS — Z98.89 OTHER SPECIFIED POSTPROCEDURAL STATES: Chronic | ICD-10-CM

## 2025-01-26 DIAGNOSIS — Z98.890 OTHER SPECIFIED POSTPROCEDURAL STATES: Chronic | ICD-10-CM

## 2025-01-26 DIAGNOSIS — Z90.89 ACQUIRED ABSENCE OF OTHER ORGANS: Chronic | ICD-10-CM

## 2025-01-26 DIAGNOSIS — Z52.811 EGG (OOCYTE) DONOR UNDER AGE 35, DESIGNATED RECIPIENT: Chronic | ICD-10-CM

## 2025-01-26 LAB
ALBUMIN SERPL ELPH-MCNC: 4.9 G/DL — SIGNIFICANT CHANGE UP (ref 3.3–5)
ALP SERPL-CCNC: 76 U/L — SIGNIFICANT CHANGE UP (ref 40–120)
ALT FLD-CCNC: 17 U/L — SIGNIFICANT CHANGE UP (ref 10–45)
ANION GAP SERPL CALC-SCNC: 13 MMOL/L — SIGNIFICANT CHANGE UP (ref 5–17)
APPEARANCE UR: CLEAR — SIGNIFICANT CHANGE UP
AST SERPL-CCNC: 13 U/L — SIGNIFICANT CHANGE UP (ref 10–40)
BACTERIA # UR AUTO: ABNORMAL /HPF
BASOPHILS # BLD AUTO: 0.02 K/UL — SIGNIFICANT CHANGE UP (ref 0–0.2)
BASOPHILS NFR BLD AUTO: 0.2 % — SIGNIFICANT CHANGE UP (ref 0–2)
BILIRUB SERPL-MCNC: 1 MG/DL — SIGNIFICANT CHANGE UP (ref 0.2–1.2)
BILIRUB UR-MCNC: NEGATIVE — SIGNIFICANT CHANGE UP
BUN SERPL-MCNC: 14 MG/DL — SIGNIFICANT CHANGE UP (ref 7–23)
CALCIUM SERPL-MCNC: 9.7 MG/DL — SIGNIFICANT CHANGE UP (ref 8.4–10.5)
CAST: 0 /LPF — SIGNIFICANT CHANGE UP (ref 0–4)
CHLORIDE SERPL-SCNC: 102 MMOL/L — SIGNIFICANT CHANGE UP (ref 96–108)
CO2 SERPL-SCNC: 24 MMOL/L — SIGNIFICANT CHANGE UP (ref 22–31)
COLOR SPEC: YELLOW — SIGNIFICANT CHANGE UP
CREAT SERPL-MCNC: 0.89 MG/DL — SIGNIFICANT CHANGE UP (ref 0.5–1.3)
DIFF PNL FLD: NEGATIVE — SIGNIFICANT CHANGE UP
EGFR: 85 ML/MIN/1.73M2 — SIGNIFICANT CHANGE UP
EOSINOPHIL # BLD AUTO: 0.25 K/UL — SIGNIFICANT CHANGE UP (ref 0–0.5)
EOSINOPHIL NFR BLD AUTO: 2.8 % — SIGNIFICANT CHANGE UP (ref 0–6)
FLUAV AG NPH QL: SIGNIFICANT CHANGE UP
FLUBV AG NPH QL: SIGNIFICANT CHANGE UP
GAS PNL BLDV: SIGNIFICANT CHANGE UP
GLUCOSE SERPL-MCNC: 89 MG/DL — SIGNIFICANT CHANGE UP (ref 70–99)
GLUCOSE UR QL: NEGATIVE MG/DL — SIGNIFICANT CHANGE UP
HCG SERPL-ACNC: <2 MIU/ML — SIGNIFICANT CHANGE UP
HCT VFR BLD CALC: 38 % — SIGNIFICANT CHANGE UP (ref 34.5–45)
HGB BLD-MCNC: 13 G/DL — SIGNIFICANT CHANGE UP (ref 11.5–15.5)
IMM GRANULOCYTES NFR BLD AUTO: 0.3 % — SIGNIFICANT CHANGE UP (ref 0–0.9)
KETONES UR-MCNC: NEGATIVE MG/DL — SIGNIFICANT CHANGE UP
LEUKOCYTE ESTERASE UR-ACNC: NEGATIVE — SIGNIFICANT CHANGE UP
LIDOCAIN IGE QN: 16 U/L — SIGNIFICANT CHANGE UP (ref 7–60)
LYMPHOCYTES # BLD AUTO: 1.35 K/UL — SIGNIFICANT CHANGE UP (ref 1–3.3)
LYMPHOCYTES # BLD AUTO: 15.2 % — SIGNIFICANT CHANGE UP (ref 13–44)
MAGNESIUM SERPL-MCNC: 2.3 MG/DL — SIGNIFICANT CHANGE UP (ref 1.6–2.6)
MCHC RBC-ENTMCNC: 30.2 PG — SIGNIFICANT CHANGE UP (ref 27–34)
MCHC RBC-ENTMCNC: 34.2 G/DL — SIGNIFICANT CHANGE UP (ref 32–36)
MCV RBC AUTO: 88.2 FL — SIGNIFICANT CHANGE UP (ref 80–100)
MONOCYTES # BLD AUTO: 0.75 K/UL — SIGNIFICANT CHANGE UP (ref 0–0.9)
MONOCYTES NFR BLD AUTO: 8.5 % — SIGNIFICANT CHANGE UP (ref 2–14)
NEUTROPHILS # BLD AUTO: 6.46 K/UL — SIGNIFICANT CHANGE UP (ref 1.8–7.4)
NEUTROPHILS NFR BLD AUTO: 73 % — SIGNIFICANT CHANGE UP (ref 43–77)
NITRITE UR-MCNC: NEGATIVE — SIGNIFICANT CHANGE UP
NRBC # BLD: 0 /100 WBCS — SIGNIFICANT CHANGE UP (ref 0–0)
PH UR: 6 — SIGNIFICANT CHANGE UP (ref 5–8)
PHOSPHATE SERPL-MCNC: 2.9 MG/DL — SIGNIFICANT CHANGE UP (ref 2.5–4.5)
PLATELET # BLD AUTO: 262 K/UL — SIGNIFICANT CHANGE UP (ref 150–400)
POTASSIUM SERPL-MCNC: 4.1 MMOL/L — SIGNIFICANT CHANGE UP (ref 3.5–5.3)
POTASSIUM SERPL-SCNC: 4.1 MMOL/L — SIGNIFICANT CHANGE UP (ref 3.5–5.3)
PROT SERPL-MCNC: 7.9 G/DL — SIGNIFICANT CHANGE UP (ref 6–8.3)
PROT UR-MCNC: NEGATIVE MG/DL — SIGNIFICANT CHANGE UP
RBC # BLD: 4.31 M/UL — SIGNIFICANT CHANGE UP (ref 3.8–5.2)
RBC # FLD: 12.3 % — SIGNIFICANT CHANGE UP (ref 10.3–14.5)
RBC CASTS # UR COMP ASSIST: 0 /HPF — SIGNIFICANT CHANGE UP (ref 0–4)
REVIEW: SIGNIFICANT CHANGE UP
RSV RNA NPH QL NAA+NON-PROBE: SIGNIFICANT CHANGE UP
SARS-COV-2 RNA SPEC QL NAA+PROBE: SIGNIFICANT CHANGE UP
SODIUM SERPL-SCNC: 139 MMOL/L — SIGNIFICANT CHANGE UP (ref 135–145)
SP GR SPEC: >1.03 — HIGH (ref 1–1.03)
SQUAMOUS # UR AUTO: 3 /HPF — SIGNIFICANT CHANGE UP (ref 0–5)
UROBILINOGEN FLD QL: 0.2 MG/DL — SIGNIFICANT CHANGE UP (ref 0.2–1)
WBC # BLD: 8.86 K/UL — SIGNIFICANT CHANGE UP (ref 3.8–10.5)
WBC # FLD AUTO: 8.86 K/UL — SIGNIFICANT CHANGE UP (ref 3.8–10.5)
WBC UR QL: 5 /HPF — SIGNIFICANT CHANGE UP (ref 0–5)

## 2025-01-26 PROCEDURE — 85014 HEMATOCRIT: CPT

## 2025-01-26 PROCEDURE — 82803 BLOOD GASES ANY COMBINATION: CPT

## 2025-01-26 PROCEDURE — 85018 HEMOGLOBIN: CPT

## 2025-01-26 PROCEDURE — 81001 URINALYSIS AUTO W/SCOPE: CPT

## 2025-01-26 PROCEDURE — 83690 ASSAY OF LIPASE: CPT

## 2025-01-26 PROCEDURE — 85025 COMPLETE CBC W/AUTO DIFF WBC: CPT

## 2025-01-26 PROCEDURE — 83605 ASSAY OF LACTIC ACID: CPT

## 2025-01-26 PROCEDURE — 96374 THER/PROPH/DIAG INJ IV PUSH: CPT | Mod: XU

## 2025-01-26 PROCEDURE — 87086 URINE CULTURE/COLONY COUNT: CPT

## 2025-01-26 PROCEDURE — 84295 ASSAY OF SERUM SODIUM: CPT

## 2025-01-26 PROCEDURE — 87637 SARSCOV2&INF A&B&RSV AMP PRB: CPT

## 2025-01-26 PROCEDURE — 87077 CULTURE AEROBIC IDENTIFY: CPT

## 2025-01-26 PROCEDURE — 82947 ASSAY GLUCOSE BLOOD QUANT: CPT

## 2025-01-26 PROCEDURE — 84702 CHORIONIC GONADOTROPIN TEST: CPT

## 2025-01-26 PROCEDURE — 84100 ASSAY OF PHOSPHORUS: CPT

## 2025-01-26 PROCEDURE — 74177 CT ABD & PELVIS W/CONTRAST: CPT | Mod: 26

## 2025-01-26 PROCEDURE — 83735 ASSAY OF MAGNESIUM: CPT

## 2025-01-26 PROCEDURE — 87186 SC STD MICRODIL/AGAR DIL: CPT

## 2025-01-26 PROCEDURE — 84132 ASSAY OF SERUM POTASSIUM: CPT

## 2025-01-26 PROCEDURE — 82330 ASSAY OF CALCIUM: CPT

## 2025-01-26 PROCEDURE — 96375 TX/PRO/DX INJ NEW DRUG ADDON: CPT

## 2025-01-26 PROCEDURE — 82435 ASSAY OF BLOOD CHLORIDE: CPT

## 2025-01-26 PROCEDURE — 99284 EMERGENCY DEPT VISIT MOD MDM: CPT | Mod: 25

## 2025-01-26 PROCEDURE — 99285 EMERGENCY DEPT VISIT HI MDM: CPT

## 2025-01-26 PROCEDURE — 74177 CT ABD & PELVIS W/CONTRAST: CPT | Mod: MC

## 2025-01-26 PROCEDURE — 80053 COMPREHEN METABOLIC PANEL: CPT

## 2025-01-26 RX ORDER — SODIUM CHLORIDE 9 MG/ML
1000 INJECTION, SOLUTION INTRAMUSCULAR; INTRAVENOUS; SUBCUTANEOUS ONCE
Refills: 0 | Status: COMPLETED | OUTPATIENT
Start: 2025-01-26 | End: 2025-01-26

## 2025-01-26 RX ORDER — METOCLOPRAMIDE 10 MG/1
10 TABLET ORAL ONCE
Refills: 0 | Status: COMPLETED | OUTPATIENT
Start: 2025-01-26 | End: 2025-01-26

## 2025-01-26 RX ORDER — ACETAMINOPHEN 80 MG/.8ML
1000 SOLUTION/ DROPS ORAL ONCE
Refills: 0 | Status: COMPLETED | OUTPATIENT
Start: 2025-01-26 | End: 2025-01-26

## 2025-01-26 RX ORDER — ONDANSETRON 4 MG/1
1 TABLET ORAL
Qty: 1 | Refills: 0
Start: 2025-01-26 | End: 2025-01-28

## 2025-01-26 RX ADMIN — ACETAMINOPHEN 1000 MILLIGRAM(S): 80 SOLUTION/ DROPS ORAL at 13:15

## 2025-01-26 RX ADMIN — ACETAMINOPHEN 400 MILLIGRAM(S): 80 SOLUTION/ DROPS ORAL at 13:37

## 2025-01-26 RX ADMIN — METOCLOPRAMIDE 10 MILLIGRAM(S): 10 TABLET ORAL at 13:37

## 2025-01-26 RX ADMIN — SODIUM CHLORIDE 1000 MILLILITER(S): 9 INJECTION, SOLUTION INTRAMUSCULAR; INTRAVENOUS; SUBCUTANEOUS at 13:37

## 2025-01-26 RX ADMIN — SODIUM CHLORIDE 1000 MILLILITER(S): 9 INJECTION, SOLUTION INTRAMUSCULAR; INTRAVENOUS; SUBCUTANEOUS at 15:58

## 2025-01-26 NOTE — ED ADULT NURSE REASSESSMENT NOTE - NS ED NURSE REASSESS COMMENT FT1
Pt currently lying in stretcher with side rails up, no signs of distress. Pt endorses "pain still there but its better." VS stable. safety and comfort provided.

## 2025-01-26 NOTE — ED PROVIDER NOTE - NSFOLLOWUPINSTRUCTIONS_ED_ALL_ED_FT
Were seen for nausea vomiting and diarrhea.  You had blood work and urine test and CT scan done.  These results are included in your paperwork.  We have prescribed you Zofran.  Please take it as prescribed as needed.  Please return to emergency room for fever, vomiting, increasing abdominal pain, or any new/concerning symptoms.  Please follow-up with your primary care doctor as needed.

## 2025-01-26 NOTE — ED ADULT NURSE NOTE - CAS EDN DISCHARGE ASSESSMENT
Hospitalist Progress Note    Patient:  Niurka Chi    YOB: 1964  Unit/Bed:7K-02/002-A  Date of Admission: 2024    Assessment/Plan:    Left sided nephrolithiasis: Positive left-sided flank pain x 3 days.  Has history of kidney stones requiring stent placement.  UA positive for large amount of blood and trace leukocyte Estrace.  CT abdomen/pelvis showing left renal pelvic calculus up to 1 cm with mild inflammation of the left renal pelvis.  No hydroureteronephrosis. Flomax.  Supportive care for pain control.  Okay to eat today, n.p.o. midnight   Urology following-plan for cystoscopy with lithotripsy tomorrow.  Hold ASA and Plavix.    Essential hypertension: Mildly elevated on admission.  Possibly pain related.  Continue lisinopril (substituted for home ramipril).    NIDDMII: controlled. Last A1c 10/2022 6.6%.  Controlled with diet.  Had been on Ozempic previously, no longer taking.  Carb controlled diet once cleared to take p.o. per urology.    CAD s/p CAB2022.  ASA/Plavix held for anticipated procedure, resume when okay with urology.  Continue Lipitor    PAD: With history of RCA stent, L femoral-pop interposition graft, and PTA of fem-pop  Cont ASA/Plavix/Statin    Depression: home venlafaxine       Chief Complaint: Flank pain    HPI / Hospital Course:   Per H&P  \"Niurka Chi is a 59 y.o. female with PMHx of nephrolithiasis, HTN, DM2, CAD, PAD, and depression who presented to [facility] with chief complaint of left flank pain over the last 3 days. She denies any associated fevers or chills.  No urinary symptoms. No nausea or vomiting.  Has had kidney stones in the past requiring stent placement.  She said the pain felt similar so she came in for further evaluation.   ED course: A CT abdomen and pelvis was obtained that showed bilateral renal calyceal calculi up to 0.7 cm. Left renal pelvic calculus up to 1 x 0.7 cm with renal pelvic wall thickening/inflammations. No    History     Chief Complaint:  Urinary Retention       HPI   Chaz Dye is a 78 year old male who presents with decreased urinary output.  He states he has not been able to urinate since yesterday.  He is having increasing pressure and discomfort suprapubically and now is coming to the ER for relief.  He denies any fever, flank pain.      Independent Historian:   None - Patient Only    Review of External Notes: none     ROS:  Review of Systems    Allergies:  Bactrim [Sulfamethoxazole W/Trimethoprim]  Cefepime  Ciprofloxacin  Imipenem  Vancomycin     Medications:    tamsulosin (FLOMAX) 0.4 MG capsule  aspirin  MG tablet  Carvedilol (COREG PO)  cetirizine (ZYRTEC) 10 MG tablet  fluticasone (FLOVENT DISKUS) 50 MCG/BLIST AEPB  latanoprost (XALATAN) 0.005 % ophthalmic solution  Pantoprazole Sodium (PROTONIX PO)  Rosuvastatin Calcium (CRESTOR PO)  Valsartan (DIOVAN PO)  VITAMIN D, CHOLECALCIFEROL, PO        Past Medical History:    Past Medical History:   Diagnosis Date     Diffuse large b-cell lymphoma (H)      Hyperlipidemia      Hyperparathyroidism (H)      Hypertension      Insomnia      Melanoma (H)      MS (multiple sclerosis) (H)      Osteopenia      Reflux esophagitis      Schatzki's ring      Tinnitus      Vitamin D deficiency        Past Surgical History:    Past Surgical History:   Procedure Laterality Date     APPENDECTOMY       COLONOSCOPY       HERNIA REPAIR       ORTHOPEDIC SURGERY      rotator cuff repair, meniscectomy     PARATHYROIDECTOMY       PHACOEMULSIFICATION CLEAR CORNEA WITH STANDARD INTRAOCULAR LENS IMPLANT Right 8/22/2016    Procedure: PHACOEMULSIFICATION CLEAR CORNEA WITH STANDARD INTRAOCULAR LENS IMPLANT;  Surgeon: Edgardo Vegas MD;  Location: [facility]     PHACOEMULSIFICATION CLEAR CORNEA WITH STANDARD INTRAOCULAR LENS IMPLANT Left 9/6/2016    Procedure: PHACOEMULSIFICATION CLEAR CORNEA WITH STANDARD INTRAOCULAR LENS IMPLANT;  Surgeon: Edgardo Vegas MD;  Location: [facility]     SOFT  "TISSUE SURGERY      right wrist cyst removal        Family History:    family history is not on file.    Social History:   reports that he has never smoked. He does not have any smokeless tobacco history on file. He reports current alcohol use.  PCP: No primary care provider on file.     Physical Exam     Patient Vitals for the past 24 hrs:   BP Temp Temp src Pulse Resp SpO2 Height Weight   04/15/23 1041 (!) 155/94 97  F (36.1  C) Temporal 117 22 98 % 1.753 m (5' 9\") 88.5 kg (195 lb)        Physical Exam  General/Appearance: appears stated age, well-groomed, appears very uncomfortable  Eyes: EOMI, no scleral injection, no icterus  ENT: MMM  Neck: supple, nl ROM, no stiffness  Cardiovascular: RRR, nl S1S2, no m/r/g, 2+ pulses in all 4 extremities, cap refill <2sec  Respiratory: CTAB, good air movement throughout, no wheezes/rhonchi/rales, no increased WOB, no retractions  Back: no lesions  GI: abd soft, suprapubic distention and ttp,  no HSM, no rebound, no guarding, nl BS  MSK: LANG, good tone, no bony abnormality  Skin: warm and well-perfused, no rash, no edema, no ecchymosis, nl turgor  Neuro: GCS 15, alert and oriented, no gross focal neuro deficits  Psych: interacts appropriately  Heme: no petechia, no purpura, no active bleeding        Emergency Department Course       Laboratory:  Labs Ordered and Resulted from Time of ED Arrival to Time of ED Departure   ROUTINE UA WITH MICROSCOPIC REFLEX TO CULTURE - Abnormal       Result Value    Color Urine Straw      Appearance Urine Clear      Glucose Urine Negative      Bilirubin Urine Negative      Ketones Urine Negative      Specific Gravity Urine 1.007      Blood Urine Negative      pH Urine 7.0      Protein Albumin Urine Negative      Urobilinogen Urine Normal      Nitrite Urine Negative      Leukocyte Esterase Urine Negative      Amorphous Crystals Urine Few (*)     RBC Urine 1      WBC Urine 1          Procedures   Bedside Bladder Scan - 600cc    Emergency " Department Course & Assessments:    Interventions:  Medications - No data to display     Assessments:  1145 Updated. Sxs resolved with aponte in place    Independent Interpretation (X-rays, CTs, rhythm strip):  None    Consultations/Discussion of Management or Tests:  None        Social Determinants of Health affecting care:   None    Disposition:  The patient was discharged to home.     Impression & Plan      Medical Decision Making:  This patient is a pleasant 78-year-old male who presents with decreased urine output for the past 24 hours with increasing suprapubic discomfort.  Bedside ultrasound confirmed urinary retention which was relieved with Aponte placement.  Urinalysis is unremarkable.  I suspect this has to do with prostate enlargement.  Patient feels comfortable with discharge with leg bag and will follow-up with urology as an outpatient.  Diagnosis:    ICD-10-CM    1. Urinary retention  R33.9            Discharge Medications:  New Prescriptions    TAMSULOSIN (FLOMAX) 0.4 MG CAPSULE    Take 1 capsule (0.4 mg) by mouth daily for 7 days           Mariya Lucas MD  04/15/23 5579     Alert and oriented to person, place and time

## 2025-01-26 NOTE — ED PROVIDER NOTE - PATIENT PORTAL LINK FT
You can access the FollowMyHealth Patient Portal offered by [facility] by registering at the following website: [email or web address]. By joining [employer]’s FollowMyHealth portal, you will also be able to view your health information using other applications (apps) compatible with our system.

## 2025-01-26 NOTE — ED PROVIDER NOTE - PROGRESS NOTE DETAILS
Sukhwinder Prieto, PGY3 - patient reassessed. Tolerating PO. labs and ua reviewed. CT reviewed. communicated with patient. Patient verbalized understanding and agrees with the plan.

## 2025-01-26 NOTE — ED ADULT NURSE NOTE - CHPI ED NUR SYMPTOMS NEG
Follow up with your OBGYN. Call office on Tuesday to schedule hysteroscopy.   Take Tylenol 650-1000 mg every 6 hours as needed for pain. Do not exceed 4,000 mg in a 24 hour period. Take Ibuprofen 600-800 mg every 6 hours as needed for moderate pain -- take with food.  If symptoms worsen or you develop vaginal bleeding, dizziness, or loss of consciousness please return to the ED.  -------------  Pelvic Pain, Female  Pelvic pain is pain in your lower abdomen, below your belly button and between your hips. The pain may start suddenly (be acute), keep coming back (be recurring), or last a long time (become chronic). Pelvic pain that lasts longer than 6 months is considered chronic.    Pelvic pain may affect your:  Reproductive organs.  Urinary system.  Digestive tract.  Musculoskeletal system.  There are many potential causes of pelvic pain. Sometimes, the pain can be a result of digestive or urinary conditions, strained muscles or ligaments, or reproductive conditions. Sometimes the cause of pelvic pain is not known.    Follow these instructions at home:  Person sitting at a desk and writing in a notebook.  Take over-the-counter and prescription medicines only as told by your health care provider.  Rest as told by your health care provider.  Do not have sex if it hurts.  Keep a journal of your pelvic pain. Write down:  When the pain started.  Where the pain is located.  What seems to make the pain better or worse, such as food or your monthly period (menstrual cycle).  Any symptoms you have along with the pain.  Keep all follow-up visits. This is important.  Contact a health care provider if:  Medicine does not help your pain, or your pain comes back.  You have new symptoms.  You have abnormal vaginal discharge or bleeding, including bleeding after menopause.  You have a fever or chills.  You are constipated.  You have blood in your urine or stool (feces).  You have foul-smelling urine.  You feel weak or light-headed.  Get help right away if:  You have sudden severe pain.  Your pain gets steadily worse.  You have severe pain along with fever, nausea, vomiting, or excessive sweating.  You lose consciousness.  These symptoms may represent a serious problem that is an emergency. Do not wait to see if the symptoms will go away. Get medical help right away. Call your local emergency services (911 in the [place] Do not drive yourself to the hospital.    Summary  Pelvic pain is pain in your lower abdomen, below your belly button and between your hips.  There are many potential causes of pelvic pain.  Keep a journal of your pelvic pain.  This information is not intended to replace advice given to you by your health care provider. Make sure you discuss any questions you have with your health care provider.
no chills/no decreased eating/drinking/no dizziness/no fever/no tingling/no weakness

## 2025-01-26 NOTE — ED PROVIDER NOTE - CLINICAL SUMMARY MEDICAL DECISION MAKING FREE TEXT BOX
Sukhwinder Prieto, PGY3 - This is a 39-year-old female with significant surgical history of hysterectomy and salpingectomy with history placenta accreta, hypothyroidism, migraines, presenting today for nausea vomiting nonbloody diarrhea and left lower quadrant and left upper quadrant abdominal pain.  Also reports history of diverticulosis.  No recent travel or surgery.  No antibiotics use recently.  No fevers. No vaginal bleeding.  No cough.   at bedside had COVID infection recently and is recovering from that.  No blood in vomit.  Has taken Zofran 6 hours prior to presentation with mild relief.  Also reports usual migraine headache not worse than usual.  Vital signs show slight tachycardia to 106 otherwise within normal limits.  Physical exam shows uncomfortable female alert and oriented x 4 and moving all extremities and following commands.  No murmurs.  No crackles or wheezing.  Abdomen soft and tenderness to palpation of the left upper and left lower quadrant area.  No lower extremity swelling or pain to palpation.  At this time concern for diverticulitis versus intra-abdominal infection versus viral gastroenteritis.  Given complex surgical history we will obtain CT abdomen pelvis with IV contrast.  Will obtain CBC CMP mag Phos lipase hCG and urine studies.  Will obtain VBG also.  Will give Ofirmev, Reglan and normal saline.  Disposition pending labs imaging and reassessment.

## 2025-01-26 NOTE — ED ADULT NURSE NOTE - OBJECTIVE STATEMENT
38 y/o female AOx3 with pmhx of hysterectomy, salpingectomy, hypothyroidism, and chronic migraines c/o nausea, vomiting, diarrhea, and left abdominal pain. Pt endorses this has been occurring since today, and has migraine as well. Lung sounds clear in all lobes, abdomen soft, non-distended but tender in Left upper and lower quadrant, bowel sounds present in all quadrants. Pt currently complaining of abdominal pain and migraine but denies chest pain, SOB, dysuria, n/v, and fevers/chills. VS stable but slightly tachycardic. IV placed, labs sent. Pt  currently lying in stretcher with side rails up, no signs of distress. Safety and comfort provided

## 2025-01-29 LAB
-  AMOXICILLIN/CLAVULANIC ACID: SIGNIFICANT CHANGE UP
-  AMPICILLIN/SULBACTAM: SIGNIFICANT CHANGE UP
-  AMPICILLIN: SIGNIFICANT CHANGE UP
-  AMPICILLIN: SIGNIFICANT CHANGE UP
-  AZTREONAM: SIGNIFICANT CHANGE UP
-  CEFAZOLIN: SIGNIFICANT CHANGE UP
-  CEFEPIME: SIGNIFICANT CHANGE UP
-  CEFOXITIN: SIGNIFICANT CHANGE UP
-  CEFTRIAXONE: SIGNIFICANT CHANGE UP
-  CIPROFLOXACIN: SIGNIFICANT CHANGE UP
-  CIPROFLOXACIN: SIGNIFICANT CHANGE UP
-  ERTAPENEM: SIGNIFICANT CHANGE UP
-  GENTAMICIN: SIGNIFICANT CHANGE UP
-  LEVOFLOXACIN: SIGNIFICANT CHANGE UP
-  LEVOFLOXACIN: SIGNIFICANT CHANGE UP
-  MEROPENEM: SIGNIFICANT CHANGE UP
-  NITROFURANTOIN: SIGNIFICANT CHANGE UP
-  NITROFURANTOIN: SIGNIFICANT CHANGE UP
-  PIPERACILLIN/TAZOBACTAM: SIGNIFICANT CHANGE UP
-  TETRACYCLINE: SIGNIFICANT CHANGE UP
-  TOBRAMYCIN: SIGNIFICANT CHANGE UP
-  TRIMETHOPRIM/SULFAMETHOXAZOLE: SIGNIFICANT CHANGE UP
-  VANCOMYCIN: SIGNIFICANT CHANGE UP
CULTURE RESULTS: ABNORMAL
METHOD TYPE: SIGNIFICANT CHANGE UP
METHOD TYPE: SIGNIFICANT CHANGE UP
ORGANISM # SPEC MICROSCOPIC CNT: ABNORMAL
SPECIMEN SOURCE: SIGNIFICANT CHANGE UP

## 2025-03-05 NOTE — OB RN PATIENT PROFILE - LIMIT VISITORS, INFANT PROFILE
Medication: Lasix passed protocol.   Last office visit date: 1/10/25  Next appointment scheduled?: No;  Not needed until 7/10/25     Number of refills given: Routed to MD to approve due to rx last being sent in by pcp  
no

## 2025-03-28 ENCOUNTER — TRANSCRIPTION ENCOUNTER (OUTPATIENT)
Age: 40
End: 2025-03-28

## 2025-04-22 ENCOUNTER — EMERGENCY (EMERGENCY)
Facility: HOSPITAL | Age: 40
LOS: 1 days | End: 2025-04-22
Attending: STUDENT IN AN ORGANIZED HEALTH CARE EDUCATION/TRAINING PROGRAM
Payer: COMMERCIAL

## 2025-04-22 VITALS
OXYGEN SATURATION: 95 % | DIASTOLIC BLOOD PRESSURE: 63 MMHG | TEMPERATURE: 98 F | SYSTOLIC BLOOD PRESSURE: 103 MMHG | RESPIRATION RATE: 16 BRPM

## 2025-04-22 VITALS
OXYGEN SATURATION: 97 % | SYSTOLIC BLOOD PRESSURE: 83 MMHG | DIASTOLIC BLOOD PRESSURE: 59 MMHG | HEIGHT: 64 IN | WEIGHT: 160.06 LBS | RESPIRATION RATE: 16 BRPM | HEART RATE: 123 BPM | TEMPERATURE: 99 F

## 2025-04-22 DIAGNOSIS — Z98.891 HISTORY OF UTERINE SCAR FROM PREVIOUS SURGERY: Chronic | ICD-10-CM

## 2025-04-22 DIAGNOSIS — Z90.89 ACQUIRED ABSENCE OF OTHER ORGANS: Chronic | ICD-10-CM

## 2025-04-22 DIAGNOSIS — Z98.890 OTHER SPECIFIED POSTPROCEDURAL STATES: Chronic | ICD-10-CM

## 2025-04-22 DIAGNOSIS — Z98.89 OTHER SPECIFIED POSTPROCEDURAL STATES: Chronic | ICD-10-CM

## 2025-04-22 DIAGNOSIS — Z52.811 EGG (OOCYTE) DONOR UNDER AGE 35, DESIGNATED RECIPIENT: Chronic | ICD-10-CM

## 2025-04-22 LAB
ALBUMIN SERPL ELPH-MCNC: 4.8 G/DL — SIGNIFICANT CHANGE UP (ref 3.3–5)
ALP SERPL-CCNC: 54 U/L — SIGNIFICANT CHANGE UP (ref 40–120)
ALT FLD-CCNC: 13 U/L — SIGNIFICANT CHANGE UP (ref 10–45)
ANION GAP SERPL CALC-SCNC: 14 MMOL/L — SIGNIFICANT CHANGE UP (ref 5–17)
APPEARANCE UR: CLEAR — SIGNIFICANT CHANGE UP
AST SERPL-CCNC: 19 U/L — SIGNIFICANT CHANGE UP (ref 10–40)
BACTERIA # UR AUTO: NEGATIVE /HPF — SIGNIFICANT CHANGE UP
BASOPHILS # BLD AUTO: 0.21 K/UL — HIGH (ref 0–0.2)
BASOPHILS NFR BLD AUTO: 0.9 % — SIGNIFICANT CHANGE UP (ref 0–2)
BILIRUB SERPL-MCNC: 0.3 MG/DL — SIGNIFICANT CHANGE UP (ref 0.2–1.2)
BILIRUB UR-MCNC: NEGATIVE — SIGNIFICANT CHANGE UP
BUN SERPL-MCNC: 13 MG/DL — SIGNIFICANT CHANGE UP (ref 7–23)
CALCIUM SERPL-MCNC: 9.7 MG/DL — SIGNIFICANT CHANGE UP (ref 8.4–10.5)
CAST: 0 /LPF — SIGNIFICANT CHANGE UP (ref 0–4)
CHLORIDE SERPL-SCNC: 102 MMOL/L — SIGNIFICANT CHANGE UP (ref 96–108)
CO2 SERPL-SCNC: 20 MMOL/L — LOW (ref 22–31)
COLOR SPEC: YELLOW — SIGNIFICANT CHANGE UP
CREAT SERPL-MCNC: 0.98 MG/DL — SIGNIFICANT CHANGE UP (ref 0.5–1.3)
DIFF PNL FLD: NEGATIVE — SIGNIFICANT CHANGE UP
EGFR: 76 ML/MIN/1.73M2 — SIGNIFICANT CHANGE UP
EGFR: 76 ML/MIN/1.73M2 — SIGNIFICANT CHANGE UP
EOSINOPHIL # BLD AUTO: 0 K/UL — SIGNIFICANT CHANGE UP (ref 0–0.5)
EOSINOPHIL NFR BLD AUTO: 0 % — SIGNIFICANT CHANGE UP (ref 0–6)
GLUCOSE SERPL-MCNC: 97 MG/DL — SIGNIFICANT CHANGE UP (ref 70–99)
GLUCOSE UR QL: NEGATIVE MG/DL — SIGNIFICANT CHANGE UP
HCT VFR BLD CALC: 41.8 % — SIGNIFICANT CHANGE UP (ref 34.5–45)
HGB BLD-MCNC: 14.9 G/DL — SIGNIFICANT CHANGE UP (ref 11.5–15.5)
KETONES UR-MCNC: NEGATIVE MG/DL — SIGNIFICANT CHANGE UP
LEUKOCYTE ESTERASE UR-ACNC: ABNORMAL
LIDOCAIN IGE QN: 36 U/L — SIGNIFICANT CHANGE UP (ref 7–60)
LYMPHOCYTES # BLD AUTO: 1.02 K/UL — SIGNIFICANT CHANGE UP (ref 1–3.3)
LYMPHOCYTES # BLD AUTO: 4.3 % — LOW (ref 13–44)
MANUAL SMEAR VERIFICATION: SIGNIFICANT CHANGE UP
MCHC RBC-ENTMCNC: 31 PG — SIGNIFICANT CHANGE UP (ref 27–34)
MCHC RBC-ENTMCNC: 35.6 G/DL — SIGNIFICANT CHANGE UP (ref 32–36)
MCV RBC AUTO: 87.1 FL — SIGNIFICANT CHANGE UP (ref 80–100)
MONOCYTES # BLD AUTO: 1.45 K/UL — HIGH (ref 0–0.9)
MONOCYTES NFR BLD AUTO: 6.1 % — SIGNIFICANT CHANGE UP (ref 2–14)
NEUTROPHILS # BLD AUTO: 20.83 K/UL — HIGH (ref 1.8–7.4)
NEUTROPHILS NFR BLD AUTO: 82.6 % — HIGH (ref 43–77)
NEUTS BAND # BLD: 5.2 % — SIGNIFICANT CHANGE UP (ref 0–8)
NEUTS BAND NFR BLD: 5.2 % — SIGNIFICANT CHANGE UP (ref 0–8)
NITRITE UR-MCNC: NEGATIVE — SIGNIFICANT CHANGE UP
PH UR: 5.5 — SIGNIFICANT CHANGE UP (ref 5–8)
PLAT MORPH BLD: NORMAL — SIGNIFICANT CHANGE UP
PLATELET # BLD AUTO: 269 K/UL — SIGNIFICANT CHANGE UP (ref 150–400)
POTASSIUM SERPL-MCNC: 4.5 MMOL/L — SIGNIFICANT CHANGE UP (ref 3.5–5.3)
POTASSIUM SERPL-SCNC: 4.5 MMOL/L — SIGNIFICANT CHANGE UP (ref 3.5–5.3)
PROT SERPL-MCNC: 7.7 G/DL — SIGNIFICANT CHANGE UP (ref 6–8.3)
PROT UR-MCNC: NEGATIVE MG/DL — SIGNIFICANT CHANGE UP
RBC # BLD: 4.8 M/UL — SIGNIFICANT CHANGE UP (ref 3.8–5.2)
RBC # FLD: 12.3 % — SIGNIFICANT CHANGE UP (ref 10.3–14.5)
RBC BLD AUTO: SIGNIFICANT CHANGE UP
RBC CASTS # UR COMP ASSIST: 1 /HPF — SIGNIFICANT CHANGE UP (ref 0–4)
REVIEW: SIGNIFICANT CHANGE UP
SODIUM SERPL-SCNC: 136 MMOL/L — SIGNIFICANT CHANGE UP (ref 135–145)
SP GR SPEC: 1.01 — SIGNIFICANT CHANGE UP (ref 1–1.03)
SQUAMOUS # UR AUTO: 2 /HPF — SIGNIFICANT CHANGE UP (ref 0–5)
UROBILINOGEN FLD QL: 0.2 MG/DL — SIGNIFICANT CHANGE UP (ref 0.2–1)
VARIANT LYMPHS # BLD: 0.9 % — SIGNIFICANT CHANGE UP (ref 0–6)
VARIANT LYMPHS NFR BLD MANUAL: 0.9 % — SIGNIFICANT CHANGE UP (ref 0–6)
WBC # BLD: 23.72 K/UL — HIGH (ref 3.8–10.5)
WBC # FLD AUTO: 23.72 K/UL — HIGH (ref 3.8–10.5)
WBC UR QL: 4 /HPF — SIGNIFICANT CHANGE UP (ref 0–5)

## 2025-04-22 PROCEDURE — 74177 CT ABD & PELVIS W/CONTRAST: CPT | Mod: 26

## 2025-04-22 PROCEDURE — 87086 URINE CULTURE/COLONY COUNT: CPT

## 2025-04-22 PROCEDURE — 99285 EMERGENCY DEPT VISIT HI MDM: CPT

## 2025-04-22 PROCEDURE — 74177 CT ABD & PELVIS W/CONTRAST: CPT | Mod: MC

## 2025-04-22 PROCEDURE — 93010 ELECTROCARDIOGRAM REPORT: CPT

## 2025-04-22 PROCEDURE — 81001 URINALYSIS AUTO W/SCOPE: CPT

## 2025-04-22 PROCEDURE — 96375 TX/PRO/DX INJ NEW DRUG ADDON: CPT

## 2025-04-22 PROCEDURE — 76705 ECHO EXAM OF ABDOMEN: CPT

## 2025-04-22 PROCEDURE — 96374 THER/PROPH/DIAG INJ IV PUSH: CPT | Mod: XU

## 2025-04-22 PROCEDURE — 82962 GLUCOSE BLOOD TEST: CPT

## 2025-04-22 PROCEDURE — 80053 COMPREHEN METABOLIC PANEL: CPT

## 2025-04-22 PROCEDURE — 85025 COMPLETE CBC W/AUTO DIFF WBC: CPT

## 2025-04-22 PROCEDURE — 83690 ASSAY OF LIPASE: CPT

## 2025-04-22 PROCEDURE — 93005 ELECTROCARDIOGRAM TRACING: CPT

## 2025-04-22 PROCEDURE — 76705 ECHO EXAM OF ABDOMEN: CPT | Mod: 26

## 2025-04-22 PROCEDURE — 99285 EMERGENCY DEPT VISIT HI MDM: CPT | Mod: 25

## 2025-04-22 RX ORDER — ACETAMINOPHEN 500 MG/5ML
1000 LIQUID (ML) ORAL ONCE
Refills: 0 | Status: COMPLETED | OUTPATIENT
Start: 2025-04-22 | End: 2025-04-22

## 2025-04-22 RX ORDER — METOCLOPRAMIDE HCL 10 MG
10 TABLET ORAL ONCE
Refills: 0 | Status: COMPLETED | OUTPATIENT
Start: 2025-04-22 | End: 2025-04-22

## 2025-04-22 RX ORDER — ACETAMINOPHEN 500 MG/5ML
1000 LIQUID (ML) ORAL ONCE
Refills: 0 | Status: DISCONTINUED | OUTPATIENT
Start: 2025-04-22 | End: 2025-04-22

## 2025-04-22 RX ORDER — ONDANSETRON HCL/PF 4 MG/2 ML
4 VIAL (ML) INJECTION ONCE
Refills: 0 | Status: COMPLETED | OUTPATIENT
Start: 2025-04-22 | End: 2025-04-22

## 2025-04-22 RX ORDER — MAGNESIUM, ALUMINUM HYDROXIDE 200-200 MG
30 TABLET,CHEWABLE ORAL ONCE
Refills: 0 | Status: COMPLETED | OUTPATIENT
Start: 2025-04-22 | End: 2025-04-22

## 2025-04-22 RX ADMIN — Medication 30 MILLILITER(S): at 12:09

## 2025-04-22 RX ADMIN — Medication 4 MILLIGRAM(S): at 12:09

## 2025-04-22 RX ADMIN — Medication 20 MILLIGRAM(S): at 12:05

## 2025-04-22 RX ADMIN — Medication 10 MILLIGRAM(S): at 15:46

## 2025-04-22 RX ADMIN — Medication 1000 MILLILITER(S): at 12:05

## 2025-04-22 RX ADMIN — Medication 400 MILLIGRAM(S): at 12:06

## 2025-04-22 NOTE — ED PROVIDER NOTE - PATIENT PORTAL LINK FT
Patient is at her son's house in [facility] and does not have her rx for amlodipine, doesn't know what she has done with it. She does have her losartan. Could you please send in a 30 day supply to [facility] in [facility]? Requested Prescriptions     Pending Prescriptions Disp Refills    losartan (COZAAR) 25 mg tablet 30 Tablet 0     Sig: Take 1 Tablet by mouth daily for 90 days. You can access the FollowMyHealth Patient Portal offered by [facility] by registering at the following website: [email or web address]. By joining [employer]’s FollowMyHealth portal, you will also be able to view your health information using other applications (apps) compatible with our system.

## 2025-04-22 NOTE — ED PROVIDER NOTE - NSCAREINITIATED _GEN_ER
Patient : Karthik Simeon Age: 39 year old Sex: male   MRN: [id number] Encounter Date: 10/10/2024      History     Chief Complaint   Patient presents with    Chest Pain     39-year-old male, with history of anxiety and chronic headaches, states he was diagnosed with COVID yesterday at urgent care and started Paxlovid.  Has been coughing all night now is having sharp left-sided chest pain.  Worse with cough.  He denies any fever or chills.  He is concerned about taking another dose of Paxil bid is unsure if this is related.  Feels mildly short of breath.  No fever or chills.        Allergies   Allergen Reactions    Amitriptyline SWELLING     Eyes swelled shut, blurry vision    Iodinated Diagnostic Agents Nausea & Vomiting and Other (See Comments)     Trouble breathing  and high bp    Pantoprazole RASH and HIVES    Contrast Media GI UPSET    Iodine   (Environmental Or Med) Nausea & Vomiting    Reglan ANXIETY    Venlafaxine Muscle Spasm       Current Discharge Medication List        Prior to Admission Medications    Details   nirmatrelvir & ritonavir 300mg/100mg (Paxlovid, 300/100,) 20 x 150 MG & 10 x 100MG Take 3 tablets by mouth twice daily for 5 days. Indications: Infection caused by COVID-19 Coronavirus  Qty: 30 each, Refills: 0      rimegepant sulfate (Nurtec) 75 MG disintegrating tablet Take 1 tablet by mouth every other day. Max 1 tablet/24 hours.  Qty: 16 tablet, Refills: 1      ondansetron (ZOFRAN ODT) 4 MG disintegrating tablet Place 1 tablet onto the tongue every 8 hours as needed for Nausea.  Qty: 10 tablet, Refills: 0      Tirzepatide-Weight Management 5 MG/0.5ML Solution Auto-injector Inject 5 mg into the skin every 7 days. Indications: OBESITY  Qty: 2 mL, Refills: 1      clindamycin 1 % gel Apply topically daily to back and behind ears  Qty: 60 g, Refills: 11      benzoyl peroxide (Benzac AC Wash) 5 % external liquid Apply to back and behind ears in shower daily. Lather. Let sit ~1 minutes. Rinse  Shraddha Whitten(Attending) thoroughly. May bleach fabrics.  Qty: 148 g, Refills: 11      FLUoxetine (PROzac) 20 MG capsule Take 1 capsule by mouth daily.  Qty: 30 capsule, Refills: 0      losartan (COZAAR) 25 MG tablet Take 1 tablet by mouth once daily.  Qty: 90 tablet, Refills: 1      polyethylene glycol (MIRALAX) 17 g packet Take 17 g by mouth daily as needed (Constipation). Stir and dissolve powder in any 4 to 8 ounces of beverage, then drink.             Past Medical History:   Diagnosis Date    Anxiety     Head ache     Sleep apnea     using CPAP    Vertigo        Past Surgical History:   Procedure Laterality Date    ADENOIDECTOMY      MULTIPLE TOOTH EXTRACTIONS  06/24/2021    TONSILLECTOMY         Family History   Problem Relation Age of Onset    Hypertension Mother     Stroke/TIA Mother     Cancer Maternal Aunt     Heart disease Maternal Grandmother     Psychiatric Maternal Grandmother     Hypertension Maternal Grandmother     Stroke/TIA Maternal Grandmother     Cancer Paternal Grandmother     Heart disease Maternal Grandfather     Hypertension Maternal Grandfather        Social History     Tobacco Use    Smoking status: Never     Passive exposure: Past (\"when younger step dad smoked\")    Smokeless tobacco: Never   Vaping Use    Vaping status: never used   Substance Use Topics    Alcohol use: Never    Drug use: Never       E-cigarette/Vaping    E-Cigarette/Vaping Use Never Used      E-Cigarette/Vaping Substances & Devices       Review of Systems   Constitutional:  Negative for chills, fatigue and fever.   Respiratory:  Positive for cough and shortness of breath.    Cardiovascular:  Positive for chest pain.   Gastrointestinal:  Negative for abdominal pain.   Genitourinary:  Negative for difficulty urinating.   Musculoskeletal:  Negative for back pain.   Neurological:  Negative for headaches.   Psychiatric/Behavioral:  Negative for confusion.        Physical Exam     ED Triage Vitals [10/10/24 0709]   ED Triage Vitals Group      Tem  98.1 °F (36.7 °C)      Heart Rate (!) 107      Resp (!) 26      /81      SpO2 100 %      EtCO2 mmHg       Height 5' 9\" (1.753 m)      Weight 179 lb 3.7 oz (81.3 kg)      Weight Scale Used Scale in bed      BMI (Calculated) 26.47      IBW/kg (Calculated) 70.7       Physical Exam  Vitals and nursing note reviewed.   Constitutional:       General: He is not in acute distress.  HENT:      Head: Normocephalic and atraumatic.   Eyes:      Conjunctiva/sclera: Conjunctivae normal.   Cardiovascular:      Rate and Rhythm: Normal rate and regular rhythm.      Heart sounds: No murmur heard.  Pulmonary:      Effort: Pulmonary effort is normal.      Breath sounds: Normal breath sounds. No wheezing.   Abdominal:      General: There is no distension.      Palpations: Abdomen is soft.      Tenderness: There is no abdominal tenderness.   Musculoskeletal:      Right lower leg: No edema.      Left lower leg: No edema.   Skin:     General: Skin is warm and dry.   Neurological:      Mental Status: He is alert and oriented to person, place, and time.   Psychiatric:         Mood and Affect: Mood normal.         ED Course     Procedures    Lab Results     Results for orders placed or performed during the hospital encounter of 10/10/24   TROPONIN I, HIGH SENSITIVITY    Specimen: Blood, Venous   Result Value Ref Range    Troponin I, High Sensitivity <4 <77 ng/L       EKG Results     EKG Interpretation  Normal sinus rhythm rate 100 no acute ST changes, paired to September 2, 2024 no acute change.    EKG tracing interpreted by ED physician    Radiology Results     Imaging Results              XR CHEST AP OR PA (Final result)  Result time 10/10/24 07:39:25      Final result                   Impression:    IMPRESSION:  1.   No radiographic evidence of acute cardiopulmonary disease.      Electronically Signed by: Ramses Mcwilliams MD  Signed on: 10/10/2024 7:37 AM  Created on Workstation ID: [id number]  Signed on Workstation ID: [id number]                Narrative:    EXAM: XR CHEST AP OR PA     HISTORY: chest pain, postive COVID test    COMPARISON: 6/4/2024.    FINDINGS:  *   Views: This is an AP portable upright view of the chest.  *   The lung volumes are normally aerated.    *   There are no focal infiltrates.   *   The cardiac size is within normal limits. There is no pulmonary  vascular congestion.   *   Mediastinal contours are within normal limits.   *   Pleural spaces are clear.   *   No acute osseous abnormalities are seen.                                      ED Medication Orders (From admission, onward)      Ordered Start     Status Ordering Provider    10/10/24 0715 10/10/24 0716  ketorolac (TORADOL) injection 15 mg  ONCE         Last MAR action: Given YAHAIRA MANTILLA                 Medical Decision Making  31-year-old male with likely chest wall pain related to coughing COVID infection.  Overall suspicion for ACS is extraordinarily low but will check an EKG and troponin.  Also check a chest x-ray given his known COVID diagnosis to be sure there is no focal infiltrate.  Treat with IV Toradol.    Reviewed notes from outside urgent care yesterday and care everywhere    EKG to my interpretation is normal.    Chest x-ray by my interpretation is normal.    Labs reviewed his troponin is normal.    Patient is reassured.  Advised is safe to continue taking Paxlovid.  Continue NSAIDs as needed for chest wall pain.            Clinical Impression     ED Diagnosis   1. Acute chest wall pain        2. COVID-19 virus infection            Disposition        Discharge 10/10/2024  7:41 AM  Karthik Simeon discharge to home/self care.             Yahaira Mantilla MD  10/10/24 0742

## 2025-04-22 NOTE — ED PROVIDER NOTE - NSFOLLOWUPCLINICS_GEN_ALL_ED_FT
Gastroenterology at [facility]  Gastroenterology  [address] [place] [address]  Phone: [phone]  Fax:     [facility]  Gastroenterology  [address] [place] [id number]  Phone: [phone]  Fax:

## 2025-04-22 NOTE — ED PROVIDER NOTE - NSPTACCESSSVCSAPPTDETAILS_ED_ALL_ED_FT
1. GI for nausea.vomtiing ongoing for 1 year. wants new GI doc since going no where with old one.   see within 1- 2 weeks please

## 2025-04-22 NOTE — ED PROVIDER NOTE - CLINICAL SUMMARY MEDICAL DECISION MAKING FREE TEXT BOX
Attending Shraddha Whitten MD: 38 yo F with PMHx of hypothyroidism and diverticulosis presents with abdominal pain, nausea and vomiting. She reports that she has been diagnosed with colitis in the past with recurrent symptoms over the past month requiring multiple ER visits. However, has seen a GI doctor who told her she doesn't think its colitis and that her symptoms are a side effect of her Ozempic. She denies fever, diarrhea dysuria or hematuria.     PE: well appearing, nontoxic, no respiratory distress.  Lungs clear to auscultation, regular rate and rhythm. Abdomen soft, tenderness to palpation in the RUQ, epigastric region and LLQ. Neuro nonfocal.  Skin intact. Psych normal mood.    MDM: Differential diagnosis includes but is not limited to gastritis, cholelithiasis, cholecystitis, small bowel obstruction, diverticulitis, colitis, pancreatitis   Discussed with patient and  to refrain from CT at this time due to recurrent radiation exposure and known diagnoses  Will treat symptom management at this time and reassess following labs and med administration

## 2025-04-22 NOTE — ED ADULT NURSE NOTE - OBJECTIVE STATEMENT
40 y/o F with PMH of hypothyroidism, presents to the ED with complaints of abd pain x this morning, progressively worsening.  at bedside, states that pt had been endorsing intermittent abd pain for the past 2 months, and diagnosis with colitis. Pt endorese chills, N/V. and denies F/ D, chest pain, SOB, urinary symptoms. Pt A&Ox3 gross neuro intact, no difficulty speaking in complete sentences, pulses x 4, hodgson x4,, skin intact, iv 20g placed in LAC.

## 2025-04-22 NOTE — ED PROVIDER NOTE - PROGRESS NOTE DETAILS
(Lucy Rivers MD-PGY1): In addition to attg note: Patient reports she has had nausea and vomiting all day today.  About 5 episodes of emesis that is nonbilious nonbloody.  2 weeks ago she had a similar thing and went to [place] for this.  Had CT showing possible colitis.  2 months ago she had a similar thing.  She has had some form of nausea and vomiting since June of last year.  There is some concern that this is related to Ozempic, but patient does not feel this is likely as she had been off of the Ozempic for 4 months and had symptoms 2.  She is looking for a new GI specialist as she is not quite sure the current one is really investigating her symptoms.  Zofran does work for her nausea at times but does not have a prescription at home.  Does not believe that anything is significantly different than her symptoms 2 weeks ago, but is having vomiting. (Lucy Rivers MD-PGY1): Workup negative for cause of her symptoms. She felt better after zofran and reglan. Will send with zofran x1 pack and new GI referral. Advised to journal symptoms for pattern and possible consider behavioral component if GI workup is unrevealing and she continues to have symptoms.

## 2025-04-22 NOTE — ED PROVIDER NOTE - NSFOLLOWUPINSTRUCTIONS_ED_ALL_ED_FT
SUMMARY  You were seen in the ER on 4/22/25 for evaluation of abdominal pain and nausea. We did CT and US that did not show a cause of your symptoms. The lab work is showing an elevated white cell count which could be from inflammation or vomiting, but we could not find a source of infection today. You are able to go home and follow up with new GI referral we placed. We recommend you try journaling symptoms to see if there is a pattern with mood or sleep or what you eat or when you eat or anything else.  Please follow the recommendations below. Thank you for allowing us to care for you!    RECOMMENDATIONS  For Pain  - You can take Acetaminophen (Tylenol) 650mg-1000mg every 6 hours as needed (max 4000mg/day)  - You may want to try Mylanta or Pepcid if this is acid-reflux related or gas related  - Journal your symptoms as discussed  - Follow up with GI referral and PCP for further evaluation and symptom control    SCHEDULE APPOINTMENT WITH  1. Your regular doctor if you are wanting follow up or check up or for questions  2. GI: We placed a referral. They may call you or you can call # provided to schedule appt.     RETURN TO THE ER...  For any worsening symptoms or concerns: chest pain, shortness of breath, palpitations, lightheadedness, dizziness, fainting, new weakness/numbness or new difficulty speaking or swallowing, severe pain, severe bleeding or bleeding that doesn't stop, inability to drink liquids (constant vomiting), new inability to walk or frequent falls, or anything else concerning.

## 2025-04-23 LAB
CULTURE RESULTS: NO GROWTH — SIGNIFICANT CHANGE UP
SPECIMEN SOURCE: SIGNIFICANT CHANGE UP

## 2025-04-29 ENCOUNTER — APPOINTMENT (OUTPATIENT)
Dept: OBGYN | Facility: CLINIC | Age: 40
End: 2025-04-29

## 2025-07-01 ENCOUNTER — APPOINTMENT (OUTPATIENT)
Dept: OBGYN | Facility: CLINIC | Age: 40
End: 2025-07-01

## 2025-08-05 ENCOUNTER — APPOINTMENT (OUTPATIENT)
Dept: OBGYN | Facility: CLINIC | Age: 40
End: 2025-08-05
Payer: COMMERCIAL

## 2025-08-05 VITALS
SYSTOLIC BLOOD PRESSURE: 118 MMHG | BODY MASS INDEX: 28 KG/M2 | DIASTOLIC BLOOD PRESSURE: 68 MMHG | WEIGHT: 164 LBS | HEIGHT: 64 IN

## 2025-08-05 DIAGNOSIS — R23.2 FLUSHING: ICD-10-CM

## 2025-08-05 DIAGNOSIS — Z01.419 ENCOUNTER FOR GYNECOLOGICAL EXAMINATION (GENERAL) (ROUTINE) W/OUT ABNORMAL FINDINGS: ICD-10-CM

## 2025-08-05 PROCEDURE — 36415 COLL VENOUS BLD VENIPUNCTURE: CPT

## 2025-08-05 PROCEDURE — 99395 PREV VISIT EST AGE 18-39: CPT

## 2025-08-05 PROCEDURE — 99213 OFFICE O/P EST LOW 20 MIN: CPT | Mod: 25

## 2025-08-06 DIAGNOSIS — N95.1 MENOPAUSAL AND FEMALE CLIMACTERIC STATES: ICD-10-CM

## 2025-08-06 DIAGNOSIS — R61 GENERALIZED HYPERHIDROSIS: ICD-10-CM

## 2025-08-06 LAB
ESTRADIOL SERPL-MCNC: 164 PG/ML
FSH SERPL-MCNC: 4.9 IU/L
LH SERPL-ACNC: 13.4 IU/L

## 2025-08-07 ENCOUNTER — RX RENEWAL (OUTPATIENT)
Age: 40
End: 2025-08-07

## 2025-08-07 LAB
CYTOLOGY CVX/VAG DOC THIN PREP: NORMAL
HPV HIGH+LOW RISK DNA PNL CVX: NOT DETECTED

## 2025-08-07 RX ORDER — ESTRADIOL 0.03 MG/D
0.03 PATCH TRANSDERMAL
Qty: 12 | Refills: 0 | Status: ACTIVE | COMMUNITY
Start: 2025-08-06 | End: 1900-01-01

## 2025-09-21 ENCOUNTER — NON-APPOINTMENT (OUTPATIENT)
Age: 40
End: 2025-09-21

## (undated) DEVICE — Device

## (undated) DEVICE — DRSG STERISTRIPS 0.5 X 4"

## (undated) DEVICE — PREP BETADINE SPONGE STICKS

## (undated) DEVICE — DRAPE LIGHT HANDLE COVER (BLUE)

## (undated) DEVICE — GOWN TRIMAX LG

## (undated) DEVICE — FOLEY TRAY 16FR LF URINE METER SURESTEP

## (undated) DEVICE — VISITEC 4X4

## (undated) DEVICE — BLADE SCALPEL SAFETYLOCK #15

## (undated) DEVICE — GLV 7 PROTEXIS (WHITE)

## (undated) DEVICE — POSITIONER PURPLE ARM ONE STEP (LARGE)

## (undated) DEVICE — DRAPE MAYO STAND 30"

## (undated) DEVICE — SUT POLYSORB 0 18" TIES UNDYED

## (undated) DEVICE — DRAPE TOWEL BLUE 17" X 24"

## (undated) DEVICE — SUT POLYSORB 1 18" UNDYED

## (undated) DEVICE — ABDOMINAL BINDER XXL 12" X 72"-84"

## (undated) DEVICE — SUT QUILL MONODERM 3-0 30CM PS-2

## (undated) DEVICE — SUT POLYSORB 2-0 30" GS-21 UNDYED

## (undated) DEVICE — POSITIONER FOAM EGG CRATE ULNAR 2PCS (PINK)

## (undated) DEVICE — STAPLER SKIN VISI-STAT 35 WIDE

## (undated) DEVICE — DRAPE INSTRUMENT POUCH 6.75" X 11"

## (undated) DEVICE — VENODYNE/SCD SLEEVE CALF LARGE

## (undated) DEVICE — MARKING PEN W RULER

## (undated) DEVICE — LIGASURE IMPACT

## (undated) DEVICE — PACK MAJOR ABDOMINAL SUPINE

## (undated) DEVICE — SUT PLAIN GUT 3-0 30" GS-21

## (undated) DEVICE — SUT POLYSORB 0 36" GS-21

## (undated) DEVICE — LAP PAD 18 X 18"

## (undated) DEVICE — TUBING BRAT 2 SUCTION ASSEMBLY TWIST LOCK

## (undated) DEVICE — BLADE SCALPEL SAFETYLOCK #10

## (undated) DEVICE — GLV 8 PROTEXIS (WHITE)

## (undated) DEVICE — DRSG OPSITE 13.75 X 4"

## (undated) DEVICE — POSITIONER PINK PAD PIGAZZI SYSTEM

## (undated) DEVICE — SUT CAPROSYN 1 36" GS-25 VIOLET

## (undated) DEVICE — ABDOMINAL BINDER MED/LG 12" X 36"-54"

## (undated) DEVICE — GLV 8.5 PROTEXIS (WHITE)

## (undated) DEVICE — WARMING BLANKET LOWER ADULT

## (undated) DEVICE — GLV 7.5 PROTEXIS (WHITE)

## (undated) DEVICE — SUT POLYSORB 1 36" GS-21 UNDYED

## (undated) DEVICE — SOL IRR POUR NS 0.9% 500ML

## (undated) DEVICE — SPECIMEN CONTAINER 100ML

## (undated) DEVICE — SUT POLYSORB 4-0 30" SC-2 UNDYED

## (undated) DEVICE — SUT POLYSORB 0 30" GS-21 UNDYED

## (undated) DEVICE — WARMING BLANKET UPPER ADULT

## (undated) DEVICE — SOL IRR POUR H2O 250ML

## (undated) DEVICE — PREP CHLORAPREP HI-LITE ORANGE 26ML

## (undated) DEVICE — GLV 6.5 PROTEXIS (WHITE)

## (undated) DEVICE — ABDOMINAL BINDER XL 9" X 62"-74"

## (undated) DEVICE — MEDICATION LABELS W MARKER

## (undated) DEVICE — SUT CHROMIC 0 27" CT-1

## (undated) DEVICE — PREP BETADINE KIT